# Patient Record
Sex: FEMALE | Race: BLACK OR AFRICAN AMERICAN | Employment: UNEMPLOYED | URBAN - METROPOLITAN AREA
[De-identification: names, ages, dates, MRNs, and addresses within clinical notes are randomized per-mention and may not be internally consistent; named-entity substitution may affect disease eponyms.]

---

## 2020-07-22 ENCOUNTER — APPOINTMENT (OUTPATIENT)
Dept: MRI IMAGING | Age: 60
DRG: 263 | End: 2020-07-22
Attending: FAMILY MEDICINE
Payer: MEDICAID

## 2020-07-22 ENCOUNTER — HOSPITAL ENCOUNTER (INPATIENT)
Age: 60
LOS: 6 days | Discharge: HOME | DRG: 263 | End: 2020-07-28
Attending: FAMILY MEDICINE | Admitting: FAMILY MEDICINE
Payer: MEDICAID

## 2020-07-22 DIAGNOSIS — Z90.49 HX LAPAROSCOPIC CHOLECYSTECTOMY: Primary | ICD-10-CM

## 2020-07-22 DIAGNOSIS — R07.9 CHEST PAIN: ICD-10-CM

## 2020-07-22 PROBLEM — K85.10 GALL STONE PANCREATITIS: Status: ACTIVE | Noted: 2020-07-22

## 2020-07-22 LAB
ERYTHROCYTE [DISTWIDTH] IN BLOOD BY AUTOMATED COUNT: 13.7 % (ref 11.5–14.5)
HCT VFR BLD AUTO: 40.9 % (ref 35–47)
HGB BLD-MCNC: 13.7 G/DL (ref 11.5–16)
MCH RBC QN AUTO: 32.6 PG (ref 26–34)
MCHC RBC AUTO-ENTMCNC: 33.5 G/DL (ref 30–36.5)
MCV RBC AUTO: 97.4 FL (ref 80–99)
NRBC # BLD: 0 K/UL (ref 0–0.01)
NRBC BLD-RTO: 0 PER 100 WBC
PLATELET # BLD AUTO: 223 K/UL (ref 150–400)
PMV BLD AUTO: 10.4 FL (ref 8.9–12.9)
RBC # BLD AUTO: 4.2 M/UL (ref 3.8–5.2)
WBC # BLD AUTO: 5.4 K/UL (ref 3.6–11)

## 2020-07-22 PROCEDURE — 65660000000 HC RM CCU STEPDOWN

## 2020-07-22 PROCEDURE — 81001 URINALYSIS AUTO W/SCOPE: CPT

## 2020-07-22 PROCEDURE — 80053 COMPREHEN METABOLIC PANEL: CPT

## 2020-07-22 PROCEDURE — 84484 ASSAY OF TROPONIN QUANT: CPT

## 2020-07-22 PROCEDURE — 74011250636 HC RX REV CODE- 250/636: Performed by: FAMILY MEDICINE

## 2020-07-22 PROCEDURE — 85027 COMPLETE CBC AUTOMATED: CPT

## 2020-07-22 PROCEDURE — 74183 MRI ABD W/O CNTR FLWD CNTR: CPT

## 2020-07-22 PROCEDURE — 84100 ASSAY OF PHOSPHORUS: CPT

## 2020-07-22 PROCEDURE — 83735 ASSAY OF MAGNESIUM: CPT

## 2020-07-22 PROCEDURE — 36415 COLL VENOUS BLD VENIPUNCTURE: CPT

## 2020-07-22 RX ORDER — SODIUM CHLORIDE, SODIUM LACTATE, POTASSIUM CHLORIDE, CALCIUM CHLORIDE 600; 310; 30; 20 MG/100ML; MG/100ML; MG/100ML; MG/100ML
75 INJECTION, SOLUTION INTRAVENOUS CONTINUOUS
Status: DISCONTINUED | OUTPATIENT
Start: 2020-07-22 | End: 2020-07-28

## 2020-07-22 RX ORDER — SODIUM CHLORIDE 0.9 % (FLUSH) 0.9 %
5-40 SYRINGE (ML) INJECTION EVERY 8 HOURS
Status: DISCONTINUED | OUTPATIENT
Start: 2020-07-22 | End: 2020-07-28 | Stop reason: HOSPADM

## 2020-07-22 RX ORDER — HYDRALAZINE HYDROCHLORIDE 20 MG/ML
10 INJECTION INTRAMUSCULAR; INTRAVENOUS
Status: DISCONTINUED | OUTPATIENT
Start: 2020-07-22 | End: 2020-07-25

## 2020-07-22 RX ORDER — SODIUM CHLORIDE 0.9 % (FLUSH) 0.9 %
5-40 SYRINGE (ML) INJECTION AS NEEDED
Status: DISCONTINUED | OUTPATIENT
Start: 2020-07-22 | End: 2020-07-28 | Stop reason: HOSPADM

## 2020-07-22 RX ORDER — HYDROMORPHONE HYDROCHLORIDE 1 MG/ML
1 INJECTION, SOLUTION INTRAMUSCULAR; INTRAVENOUS; SUBCUTANEOUS
Status: DISCONTINUED | OUTPATIENT
Start: 2020-07-22 | End: 2020-07-25

## 2020-07-22 RX ORDER — HEPARIN SODIUM 5000 [USP'U]/ML
5000 INJECTION, SOLUTION INTRAVENOUS; SUBCUTANEOUS EVERY 8 HOURS
Status: DISCONTINUED | OUTPATIENT
Start: 2020-07-22 | End: 2020-07-23

## 2020-07-22 RX ORDER — ONDANSETRON 2 MG/ML
4 INJECTION INTRAMUSCULAR; INTRAVENOUS
Status: DISCONTINUED | OUTPATIENT
Start: 2020-07-22 | End: 2020-07-27

## 2020-07-22 RX ADMIN — HEPARIN SODIUM 5000 UNITS: 5000 INJECTION, SOLUTION INTRAVENOUS; SUBCUTANEOUS at 23:02

## 2020-07-22 NOTE — Clinical Note
Right groin and right radial clipped prepped with ChloraPrep and draped. Wet prep solution applied at: 1236. Wet prep solution dried at: 1240. Wet prep elapsed drying time: 4 mins.

## 2020-07-22 NOTE — PROGRESS NOTES
Patient has arrived to the floor at this time. Vitals taken. Admitting has been called for paperwork as well as a page to the admitting doc for orders. Bedside shift change report given to [address] [place] [address] Monie (oncoming nurse) by Paolo Franklin RN (offgoing nurse). Report included the following information SBAR, Kardex, Intake/Output, MAR and Recent Results.

## 2020-07-22 NOTE — ROUTINE PROCESS
TRANSFER - IN REPORT:    Verbal report received from Shankar Lloyd RN (name) on Ольга Nagy  being received from [facility] ([facility]) for routine progression of care      Report consisted of patients Situation, Background, Assessment and   Recommendations(SBAR). Information from the following report(s) SBAR, Kardex, Intake/Output, MAR and Recent Results was reviewed with the receiving nurse. Opportunity for questions and clarification was provided. Assessment completed upon patients arrival to unit and care assumed. Patient to be put in for transport from [facility] in [place], [place]. Approximate arrival is [address].

## 2020-07-23 ENCOUNTER — APPOINTMENT (OUTPATIENT)
Dept: NON INVASIVE DIAGNOSTICS | Age: 60
DRG: 263 | End: 2020-07-23
Attending: NURSE PRACTITIONER
Payer: MEDICAID

## 2020-07-23 PROBLEM — R07.9 CHEST PAIN: Status: ACTIVE | Noted: 2020-07-22

## 2020-07-23 LAB
ALBUMIN SERPL-MCNC: 3.6 G/DL (ref 3.5–5)
ALBUMIN SERPL-MCNC: 3.8 G/DL (ref 3.5–5)
ALBUMIN/GLOB SERPL: 0.8 {RATIO} (ref 1.1–2.2)
ALBUMIN/GLOB SERPL: 0.8 {RATIO} (ref 1.1–2.2)
ALP SERPL-CCNC: 640 U/L (ref 45–117)
ALP SERPL-CCNC: 664 U/L (ref 45–117)
ALT SERPL-CCNC: 628 U/L (ref 12–78)
ALT SERPL-CCNC: 655 U/L (ref 12–78)
ANION GAP SERPL CALC-SCNC: 10 MMOL/L (ref 5–15)
ANION GAP SERPL CALC-SCNC: 7 MMOL/L (ref 5–15)
APPEARANCE UR: ABNORMAL
APTT PPP: 25.2 SEC (ref 22.1–32)
AST SERPL-CCNC: 386 U/L (ref 15–37)
AST SERPL-CCNC: 414 U/L (ref 15–37)
ATRIAL RATE: 55 BPM
BACTERIA URNS QL MICRO: ABNORMAL /HPF
BASOPHILS # BLD: 0 K/UL (ref 0–0.1)
BASOPHILS NFR BLD: 0 % (ref 0–1)
BILIRUB SERPL-MCNC: 5.7 MG/DL (ref 0.2–1)
BILIRUB SERPL-MCNC: 5.9 MG/DL (ref 0.2–1)
BILIRUB UR QL CFM: POSITIVE
BUN SERPL-MCNC: 11 MG/DL (ref 6–20)
BUN SERPL-MCNC: 12 MG/DL (ref 6–20)
BUN/CREAT SERPL: 13 (ref 12–20)
BUN/CREAT SERPL: 14 (ref 12–20)
CALCIUM SERPL-MCNC: 9.2 MG/DL (ref 8.5–10.1)
CALCIUM SERPL-MCNC: 9.6 MG/DL (ref 8.5–10.1)
CALCULATED P AXIS, ECG09: 68 DEGREES
CALCULATED R AXIS, ECG10: 44 DEGREES
CALCULATED T AXIS, ECG11: 54 DEGREES
CHLORIDE SERPL-SCNC: 104 MMOL/L (ref 97–108)
CHLORIDE SERPL-SCNC: 104 MMOL/L (ref 97–108)
CO2 SERPL-SCNC: 23 MMOL/L (ref 21–32)
CO2 SERPL-SCNC: 25 MMOL/L (ref 21–32)
COLOR UR: ABNORMAL
CREAT SERPL-MCNC: 0.83 MG/DL (ref 0.55–1.02)
CREAT SERPL-MCNC: 0.84 MG/DL (ref 0.55–1.02)
DIAGNOSIS, 93000: NORMAL
DIFFERENTIAL METHOD BLD: ABNORMAL
ECHO AO ROOT DIAM: 2.46 CM
ECHO AV AREA PEAK VELOCITY: 1.9 CM2
ECHO AV AREA/BSA PEAK VELOCITY: 1 CM2/M2
ECHO AV PEAK GRADIENT: 7.99 MMHG
ECHO AV PEAK VELOCITY: 141.33 CM/S
ECHO LA AREA 4C: 19.04 CM2
ECHO LA MAJOR AXIS: 3.94 CM
ECHO LA MINOR AXIS: 2.16 CM
ECHO LA VOL 2C: 52.73 ML (ref 22–52)
ECHO LA VOL 4C: 49.66 ML (ref 22–52)
ECHO LA VOL BP: 55.58 ML (ref 22–52)
ECHO LA VOL/BSA BIPLANE: 30.47 ML/M2 (ref 16–28)
ECHO LA VOLUME INDEX A2C: 28.9 ML/M2 (ref 16–28)
ECHO LA VOLUME INDEX A4C: 27.22 ML/M2 (ref 16–28)
ECHO LV EDV A2C: 78.64 ML
ECHO LV EDV A4C: 80.33 ML
ECHO LV EDV BP: 79.07 ML (ref 56–104)
ECHO LV EDV INDEX A4C: 44 ML/M2
ECHO LV EDV INDEX BP: 43.3 ML/M2
ECHO LV EDV NDEX A2C: 43.1 ML/M2
ECHO LV EJECTION FRACTION A2C: 49 PERCENT
ECHO LV EJECTION FRACTION A4C: 53 PERCENT
ECHO LV EJECTION FRACTION BIPLANE: 49.8 PERCENT (ref 55–100)
ECHO LV ESV A2C: 39.97 ML
ECHO LV ESV A4C: 37.6 ML
ECHO LV ESV BP: 39.66 ML (ref 19–49)
ECHO LV ESV INDEX A2C: 21.9 ML/M2
ECHO LV ESV INDEX A4C: 20.6 ML/M2
ECHO LV ESV INDEX BP: 21.7 ML/M2
ECHO LV INTERNAL DIMENSION DIASTOLIC: 4.69 CM (ref 3.9–5.3)
ECHO LV INTERNAL DIMENSION SYSTOLIC: 3.81 CM
ECHO LV IVSD: 0.49 CM (ref 0.6–0.9)
ECHO LV MASS 2D: 98.1 G (ref 67–162)
ECHO LV MASS INDEX 2D: 53.8 G/M2 (ref 43–95)
ECHO LV POSTERIOR WALL DIASTOLIC: 0.86 CM (ref 0.6–0.9)
ECHO LVOT DIAM: 1.87 CM
ECHO LVOT PEAK GRADIENT: 3.82 MMHG
ECHO LVOT PEAK VELOCITY: 97.69 CM/S
ECHO MV A VELOCITY: 54.41 CM/S
ECHO MV AREA PHT: 3.88 CM2
ECHO MV E DECELERATION TIME (DT): 0.2 S
ECHO MV E VELOCITY: 101.43 CM/S
ECHO MV E/A RATIO: 1.86
ECHO MV EROA PISA: 0.14 CM2
ECHO MV PRESSURE HALF TIME (PHT): 0.06 S
ECHO MV REGURGITANT RADIUS PISA: 0.63 CM
ECHO MV REGURGITANT VOLUME: 30.35 ML
ECHO MV REGURGITANT VTIA: 215.7 CM
ECHO PV PEAK INSTANTANEOUS GRADIENT SYSTOLIC: 4.07 MMHG
ECHO PV REGURGITANT MAX VELOCITY: 581.08 CM/S
ECHO RV INTERNAL DIMENSION: 3.3 CM
ECHO RV TAPSE: 2.13 CM (ref 1.5–2)
EOSINOPHIL # BLD: 0 K/UL (ref 0–0.4)
EOSINOPHIL NFR BLD: 0 % (ref 0–7)
EPITH CASTS URNS QL MICRO: ABNORMAL /LPF
ERYTHROCYTE [DISTWIDTH] IN BLOOD BY AUTOMATED COUNT: 13.8 % (ref 11.5–14.5)
GLOBULIN SER CALC-MCNC: 4.5 G/DL (ref 2–4)
GLOBULIN SER CALC-MCNC: 4.8 G/DL (ref 2–4)
GLUCOSE SERPL-MCNC: 129 MG/DL (ref 65–100)
GLUCOSE SERPL-MCNC: 136 MG/DL (ref 65–100)
GLUCOSE UR STRIP.AUTO-MCNC: 250 MG/DL
HCT VFR BLD AUTO: 40.8 % (ref 35–47)
HGB BLD-MCNC: 13.6 G/DL (ref 11.5–16)
HGB UR QL STRIP: ABNORMAL
IMM GRANULOCYTES # BLD AUTO: 0 K/UL (ref 0–0.04)
IMM GRANULOCYTES NFR BLD AUTO: 0 % (ref 0–0.5)
KETONES UR QL STRIP.AUTO: ABNORMAL MG/DL
LEUKOCYTE ESTERASE UR QL STRIP.AUTO: ABNORMAL
LYMPHOCYTES # BLD: 1.1 K/UL (ref 0.8–3.5)
LYMPHOCYTES NFR BLD: 17 % (ref 12–49)
MAGNESIUM SERPL-MCNC: 2.3 MG/DL (ref 1.6–2.4)
MCH RBC QN AUTO: 32.8 PG (ref 26–34)
MCHC RBC AUTO-ENTMCNC: 33.3 G/DL (ref 30–36.5)
MCV RBC AUTO: 98.3 FL (ref 80–99)
MONOCYTES # BLD: 0.4 K/UL (ref 0–1)
MONOCYTES NFR BLD: 6 % (ref 5–13)
MUCOUS THREADS URNS QL MICRO: ABNORMAL /LPF
NEUTS SEG # BLD: 4.8 K/UL (ref 1.8–8)
NEUTS SEG NFR BLD: 77 % (ref 32–75)
NITRITE UR QL STRIP.AUTO: POSITIVE
NRBC # BLD: 0 K/UL (ref 0–0.01)
NRBC BLD-RTO: 0 PER 100 WBC
P-R INTERVAL, ECG05: 122 MS
PH UR STRIP: 5 [PH] (ref 5–8)
PHOSPHATE SERPL-MCNC: 3.9 MG/DL (ref 2.6–4.7)
PLATELET # BLD AUTO: 234 K/UL (ref 150–400)
PMV BLD AUTO: 10.5 FL (ref 8.9–12.9)
POTASSIUM SERPL-SCNC: 3.4 MMOL/L (ref 3.5–5.1)
POTASSIUM SERPL-SCNC: 3.6 MMOL/L (ref 3.5–5.1)
PROT SERPL-MCNC: 8.1 G/DL (ref 6.4–8.2)
PROT SERPL-MCNC: 8.6 G/DL (ref 6.4–8.2)
PROT UR STRIP-MCNC: 30 MG/DL
Q-T INTERVAL, ECG07: 498 MS
QRS DURATION, ECG06: 90 MS
QTC CALCULATION (BEZET), ECG08: 476 MS
RBC # BLD AUTO: 4.15 M/UL (ref 3.8–5.2)
RBC #/AREA URNS HPF: ABNORMAL /HPF (ref 0–5)
SODIUM SERPL-SCNC: 136 MMOL/L (ref 136–145)
SODIUM SERPL-SCNC: 137 MMOL/L (ref 136–145)
SP GR UR REFRACTOMETRY: 1.03
THERAPEUTIC RANGE,PTTT: NORMAL SECS (ref 58–77)
TROPONIN I SERPL-MCNC: 2.54 NG/ML
TROPONIN I SERPL-MCNC: 2.86 NG/ML
UR CULT HOLD, URHOLD: NORMAL
UROBILINOGEN UR QL STRIP.AUTO: 1 EU/DL (ref 0.2–1)
VENTRICULAR RATE, ECG03: 55 BPM
WBC # BLD AUTO: 6.3 K/UL (ref 3.6–11)
WBC URNS QL MICRO: ABNORMAL /HPF (ref 0–4)

## 2020-07-23 PROCEDURE — 65660000000 HC RM CCU STEPDOWN

## 2020-07-23 PROCEDURE — 36415 COLL VENOUS BLD VENIPUNCTURE: CPT

## 2020-07-23 PROCEDURE — 85730 THROMBOPLASTIN TIME PARTIAL: CPT

## 2020-07-23 PROCEDURE — A9585 GADOBUTROL INJECTION: HCPCS | Performed by: FAMILY MEDICINE

## 2020-07-23 PROCEDURE — 77030015766: Performed by: INTERNAL MEDICINE

## 2020-07-23 PROCEDURE — 74011636320 HC RX REV CODE- 636/320: Performed by: INTERNAL MEDICINE

## 2020-07-23 PROCEDURE — 74011250636 HC RX REV CODE- 250/636: Performed by: FAMILY MEDICINE

## 2020-07-23 PROCEDURE — 84484 ASSAY OF TROPONIN QUANT: CPT

## 2020-07-23 PROCEDURE — 80053 COMPREHEN METABOLIC PANEL: CPT

## 2020-07-23 PROCEDURE — C1769 GUIDE WIRE: HCPCS | Performed by: INTERNAL MEDICINE

## 2020-07-23 PROCEDURE — B2111ZZ FLUOROSCOPY OF MULTIPLE CORONARY ARTERIES USING LOW OSMOLAR CONTRAST: ICD-10-PCS | Performed by: INTERNAL MEDICINE

## 2020-07-23 PROCEDURE — 77030010221 HC SPLNT WR POS TELE -B: Performed by: INTERNAL MEDICINE

## 2020-07-23 PROCEDURE — 99152 MOD SED SAME PHYS/QHP 5/>YRS: CPT | Performed by: INTERNAL MEDICINE

## 2020-07-23 PROCEDURE — C1894 INTRO/SHEATH, NON-LASER: HCPCS | Performed by: INTERNAL MEDICINE

## 2020-07-23 PROCEDURE — 93306 TTE W/DOPPLER COMPLETE: CPT

## 2020-07-23 PROCEDURE — 4A023N7 MEASUREMENT OF CARDIAC SAMPLING AND PRESSURE, LEFT HEART, PERCUTANEOUS APPROACH: ICD-10-PCS | Performed by: INTERNAL MEDICINE

## 2020-07-23 PROCEDURE — 93005 ELECTROCARDIOGRAM TRACING: CPT

## 2020-07-23 PROCEDURE — 74011000250 HC RX REV CODE- 250: Performed by: INTERNAL MEDICINE

## 2020-07-23 PROCEDURE — 85025 COMPLETE CBC W/AUTO DIFF WBC: CPT

## 2020-07-23 PROCEDURE — 99153 MOD SED SAME PHYS/QHP EA: CPT | Performed by: INTERNAL MEDICINE

## 2020-07-23 PROCEDURE — 77030019569 HC BND COMPR RAD TERU -B: Performed by: INTERNAL MEDICINE

## 2020-07-23 PROCEDURE — 77030013744: Performed by: INTERNAL MEDICINE

## 2020-07-23 PROCEDURE — 77030004532 HC CATH ANGI DX IMP BSC -A: Performed by: INTERNAL MEDICINE

## 2020-07-23 PROCEDURE — 93458 L HRT ARTERY/VENTRICLE ANGIO: CPT | Performed by: INTERNAL MEDICINE

## 2020-07-23 PROCEDURE — 74011250636 HC RX REV CODE- 250/636: Performed by: INTERNAL MEDICINE

## 2020-07-23 PROCEDURE — 74011250636 HC RX REV CODE- 250/636: Performed by: NURSE PRACTITIONER

## 2020-07-23 PROCEDURE — 74011250637 HC RX REV CODE- 250/637: Performed by: PHYSICIAN ASSISTANT

## 2020-07-23 PROCEDURE — 74011250637 HC RX REV CODE- 250/637: Performed by: INTERNAL MEDICINE

## 2020-07-23 RX ORDER — HEPARIN SODIUM 1000 [USP'U]/ML
INJECTION, SOLUTION INTRAVENOUS; SUBCUTANEOUS AS NEEDED
Status: DISCONTINUED | OUTPATIENT
Start: 2020-07-23 | End: 2020-07-23 | Stop reason: HOSPADM

## 2020-07-23 RX ORDER — HEPARIN SODIUM 5000 [USP'U]/ML
4000 INJECTION, SOLUTION INTRAVENOUS; SUBCUTANEOUS ONCE
Status: COMPLETED | OUTPATIENT
Start: 2020-07-23 | End: 2020-07-23

## 2020-07-23 RX ORDER — LISINOPRIL 20 MG/1
20 TABLET ORAL DAILY
Status: DISCONTINUED | OUTPATIENT
Start: 2020-07-23 | End: 2020-07-28 | Stop reason: HOSPADM

## 2020-07-23 RX ORDER — SODIUM CHLORIDE 0.9 % (FLUSH) 0.9 %
5-40 SYRINGE (ML) INJECTION EVERY 8 HOURS
Status: CANCELLED | OUTPATIENT
Start: 2020-07-23

## 2020-07-23 RX ORDER — LIDOCAINE HYDROCHLORIDE 10 MG/ML
INJECTION INFILTRATION; PERINEURAL AS NEEDED
Status: DISCONTINUED | OUTPATIENT
Start: 2020-07-23 | End: 2020-07-23 | Stop reason: HOSPADM

## 2020-07-23 RX ORDER — SODIUM CHLORIDE 0.9 % (FLUSH) 0.9 %
5-40 SYRINGE (ML) INJECTION AS NEEDED
Status: CANCELLED | OUTPATIENT
Start: 2020-07-23

## 2020-07-23 RX ORDER — SODIUM CHLORIDE 0.9 % (FLUSH) 0.9 %
5-40 SYRINGE (ML) INJECTION AS NEEDED
Status: DISCONTINUED | OUTPATIENT
Start: 2020-07-23 | End: 2020-07-28 | Stop reason: HOSPADM

## 2020-07-23 RX ORDER — SODIUM CHLORIDE 0.9 % (FLUSH) 0.9 %
5-40 SYRINGE (ML) INJECTION EVERY 8 HOURS
Status: DISCONTINUED | OUTPATIENT
Start: 2020-07-23 | End: 2020-07-28 | Stop reason: HOSPADM

## 2020-07-23 RX ORDER — DIAZEPAM 5 MG/1
5 TABLET ORAL ONCE
Status: CANCELLED | OUTPATIENT
Start: 2020-07-23 | End: 2020-07-23

## 2020-07-23 RX ORDER — SODIUM CHLORIDE 9 MG/ML
1000 INJECTION, SOLUTION INTRAVENOUS CONTINUOUS
Status: DISCONTINUED | OUTPATIENT
Start: 2020-07-23 | End: 2020-07-25

## 2020-07-23 RX ORDER — HEPARIN SODIUM 200 [USP'U]/100ML
INJECTION, SOLUTION INTRAVENOUS
Status: COMPLETED | OUTPATIENT
Start: 2020-07-23 | End: 2020-07-23

## 2020-07-23 RX ORDER — VERAPAMIL HYDROCHLORIDE 2.5 MG/ML
INJECTION, SOLUTION INTRAVENOUS AS NEEDED
Status: DISCONTINUED | OUTPATIENT
Start: 2020-07-23 | End: 2020-07-23 | Stop reason: HOSPADM

## 2020-07-23 RX ORDER — HEPARIN SODIUM 10000 [USP'U]/100ML
12-25 INJECTION, SOLUTION INTRAVENOUS
Status: DISCONTINUED | OUTPATIENT
Start: 2020-07-23 | End: 2020-07-23

## 2020-07-23 RX ORDER — MIDAZOLAM HYDROCHLORIDE 1 MG/ML
INJECTION, SOLUTION INTRAMUSCULAR; INTRAVENOUS AS NEEDED
Status: DISCONTINUED | OUTPATIENT
Start: 2020-07-23 | End: 2020-07-23 | Stop reason: HOSPADM

## 2020-07-23 RX ORDER — FENTANYL CITRATE 50 UG/ML
INJECTION, SOLUTION INTRAMUSCULAR; INTRAVENOUS AS NEEDED
Status: DISCONTINUED | OUTPATIENT
Start: 2020-07-23 | End: 2020-07-23 | Stop reason: HOSPADM

## 2020-07-23 RX ORDER — GUAIFENESIN 100 MG/5ML
81 LIQUID (ML) ORAL DAILY
Status: CANCELLED | OUTPATIENT
Start: 2020-07-23

## 2020-07-23 RX ORDER — ACETAMINOPHEN 325 MG/1
650 TABLET ORAL
Status: DISCONTINUED | OUTPATIENT
Start: 2020-07-23 | End: 2020-07-28 | Stop reason: HOSPADM

## 2020-07-23 RX ADMIN — ACETAMINOPHEN 650 MG: 325 TABLET ORAL at 18:04

## 2020-07-23 RX ADMIN — Medication 10 ML: at 20:40

## 2020-07-23 RX ADMIN — SODIUM CHLORIDE 1000 ML: 900 INJECTION, SOLUTION INTRAVENOUS at 17:17

## 2020-07-23 RX ADMIN — HEPARIN SODIUM AND DEXTROSE 12 UNITS/KG/HR: 10000; 5 INJECTION INTRAVENOUS at 07:31

## 2020-07-23 RX ADMIN — GADOBUTROL 7 ML: 604.72 INJECTION INTRAVENOUS at 00:18

## 2020-07-23 RX ADMIN — Medication 10 ML: at 17:17

## 2020-07-23 RX ADMIN — HYDROMORPHONE HYDROCHLORIDE 1 MG: 1 INJECTION, SOLUTION INTRAMUSCULAR; INTRAVENOUS; SUBCUTANEOUS at 20:34

## 2020-07-23 RX ADMIN — HEPARIN SODIUM 4000 UNITS: 5000 INJECTION, SOLUTION INTRAVENOUS; SUBCUTANEOUS at 07:30

## 2020-07-23 RX ADMIN — HYDROMORPHONE HYDROCHLORIDE 1 MG: 1 INJECTION, SOLUTION INTRAMUSCULAR; INTRAVENOUS; SUBCUTANEOUS at 08:12

## 2020-07-23 RX ADMIN — LISINOPRIL 20 MG: 20 TABLET ORAL at 17:16

## 2020-07-23 RX ADMIN — Medication 10 ML: at 20:41

## 2020-07-23 NOTE — PROGRESS NOTES
Transfer to [address] [place] [address] from Procedure Area    Verbal report given to Judy Becker on Inez Lyman being transferred to Cardiac Cath Lab  for routine progression of care   Patient is post left heart cath procedure. Patient stable upon transfer to . Report consisted of patients Situation, Background, Assessment and   Recommendations(SBAR). Information from the following report(s) Procedure Summary, MAR and Recent Results was reviewed with the receiving nurse. Opportunity for questions and clarification was provided. Patient medicated during procedure with orders obtained and verified by Dr. Marianna Dobson. Refer to patient PROCEDURE REPORT for vital signs, assessment, status, and response during procedure.

## 2020-07-23 NOTE — PROGRESS NOTES
Bedside shift change report given to Enio Silva RN and Kalli RN (oncoming nurse) by Tomás Teague RN (offgoing nurse). Report included the following information SBAR, Kardex, Intake/Output and MAR.

## 2020-07-23 NOTE — CONSULTS
Surgery Consult    Subjective:      Vikki Bacon is a 64 y.o. female who presented to an outside hospital complaining of abdominal pain. This occurred last night. She states the pain is in the epigastric and right upper quadrant region. She did have some nausea and vomiting. She has never had anything similar to this in the past.  She does have a history of IBS and notes she occasionally gets abdominal pain. This felt different. At the outside hospital she was diagnosed with gallstone pancreatitis and transferred to [facility]. Once here she was noted to have elevated troponins and underwent cardiac cath today. She states she is feeling a little better and believes that may be due to pain medication. Patient Active Problem List    Diagnosis Date Noted    Gall stone pancreatitis 07/22/2020    Chest pain 07/22/2020     No past medical history on file. No past surgical history on file. Social History     Tobacco Use    Smoking status: Not on file   Substance Use Topics    Alcohol use: Not on file      No family history on file. Current Facility-Administered Medications   Medication Dose Route Frequency    lisinopriL (PRINIVIL, ZESTRIL) tablet 20 mg  20 mg Oral DAILY    sodium chloride (NS) flush 5-40 mL  5-40 mL IntraVENous Q8H    sodium chloride (NS) flush 5-40 mL  5-40 mL IntraVENous PRN    lactated Ringers infusion  125 mL/hr IntraVENous CONTINUOUS    HYDROmorphone (PF) (DILAUDID) injection 1 mg  1 mg IntraVENous Q4H PRN    ondansetron (ZOFRAN) injection 4 mg  4 mg IntraVENous Q4H PRN    hydrALAZINE (APRESOLINE) 20 mg/mL injection 10 mg  10 mg IntraVENous Q6H PRN      Allergies   Allergen Reactions    Gluten Unknown (comments)       Review of Systems:    Pertinent items are noted in the History of Present Illness.     Objective:        Visit Vitals  /67   Pulse (!) 56   Temp 98.9 °F (37.2 °C)   Resp 16   Ht 5' 7\" (1.702 m)   Wt 157 lb (71.2 kg)   SpO2 100%   BMI 24.59 kg/m² Physical Exam:  GENERAL: alert, cooperative, no distress, appears stated age, EYE: negative, THROAT & NECK: normal, LUNG: clear to auscultation bilaterally, HEART: regular rate and rhythm, ABDOMEN: soft, with mild tenderness in the epigastric and right upper quadrant EXTREMITIES:  no edema, SKIN: Normal., NEUROLOGIC: negative, PSYCH: non focal    Imaging:  images and reports reviewed  MRCP- Cholelithiasis. 3. There is intra and extrahepatic ductal dilatation. Pancreatic duct is dilated  to 6 mm. There is choledocholithiasis with a 0.7 cm calculus at the ampulla and  a 1.3 cm stone in the distal common bile duct. Lab/Data Review: All lab results for the last 24 hours reviewed. Recent Results (from the past 24 hour(s))   URINALYSIS W/MICROSCOPIC    Collection Time: 07/22/20  9:30 PM   Result Value Ref Range    Color DARK YELLOW      Appearance CLOUDY (A) CLEAR      Specific gravity 1.030      pH (UA) 5.0 5.0 - 8.0      Protein 30 (A) NEG mg/dL    Glucose 250 (A) NEG mg/dL    Ketone TRACE (A) NEG mg/dL    Blood SMALL (A) NEG      Urobilinogen 1.0 0.2 - 1.0 EU/dL    Nitrites Positive (A) NEG      Leukocyte Esterase SMALL (A) NEG      WBC 0-4 0 - 4 /hpf    RBC 0-5 0 - 5 /hpf    Epithelial cells MODERATE (A) FEW /lpf    Bacteria 1+ (A) NEG /hpf    Mucus TRACE (A) NEG /lpf   URINE CULTURE HOLD SAMPLE    Collection Time: 07/22/20  9:30 PM    Specimen: Serum   Result Value Ref Range    Urine culture hold        Urine on hold in Microbiology dept for 2 days. If unpreserved urine is submitted, it cannot be used for addtional testing after 24 hours, recollection will be required.    BILIRUBIN, CONFIRM    Collection Time: 07/22/20  9:30 PM   Result Value Ref Range    Bilirubin UA, confirm Positive     MAGNESIUM    Collection Time: 07/22/20 11:00 PM   Result Value Ref Range    Magnesium 2.3 1.6 - 2.4 mg/dL   PHOSPHORUS    Collection Time: 07/22/20 11:00 PM   Result Value Ref Range    Phosphorus 3.9 2.6 - 4.7 MG/DL TROPONIN I    Collection Time: 07/22/20 11:00 PM   Result Value Ref Range    Troponin-I, Qt. 2.86 (H) <4.00 ng/mL   METABOLIC PANEL, COMPREHENSIVE    Collection Time: 07/22/20 11:00 PM   Result Value Ref Range    Sodium 137 136 - 145 mmol/L    Potassium 3.6 3.5 - 5.1 mmol/L    Chloride 104 97 - 108 mmol/L    CO2 23 21 - 32 mmol/L    Anion gap 10 5 - 15 mmol/L    Glucose 136 (H) 65 - 100 mg/dL    BUN 11 6 - 20 MG/DL    Creatinine 0.83 0.55 - 1.02 MG/DL    BUN/Creatinine ratio 13 12 - 20      GFR est AA >60 >60 ml/min/1.73m2    GFR est non-AA >60 >60 ml/min/1.73m2    Calcium 9.6 8.5 - 10.1 MG/DL    Bilirubin, total 5.7 (H) 0.2 - 1.0 MG/DL    ALT (SGPT) 655 (H) 12 - 78 U/L    AST (SGOT) 414 (H) 15 - 37 U/L    Alk.  phosphatase 664 (H) 45 - 117 U/L    Protein, total 8.6 (H) 6.4 - 8.2 g/dL    Albumin 3.8 3.5 - 5.0 g/dL    Globulin 4.8 (H) 2.0 - 4.0 g/dL    A-G Ratio 0.8 (L) 1.1 - 2.2     CBC W/O DIFF    Collection Time: 07/22/20 11:00 PM   Result Value Ref Range    WBC 5.4 3.6 - 11.0 K/uL    RBC 4.20 3.80 - 5.20 M/uL    HGB 13.7 11.5 - 16.0 g/dL    HCT 40.9 35.0 - 47.0 %    MCV 97.4 80.0 - 99.0 FL    MCH 32.6 26.0 - 34.0 PG    MCHC 33.5 30.0 - 36.5 g/dL    RDW 13.7 11.5 - 14.5 %    PLATELET 241 146 - 796 K/uL    MPV 10.4 8.9 - 12.9 FL    NRBC 0.0 0  WBC    ABSOLUTE NRBC 0.00 0.00 - 0.01 K/uL   EKG, 12 LEAD, INITIAL    Collection Time: 07/23/20  1:21 AM   Result Value Ref Range    Ventricular Rate 55 BPM    Atrial Rate 55 BPM    P-R Interval 122 ms    QRS Duration 90 ms    Q-T Interval 498 ms    QTC Calculation (Bezet) 476 ms    Calculated P Axis 68 degrees    Calculated R Axis 44 degrees    Calculated T Axis 54 degrees    Diagnosis       Sinus bradycardia with sinus arrhythmia  Voltage criteria for left ventricular hypertrophy  ST abnormality, possible digitalis effect  No previous ECGs available  Confirmed by Rachel Kline M.D., Stuart Alexander ([id number]) on 7/23/2020 5:15:26 PM     METABOLIC PANEL, COMPREHENSIVE Collection Time: 07/23/20  5:50 AM   Result Value Ref Range    Sodium 136 136 - 145 mmol/L    Potassium 3.4 (L) 3.5 - 5.1 mmol/L    Chloride 104 97 - 108 mmol/L    CO2 25 21 - 32 mmol/L    Anion gap 7 5 - 15 mmol/L    Glucose 129 (H) 65 - 100 mg/dL    BUN 12 6 - 20 MG/DL    Creatinine 0.84 0.55 - 1.02 MG/DL    BUN/Creatinine ratio 14 12 - 20      GFR est AA >60 >60 ml/min/1.73m2    GFR est non-AA >60 >60 ml/min/1.73m2    Calcium 9.2 8.5 - 10.1 MG/DL    Bilirubin, total 5.9 (H) 0.2 - 1.0 MG/DL    ALT (SGPT) 628 (H) 12 - 78 U/L    AST (SGOT) 386 (H) 15 - 37 U/L    Alk. phosphatase 640 (H) 45 - 117 U/L    Protein, total 8.1 6.4 - 8.2 g/dL    Albumin 3.6 3.5 - 5.0 g/dL    Globulin 4.5 (H) 2.0 - 4.0 g/dL    A-G Ratio 0.8 (L) 1.1 - 2.2     CBC WITH AUTOMATED DIFF    Collection Time: 07/23/20  5:50 AM   Result Value Ref Range    WBC 6.3 3.6 - 11.0 K/uL    RBC 4.15 3.80 - 5.20 M/uL    HGB 13.6 11.5 - 16.0 g/dL    HCT 40.8 35.0 - 47.0 %    MCV 98.3 80.0 - 99.0 FL    MCH 32.8 26.0 - 34.0 PG    MCHC 33.3 30.0 - 36.5 g/dL    RDW 13.8 11.5 - 14.5 %    PLATELET 006 221 - 565 K/uL    MPV 10.5 8.9 - 12.9 FL    NRBC 0.0 0  WBC    ABSOLUTE NRBC 0.00 0.00 - 0.01 K/uL    NEUTROPHILS 77 (H) 32 - 75 %    LYMPHOCYTES 17 12 - 49 %    MONOCYTES 6 5 - 13 %    EOSINOPHILS 0 0 - 7 %    BASOPHILS 0 0 - 1 %    IMMATURE GRANULOCYTES 0 0.0 - 0.5 %    ABS. NEUTROPHILS 4.8 1.8 - 8.0 K/UL    ABS. LYMPHOCYTES 1.1 0.8 - 3.5 K/UL    ABS. MONOCYTES 0.4 0.0 - 1.0 K/UL    ABS. EOSINOPHILS 0.0 0.0 - 0.4 K/UL    ABS. BASOPHILS 0.0 0.0 - 0.1 K/UL    ABS. IMM.  GRANS. 0.0 0.00 - 0.04 K/UL    DF AUTOMATED     TROPONIN I    Collection Time: 07/23/20  5:50 AM   Result Value Ref Range    Troponin-I, Qt. 2.54 (H) <0.05 ng/mL   PTT    Collection Time: 07/23/20  5:50 AM   Result Value Ref Range    aPTT 25.2 22.1 - 32.0 sec    aPTT, therapeutic range     58.0 - 77.0 SECS   ECHO ADULT COMPLETE    Collection Time: 07/23/20  9:59 AM   Result Value Ref Range    MV E/A 1.86     LV Mass AL 98.1 67 - 162 g    LV Mass AL Index 53.8 43 - 95 g/m2    LVES Vol Index BP 21.7 mL/m2    LVED Vol Index BP 43.3 mL/m2    Left Atrium Minor Axis 2.16 cm    LA Vol Index 30.47 16 - 28 ml/m2    LA Vol Index 28.90 16 - 28 ml/m2    LA Vol Index 27.22 16 - 28 ml/m2    LVED Vol Index A4C 44.0 mL/m2    LVED Vol Index A2C 43.1 mL/m2    LVES Vol Index A4C 20.6 mL/m2    LVES Vol Index A2C 21.9 mL/m2    JAMES/BSA Pk Jaden 1.0 cm2/m2    IVSd 0.49 (A) 0.6 - 0.9 cm    LVIDd 4.69 3.9 - 5.3 cm    LVIDs 3.81 cm    LVOT d 1.87 cm    LVPWd 0.86 0.6 - 0.9 cm    BP EF 49.8 (A) 55 - 100 percent    LV Ejection Fraction MOD 2C 49 percent    LV Ejection Fraction MOD 4C 53 percent    LV ED Vol A2C 78.64 mL    LV ED Vol A4C 80.33 mL    LV ED Vol BP 79.07 56 - 104 mL    LV ES Vol A2C 39.97 mL    LV ES Vol A4C 37.60 mL    LV ES Vol BP 39.66 19 - 49 mL    LVOT Peak Gradient 3.82 mmHg    LVOT Peak Velocity 97.69 cm/s    RVIDd 3.30 cm    Left Atrium Major Axis 3.94 cm    LA Volume 55.58 22 - 52 mL    LA Area 4C 19.04 cm2    LA Vol 2C 52.73 (A) 22 - 52 mL    LA Vol 4C 49.66 22 - 52 mL    Aortic Valve Area by Continuity of Peak Velocity 1.90 cm2    AoV PG 7.99 mmHg    Aortic Valve Systolic Peak Velocity 518.79 cm/s    PISA MR Rad 0.63 cm    MV A Jaden 54.41 cm/s    Mitral Valve E Wave Deceleration Time 0.20 s    MV E Jaden 101.43 cm/s    Mitral Valve Pressure Half-time 0.06 s    MVA (PHT) 3.88 cm2    Mitral Effective Regurgitant Orifice Area 0.14 cm2    MV regurgitant volume 30.35 mL    Pulmonic Regurgitant End Max Velocity 581.08 cm/s    Mitral Regurgitant Velocity Time Integral 215.70 cm    Pulmonic Valve Systolic Peak Instantaneous Gradient 4.07 mmHg    Tapse 2.13 (A) 1.5 - 2.0 cm    Ao Root D 2.46 cm       Assessment:Plan   Gallstone pancreatitis with choledocholithiasis  Ultimately patient will benefit from a laparoscopic cholecystectomy. Will need ERCP first in order to clear her duct.  Patient just underwent a cardiac catheterization. She will require cardiac clearance prior to surgery. If she is unable to get cleared cholecystectomy can wait as it is a prophylactic procedure as opposed to a curative procedure.

## 2020-07-23 NOTE — PROGRESS NOTES
TRANSFER - IN REPORT:    Verbal report received from Pepito Villa on Padma Gonzalez  being received from procedural area for routine progression of care. Report consisted of patients Situation, Background, Assessment and Recommendations(SBAR). Information from the following report(s) Procedure Summary, MAR and Recent Results was reviewed with the receiving clinician. Opportunity for questions and clarification was provided. Assessment completed upon patients arrival to [address] [place] [address] and care assumed. Cardiac Cath Lab Recovery Arrival Note:    Padma Gonzalez arrived to [facility] recovery area. Patient procedure= LHC. Patient on cardiac monitor, non-invasive blood pressure, SPO2 monitor. On  or O2 @ 2 lpm via NC.  IV  of NS on pump at 75 ml/hr. Patient status doing well without problems. Patient is A&Ox 3. Patient reports no c/o's. PROCEDURE SITE CHECK:    Procedure site:without any bleeding and hematoma, no pain/discomfort reported at procedure site. No change in patient status. Continue to monitor patient and status.

## 2020-07-23 NOTE — PROGRESS NOTES
Attempted to see patient, patient currently in Cath lab due to chest pain and I am unable to assess. Will sign out to Dr. Jemma Canela.

## 2020-07-23 NOTE — CONSULTS
Cardiology Consult Note      Patient Name: Leopoldo Deans  : 1960 MRN: [id number]  Date: 2020  Time: 10:56 AM    Admit Diagnosis: Gall stone pancreatitis [K85.10]    Primary Cardiologist: none   Consulting Cardiologist: Isai Hu MD    Reason for Consult: Troponin elevation     Requesting MD: Adele Barajas MD    HPI:  Leopoldo Deans is a 64 y.o. female admitted on 2020  for Gall stone pancreatitis [K85.10]. has no past medical history on file. She presented to [facility] for abdominal pain. She awoke with pain in the epigastric region as well as RUQ. CT at [facility] noted Gallstones and pancreatitis. She was transferred for higher level of care. Admission labs to [facility] include standard troponin levels. Trop found to be 2.8. She denies CP or SOB. She states she has never had CP or SOB. She does walk for exercise, and denies any CP or SOB during activity. She has no h/o MI, CAD or CHF. She does not smoke, nor has she ever. Does not use ETOH. She does not know her parents, but states her siblings have had MI, CAD and DM. MRCP at [facility] confirms gallstones with pancreatic duct stones. Subjective:  Received in echo. Feeling some better with less abd pain. Denies CP, SOB, palpitations or leg edema      Assessment and Plan     1. Troponin elevation   - 2.86 --> 2.54   - EKG without ACS   - Denies CP or SOB   - + Fmhx, post menopausal, HTN   - Echo complete. Prelim with normal EF, mild septa WMA and mod MR     *official report to follow  2. Gallstone pancreatitis   - MRCP    1. Study is significantly compromised by motion. 2. Cholelithiasis. 3. There is intra and extrahepatic ductal dilatation. Pancreatic duct is dilated        to 6 mm.  There is choledocholithiasis with a 0.7 cm calculus at the ampulla and       a 1.3 cm stone in the distal common bile duct       - Recommend General Surgery Consult   - NPO   - Elevated LFTs  3. HTN   - - 170s   - Plan for starting agent following cath. NSTEMI with troponin 2.86 and down trending. Unclear etiology for elevated troponin. Would suspect demand from  LFTs elevation and gall stone pancreatitis would cause limited elevation. Noted RF as above. Troponin at this level  Cannot be ignored. Nor would a stress test adequately answer the question of underlying CAD. D/w her cardiac cath. She understands why this is recommended and discussed benefits and risks. Discussed risks and benefits of cardiac cath +/- PCI and patient agrees to proceed  Risk is 1 in 100 for bleeding, prolonged hospital stay, groin complications, infection, tear in cardiac vessel, tamponade or deterioration in kidney function for patients with baseline kidney dysfunction prior to procedure  Risk is 1 in 1,000 of heart attack, stroke or death          Patient Active Problem List   Diagnosis Code    Gall stone pancreatitis K85.10    Chest pain R07.9     No specialty comments available. Review of Systems:    [] Patient unable to provide secondary to condition    [x] All systems negative, except as checked below.   Constitutional:    []Weight Change  []Fever   []Chills   []Night Sweats  []Fatigue  []Malaise  []____  ENT/Mouth:     []Hearing Changes  []Ear Pain  []Nasal Congestion   []Sinus Pain  []Hoarseness   []Sore throat  []Rhinorrhea  []Swallowing Difficulty  []____  Eyes:    []Eye Pain  []Swelling  []Redness  []Foreign Body  []Discharge  []Vision Changes  []____  Cardiovascular:    []Chest Pain  []SOB  []PND  []BRADEN  []Orthopnea  []Claudication  []Edema   []Palpitations  []____  Respiratory:    []Cough  []Sputum  []Wheezing,  []SOB  []Hemoptysis  []____  Gastrointestinal:    []Nausea  []Vomiting  []Diarrhea  []Constipation  [x]Pain  []Heartburn  []Anorexia  []Dysphagia  []Hematochezia  []Melena,  []Jaundice  []____  Genitourinary:    []Dysuria  []Urinary Frequency []Hematuria  []Urinary Incontinence  []Urgency  []Flank Pain  []Hesitancy  []____  Musculoskeletal:    []Arthralgias  []Myalgias  []Joint Swelling  []Joint Stiffness  []Back Pain  []Neck Pain  []____  Skin:    []Skin Lesions  []Pruritis  []Hair Changes  []Skin rashes  []____  Neuro:    []Weakness  []Numbness  []Paresthesias  []Loss of Consciousness  []Syncope   []Dizziness  []Headache  []Coordination Changes  []Recent Falls  []____  Psych:    []Anxiety/Depression  []Insomnia  []Memory Changes  []Violence/Abuse Hx.  []____  Heme/Lymph:    []Bruising  []Bleeding  []Lymphadenopathy  []____  Endocrine:    []Polyuria  []Polydipsia  []Temperature Intolerance  []____         Previous treatment/evaluation includes   none  Cardiac risk factors:   family history, post-menopausal, HTN    No past medical history on file. No past surgical history on file. Current Facility-Administered Medications   Medication Dose Route Frequency    sodium chloride 0.9 % bolus infusion 100 mL  100 mL IntraVENous RAD ONCE    heparin 25,000 units in D5W 250 ml infusion  12-25 Units/kg/hr IntraVENous TITRATE    sodium chloride (NS) flush 5-40 mL  5-40 mL IntraVENous Q8H    sodium chloride (NS) flush 5-40 mL  5-40 mL IntraVENous PRN    lactated Ringers infusion  125 mL/hr IntraVENous CONTINUOUS    HYDROmorphone (PF) (DILAUDID) injection 1 mg  1 mg IntraVENous Q4H PRN    ondansetron (ZOFRAN) injection 4 mg  4 mg IntraVENous Q4H PRN    hydrALAZINE (APRESOLINE) 20 mg/mL injection 10 mg  10 mg IntraVENous Q6H PRN       Allergies   Allergen Reactions    Gluten Unknown (comments)      No family history on file.    Social History     Socioeconomic History    Marital status: SINGLE     Spouse name: Not on file    Number of children: Not on file    Years of education: Not on file    Highest education level: Not on file       Objective:    Physical Exam    Vitals:   Vitals:    07/23/20 0432 07/23/20 0759 07/23/20 0935 07/23/20 1027   BP:  167/77 146/69 155/82   Pulse:  (!) 56  (!) 57   Resp:  18  18   Temp:  98.7 °F (37.1 °C)  97.6 °F (36.4 °C)   SpO2:  96%  99%   Weight: 157 lb 6.4 oz (71.4 kg)  157 lb (71.2 kg)    Height:   5' 7\" (1.702 m)        General:    Alert, cooperative, no distress, appears stated age. Neck:   Supple, no carotid bruit and no JVD. Back:     Symmetric, normal curvature. Lungs:     Clear to auscultation bilaterally. Heart[de-identified]    Regular rate and rhythm, S1, S2 normal, no murmur, click, rub or gallop. Abdomen:     Soft, non-tender. Bowel sounds normal.    Extremities:   Extremities normal, atraumatic, no cyanosis or edema. Vascular:   Pulses - 2+   Skin:   Skin color normal. No rashes or lesions   Neurologic:   CN II-XII grossly intact. Telemetry: normal sinus rhythm    ECG:   EKG Results     Procedure 720 Value Units Date/Time    EKG, 12 LEAD, INITIAL [[id number]] Collected:  07/23/20 0121    Order Status:  Completed Updated:  07/23/20 0826     Ventricular Rate 55 BPM      Atrial Rate 55 BPM      P-R Interval 122 ms      QRS Duration 90 ms      Q-T Interval 498 ms      QTC Calculation (Bezet) 476 ms      Calculated P Axis 68 degrees      Calculated R Axis 44 degrees      Calculated T Axis 54 degrees      Diagnosis --     Sinus bradycardia with sinus arrhythmia  Voltage criteria for left ventricular hypertrophy  ST abnormality, possible digitalis effect  No previous ECGs available              Data Review:     Radiology:   XR Results (most recent):  No results found for this or any previous visit.       Recent Labs     07/23/20  0550 07/22/20  2300   TROIQ 2.54* 2.86*     Recent Labs     07/23/20  0550 07/22/20  2300    137   K 3.4* 3.6    104   CO2 25 23   BUN 12 11   CREA 0.84 0.83   * 136*   PHOS  --  3.9   CA 9.2 9.6     Recent Labs     07/23/20  0550 07/22/20  2300   WBC 6.3 5.4   HGB 13.6 13.7   HCT 40.8 40.9    223     Recent Labs     07/23/20  0550 07/22/20  2300   * 664* No results for input(s): CHOL, LDLC in the last 72 hours. No lab exists for component: TGL, HDLC,  HBA1C  No results for input(s): CRP, TSH, TSHEXT in the last 72 hours. No lab exists for component: ESR    Nichole Gerber.  Evelio Salcedo MD         [facility] [address] [place] [address] [address] [facility] [facility] [address] [place] [address] [id number]

## 2020-07-23 NOTE — CONSULTS
[address] [facility].  [address] [address] [address], [address]  [phone]                     GI CONSULTATION NOTE      NAME:  Paulino Gordon   :   1960   MRN:   [id number]       Referring Physician: Dr. Sheldon Mendes Date: 2020     Chief Complaint: Abdominal pain    History of Present Illness:  Patient is a 64 y.o. who is seen in consultation at the request of Dr. Riky Hathaway for abdominal pain. Patient transferred for for chest pain and right upper quadrant abdominal pain on yesterday evening. Patient came from [facility], was diagnosed with gallstone pancreatitis and elevated troponin level, and was sent to [facility] for further management and evaluation. Patient report start of pain on yesterday morning. She denies any nausea, vomiting, no fever, no chills. No hematemesis, no melena, no bright red stools, no rectal pain. She reports she has had intermittent upper abdominal for a while yet would resolve on its own. She reports history of IBS since her 31's and currently uses Miralax as needed. No previous history of colonoscopy or endoscopy. Denies any colorectal cancer for self for family. Denies smoking, no alcohol consumption and no recreational drug abuse. Past medical history of schizoaffective disorder, hypertension      PMH:  No past medical history on file. PSH:  No past surgical history on file. Allergies:   Allergies   Allergen Reactions    Gluten Unknown (comments)       Home Medications:  None       Hospital Medications:  Current Facility-Administered Medications   Medication Dose Route Frequency    sodium chloride 0.9 % bolus infusion 100 mL  100 mL IntraVENous RAD ONCE    heparin 25,000 units in D5W 250 ml infusion  12-25 Units/kg/hr IntraVENous TITRATE    sodium chloride (NS) flush 5-40 mL  5-40 mL IntraVENous Q8H    sodium chloride (NS) flush 5-40 mL  5-40 mL IntraVENous PRN    lactated Ringers infusion  125 mL/hr IntraVENous CONTINUOUS    HYDROmorphone (PF) (DILAUDID) injection 1 mg  1 mg IntraVENous Q4H PRN    ondansetron (ZOFRAN) injection 4 mg  4 mg IntraVENous Q4H PRN    hydrALAZINE (APRESOLINE) 20 mg/mL injection 10 mg  10 mg IntraVENous Q6H PRN       Social History:  Social History     Tobacco Use    Smoking status: Not on file   Substance Use Topics    Alcohol use: Not on file       Family History:  No family history on file. Review of Systems:    Constitutional: negative fever, negative chills, negative weight loss  Eyes:   negative visual changes  ENT:   negative sore throat, tongue or lip swelling  Respiratory:  negative cough, negative dyspnea  Cards:  negative for chest pain, palpitations, lower extremity edema  GI:   See HPI  :  negative for frequency, dysuria  Integument:  negative for rash and pruritus  Heme:  negative for easy bruising and gum/nose bleeding  Musculoskel: negative for myalgias,  back pain and muscle weakness  Neuro: negative for headaches, dizziness, vertigo  Psych:  negative for feelings of anxiety, depression      Objective:     Patient Vitals for the past 8 hrs:   BP Temp Pulse Resp SpO2 Height Weight   07/23/20 1027 155/82 97.6 °F (36.4 °C) (!) 57 18 99 % -- --   07/23/20 0935 146/69 -- -- -- -- 5' 7\" (1.702 m) 71.2 kg (157 lb)   07/23/20 0759 167/77 98.7 °F (37.1 °C) (!) 56 18 96 % -- --   07/23/20 0432 -- -- -- -- -- -- 71.4 kg (157 lb 6.4 oz)     No intake/output data recorded. No intake/output data recorded. PHYSICAL EXAM:  General: WD, WN. Alert, cooperative, no acute distress    HEENT: NC, Atraumatic. PERRLA, EOMI. Anicteric sclerae. Lungs:  CTA Bilaterally. No Wheezing/Rhonchi/Rales. Heart:  Regular  rhythm,  No murmur (), No Rubs, No Gallops  Abdomen: Soft, Non distended, Non tender.  +Bowel sounds, no HSM  Extremities: No c/c/e  Neurologic:  CN 2-12 gi, Alert and oriented X 3.   No acute neurological distress   Psych:   Good insight. Not anxious nor agitated. Data Review     Recent Labs     07/23/20  0550 07/22/20  2300   WBC 6.3 5.4   HGB 13.6 13.7   HCT 40.8 40.9    223     Recent Labs     07/23/20  0550 07/22/20  2300    137   K 3.4* 3.6    104   CO2 25 23   BUN 12 11   CREA 0.84 0.83   * 136*   PHOS  --  3.9   CA 9.2 9.6     Recent Labs     07/23/20  0550 07/22/20  2300   * 664*   TP 8.1 8.6*   ALB 3.6 3.8   GLOB 4.5* 4.8*     Recent Labs     07/23/20  0550   APTT 25.2             Assessment:   Patient Active Problem List   Diagnosis Code    Gall stone pancreatitis K85.10    Chest pain R07.9                      Plan:   Gallstone pancreatitis:  -MRCP 7/23/2020:  Study is significantly compromised by motion. Cholelithiasis. There is intra and extrahepatic ductal dilatation. Pancreatic duct is dilated to 6 mm. There is choledocholithiasis with a 0.7 cm calculus at the ampulla and a 1.3 cm stone in the distal common bile duct.   -Elevated LFT's   Ref. Range 7/23/2020 05:50   ALT Latest Ref Range: 12 - 78 U/L 628 (H)   AST Latest Ref Range: 15 - 37 U/L 386 (H)   Alk. phosphatase Latest Ref Range: 45 - 117 U/L 640 (H)   -IV hydration  -Pain management  -Anti-emetics   -Continue to monitor LFTs  -Plans for ERCP on tomorrow 7/24/2020, NPO after midnight  -Consult General Surgery for possible cholecystectomy     Elevated Troponin    -Level elevated at 2.86 on admission  -Cardiology following  -ECHO and Cardiac cath scheduled for today    Discussed with Dr. Bienvenido Gomez        I have examined the patient. I have reviewed the chart and agree with the documentation recorded by the NP, including the assessment, treatment plan, and disposition. ASSESSMENT AND PLAN:  64 yr old lady has presented with chest pain, RUQ pain, gall stone pancreatitis and elevated troponin levels. MRCP revealed CBD stone ,ampullary stone and dilated CBD and pancreatic ducts. Cardiac work up as outlined.   If cardiac work is non revealing, we will proceed with ERCP, sphincterotomy and stone extraction tomorrow  if cleared by cardiology. General surgery consult for Lap Demetra. Thank you for consultation. We will follow with you.     Rosa Isela Fam MD

## 2020-07-23 NOTE — PROGRESS NOTES
7/23/2020; 14:15 -   CM attempted to see patient, but patient is currently off the unit for testing. CM to attempt seeing patient at a later time.   CRM: Robbi Perdomo, MPH, [address] [place] [address]; Z: [phone]

## 2020-07-23 NOTE — PROGRESS NOTES
Problem: Falls - Risk of  Goal: *Absence of Falls  Description: Document Davion Apple Fall Risk and appropriate interventions in the flowsheet.   Outcome: Progressing Towards Goal  Note: Fall Risk Interventions:            Medication Interventions: Evaluate medications/consider consulting pharmacy, Teach patient to arise slowly                   Problem: Patient Education: Go to Patient Education Activity  Goal: Patient/Family Education  Outcome: Progressing Towards Goal     Problem: Patient Education: Go to Patient Education Activity  Goal: Patient/Family Education  Outcome: Progressing Towards Goal     Problem: Cath Lab Procedures: Post-Cath Day of Procedure (Initiate SCIP Measures for Post-Op Care)  Goal: Activity/Safety  Outcome: Progressing Towards Goal  Goal: Consults, if ordered  Outcome: Progressing Towards Goal  Goal: Diagnostic Test/Procedures  Outcome: Progressing Towards Goal  Goal: Nutrition/Diet  Outcome: Progressing Towards Goal  Goal: Discharge Planning  Outcome: Progressing Towards Goal  Goal: Medications  Outcome: Progressing Towards Goal  Goal: Respiratory  Outcome: Progressing Towards Goal  Goal: Treatments/Interventions/Procedures  Outcome: Progressing Towards Goal  Goal: Psychosocial  Outcome: Progressing Towards Goal  Goal: *Procedure site is without bleeding and signs of infection six hours post sheath removal  Outcome: Progressing Towards Goal  Goal: *Hemodynamically stable  Outcome: Progressing Towards Goal  Goal: *Optimal pain control at patient's stated goal  Outcome: Progressing Towards Goal

## 2020-07-23 NOTE — H&P
[address] [facility]  HISTORY AND PHYSICAL    Name:  Mau Haq  MR#:  [id number]  :  1960  ACCOUNT #:  [de-identified]  ADMIT DATE:  2020    CHIEF COMPLAINT:  Abdominal pain. HISTORY OF PRESENT ILLNESS:  The patient is a 61-year-old female with a past medical history of schizoaffective disorder, who presents to the hospital with the above-mentioned symptom. The patient reports that this morning, she woke up with significant pain in her epigastric region and right upper quadrant, right lower quadrant. The patient reports that she also had some nausea and vomiting associated with the same. The patient reports that she continued to have the pain and discomfort, got concerned and decided to come to the hospital.  The patient came to [facility], was diagnosed with gallstone pancreatitis and was sent to [facility] for further management and evaluation. The patient reports that she has never had pancreatitis, does not drink alcohol or denies any other complaints or problems including gallbladder disease. The patient came to the hospital and was requested to be admitted to the hospitalist service. The patient denies any other complaints or problems. Denies any headache, blurry vision, sore throat, trouble swallowing, trouble with speech, chest pain, shortness of breath, cough, fever, chills, urinary symptoms, focal or generalized neurological weakness, recent travel, sick contacts, falls, injuries, hematemesis, melena, hemoptysis, hematuria, or any other concerns or problems. PAST MEDICAL HISTORY:  See above. HOME MEDICATIONS:  The patient does not remember her home medications currently and is going to call her home to get her home medications. ALLERGIES:  NO KNOWN DRUG ALLERGIES. SOCIAL HISTORY:  Denies tobacco abuse, alcohol use, or IV drug abuse. Lives at home.     FAMILY HISTORY:  Discussed, no history of gallbladder disease in the family. REVIEW OF SYSTEMS:  All systems were reviewed and found to be essentially negative except for the symptoms mentioned above. PHYSICAL EXAMINATION:  VITAL SIGNS:  Temperature 98.6, pulse 65, respiratory rate 10, blood pressure 174/76, pulse oximetry 98% on room air. GENERAL:  Alert x3, awake, mildly distressed, pleasant female, appears to be stated age. HEENT:  Pupils are equal and reactive to light. Dry mucous membranes. Tympanic membranes are clear. NECK:  Supple. CHEST:  Decreased basilar breath sounds. CORONARY:  S1 and S2 are heard. ABDOMEN:  Soft, tender to palpation in the right upper quadrant. No rebound. No guarding. Bowel sounds were . EXTREMITIES:  No clubbing, no cyanosis, no edema. NEURO/PSYCH:  Pleasant mood and affect. Cranial nerves II through XII are grossly intact. No focal neurological deficits. SKIN:  Warm. LABORATORY DATA:  White count 4.5, hemoglobin 13.3, hematocrit 38.6, platelets 377. Glucose 182, BUN 11, creatinine 0.9, sodium 136, potassium 3.6, chloride 101, bicarbonate 27, calcium 9.7. , , alk phos 639, GFR 78, albumin 4.0, lipase 10,982, total bilirubin 4.9. Urine shows 0-5 wbc's, 5-10 rbc's. CT of the abdomen and pelvis shows gallstones, common bile duct is 10 mm in diameter. ASSESSMENT AND PLAN:  1. Gallstone pancreatitis. The patient will be admitted on a telemetry bed. Provide aggressive IV hydration, pain control, MRCP, gastroenterology consult, possible ERCP in the morning. Trend LFTs, keep n.p.o., and continue to closely monitor. Further intervention per hospital course. Reassess as needed. 2.  Accelerated hypertension, likely secondary to gallstone pancreatitis, driven by pain. We will provide pain control, hydralazine p.r.n. and further intervention per hospital course. Continue to monitor. Reassess as needed. 3.  Schizoaffective disorder.   Once home medications are available, we will start home medications and we will continue to closely monitor. 4.  Gastrointestinal and deep venous thrombosis prophylaxis. The patient will be on heparin.         Joe Avitia MD [id number]  D:  07/22/2020 21:24  T:  07/23/2020 1:21  JOB #:  [id number]

## 2020-07-23 NOTE — PROCEDURES
Cardiac Catheterization Preliminary Note      Patient: Yoli Hitchcock  MRN: [id number]  SSN: [id number]   YOB: 1960 Age: 64 y.o. Sex: female        Date of Procedure: 7/23/2020    Indications: This is a 64 y.o. yrs female who presents with chest and abdominal pain, nausea and vomiting, and elevated troponin. She is found to have gallstone pancreatitis that will require further intervention. Procedure:  Informed consent was obtained. Time out was performed. The patient was brought to the cardiac catheterization lab and the right forearm prepped and draped in the usual sterile fashion. The right wrist was infiltrated with lidocaine, and the radial artery accessed via the modified Seldinger technique. A slender sheath was placed and the radial cocktail administered. A 5 Fr Jaime catheter was then advanced over a wire into the left ventricle. Hemodynamic measurements were obtained. A left ventriculogram was not performed to conserve contrast. The catheter was pulled back and used to engage the right coronary artery. Selective angiography was performed in various projections. A 5 Western Deanna JL 3.5 diagnostic catheter was used to engage the left coronary artery. Selective angiography was performed in various projections. At the termination of the case, the catheter and sheath were removed and a TR Band applied until hemostasis was achieved. The patient tolerated the procedure well and was transferred to recovery in stable condition. Contrast: 43 cc Visipaque. Blood Loss: Minimal.  Radiation: 488 cGy. 5.1 mins. Complications: None    Findings:   Left Main: Large-caliber vessel angiographically normal.    Left Anterior Descending coronary artery: Large caliber vessel provides a large bifurcating diagonal branch proximal segment. LAD has luminal irregularities. There is corkscrewing of the distal vessel.     Left Circumflex coronary artery: Medium caliber vessel provides 2 small marginal branches. The left circumflex system has luminal irregularities. Right Coronary artery: Dominant. Medium caliber vessel provides a small posterior descending artery and sternal segment. The right coronary has luminal irregularities. Corkscrewing of the distal vasculature is noted. Left Ventricle: No aortic valve gradient. Impression: Elevated troponin with chest abdominal discomfort with trivial coronary artery disease and preserved left ventricular function. This may represent isolated elevated troponin which is a normal level for the patient. Alternatively, type II myocardial infarction due to acute stress. Recommendations: Medical therapy with risk factor modification.         Diego Sotelo MD  7/23/2020, 1:05 PM    [facility] Crossing Office:   Jorge Comment Office:  [address] [place] [address] Dr    [place] [address] [facility]  [address]     15 [facility], [address] [place] [address]  P: [phone]    P: [phone]  F: [phone]    F: [phone]

## 2020-07-23 NOTE — PROGRESS NOTES
Verbal report given to Paul(name) on NAME  being transferred to [facility](unit) for routine progression of care       Report consisted of patients Situation, Background, Assessment and   Recommendations(SBAR). Information from the following report(s) Procedure Summary, MAR and Recent Results was reviewed with the receiving nurse. Lines:       Opportunity for questions and clarification was provided.       Patient transported with:   Monitor  Registered Nurse

## 2020-07-23 NOTE — PROGRESS NOTES
Madhavi NP Progress note    Name: Dennie Patricia  YOB: 1960  MRN: [id number]  Admission Date: 7/22/2020  7:30 PM    Date of service: 7/23/2020 2:13 AM                                Overnight Update:        Complaint: Elevated troponin  Paged by: Anselmo GUPTA  Subjective: Elevated troponin 2.86. Patient denies chest pain/shortness of breath, only c/o RUQ abdominal pain. No prior cardiac history aside from HTN.  Has not seen a cardiologist in the past.  EKG: Sinus twyla rate 55, unchanged from prior  Plan:   Elevated troponin  - 2.86, denies chest pain, shortness of breath, fatigue, diaphoresis, nausea  - EKG unchanged from that obtained at transferring hospital  - Will request cardiology consultation (none prior) and place order for echo  - Trend troponins q6h  - Initiate heparin gtt (pending patient weight)  - If patient becomes symptomatic will stat page cardiology on call for further recommendations     Jose D LI-C, PA-C  [phone] or [place]

## 2020-07-23 NOTE — PROGRESS NOTES
1520: TRANSFER - IN REPORT:    Verbal report received from Maurilio Duarte RN(name) on [place]  being received from CCL(unit) for routine progression of care      Report consisted of patients Situation, Background, Assessment and   Recommendations(SBAR). Information from the following report(s) SBAR, Kardex, Intake/Output, MAR, Accordion, Recent Results and Cardiac Rhythm sinus twyla was reviewed with the receiving nurse. Opportunity for questions and clarification was provided. Assessment completed upon patients arrival to unit and care assumed. 1640: 2 cc air removed from TR band, 7 cc remaining. Site clean dry and intact    1720: 2 cc air removed from TR band, 5 cc remaining. Site clean dry and intact    1815: 2 cc air removed from TR band, 3 cc remaining. Site clean dry and intact    1900: 3 cc air removed from TR band, none remaining. Site clean dry and intact    1930: Bedside and Verbal shift change report given to Gil Chance (oncoming nurse) by Sushant He RN (offgoing nurse). Report included the following information SBAR, Kardex, Intake/Output, MAR, Accordion, Recent Results and Cardiac Rhythm NSR.

## 2020-07-24 ENCOUNTER — APPOINTMENT (OUTPATIENT)
Dept: GENERAL RADIOLOGY | Age: 60
DRG: 263 | End: 2020-07-24
Attending: SPECIALIST
Payer: MEDICAID

## 2020-07-24 ENCOUNTER — ANESTHESIA EVENT (OUTPATIENT)
Dept: ENDOSCOPY | Age: 60
DRG: 263 | End: 2020-07-24
Payer: MEDICAID

## 2020-07-24 ENCOUNTER — ANESTHESIA (OUTPATIENT)
Dept: ENDOSCOPY | Age: 60
DRG: 263 | End: 2020-07-24
Payer: MEDICAID

## 2020-07-24 PROBLEM — R77.8 ELEVATED TROPONIN: Status: ACTIVE | Noted: 2020-07-24

## 2020-07-24 LAB
ALBUMIN SERPL-MCNC: 3.1 G/DL (ref 3.5–5)
ALBUMIN/GLOB SERPL: 0.7 {RATIO} (ref 1.1–2.2)
ALP SERPL-CCNC: 613 U/L (ref 45–117)
ALT SERPL-CCNC: 598 U/L (ref 12–78)
ANION GAP SERPL CALC-SCNC: 8 MMOL/L (ref 5–15)
AST SERPL-CCNC: 355 U/L (ref 15–37)
BILIRUB SERPL-MCNC: 7.4 MG/DL (ref 0.2–1)
BUN SERPL-MCNC: 12 MG/DL (ref 6–20)
BUN/CREAT SERPL: 18 (ref 12–20)
CALCIUM SERPL-MCNC: 8.6 MG/DL (ref 8.5–10.1)
CHLORIDE SERPL-SCNC: 106 MMOL/L (ref 97–108)
CO2 SERPL-SCNC: 21 MMOL/L (ref 21–32)
CREAT SERPL-MCNC: 0.66 MG/DL (ref 0.55–1.02)
ERYTHROCYTE [DISTWIDTH] IN BLOOD BY AUTOMATED COUNT: 13.9 % (ref 11.5–14.5)
GLOBULIN SER CALC-MCNC: 4.2 G/DL (ref 2–4)
GLUCOSE SERPL-MCNC: 110 MG/DL (ref 65–100)
HCT VFR BLD AUTO: 36 % (ref 35–47)
HGB BLD-MCNC: 11.8 G/DL (ref 11.5–16)
LIPASE SERPL-CCNC: >3000 U/L (ref 73–393)
MCH RBC QN AUTO: 32.7 PG (ref 26–34)
MCHC RBC AUTO-ENTMCNC: 32.8 G/DL (ref 30–36.5)
MCV RBC AUTO: 99.7 FL (ref 80–99)
NRBC # BLD: 0 K/UL (ref 0–0.01)
NRBC BLD-RTO: 0 PER 100 WBC
PLATELET # BLD AUTO: 221 K/UL (ref 150–400)
PMV BLD AUTO: 10.6 FL (ref 8.9–12.9)
POTASSIUM SERPL-SCNC: 3.7 MMOL/L (ref 3.5–5.1)
PROT SERPL-MCNC: 7.3 G/DL (ref 6.4–8.2)
RBC # BLD AUTO: 3.61 M/UL (ref 3.8–5.2)
SODIUM SERPL-SCNC: 135 MMOL/L (ref 136–145)
WBC # BLD AUTO: 6.6 K/UL (ref 3.6–11)

## 2020-07-24 PROCEDURE — 0FC98ZZ EXTIRPATION OF MATTER FROM COMMON BILE DUCT, VIA NATURAL OR ARTIFICIAL OPENING ENDOSCOPIC: ICD-10-PCS | Performed by: SPECIALIST

## 2020-07-24 PROCEDURE — 77030007288 HC DEV LOK BILI BSC -A: Performed by: SPECIALIST

## 2020-07-24 PROCEDURE — 36415 COLL VENOUS BLD VENIPUNCTURE: CPT

## 2020-07-24 PROCEDURE — 77030026438 HC STYL ET INTUB CARD -A: Performed by: ANESTHESIOLOGY

## 2020-07-24 PROCEDURE — 77030036933 HC BRSH RX CYTO WREGD BSC -C: Performed by: SPECIALIST

## 2020-07-24 PROCEDURE — BF101ZZ FLUOROSCOPY OF BILE DUCTS USING LOW OSMOLAR CONTRAST: ICD-10-PCS | Performed by: SPECIALIST

## 2020-07-24 PROCEDURE — 0F798DZ DILATION OF COMMON BILE DUCT WITH INTRALUMINAL DEVICE, VIA NATURAL OR ARTIFICIAL OPENING ENDOSCOPIC: ICD-10-PCS | Performed by: SPECIALIST

## 2020-07-24 PROCEDURE — 76060000032 HC ANESTHESIA 0.5 TO 1 HR: Performed by: SPECIALIST

## 2020-07-24 PROCEDURE — 77030009038 HC CATH BILI STN RTVR BSC -C: Performed by: SPECIALIST

## 2020-07-24 PROCEDURE — 77030008684 HC TU ET CUF COVD -B: Performed by: ANESTHESIOLOGY

## 2020-07-24 PROCEDURE — 76040000007: Performed by: SPECIALIST

## 2020-07-24 PROCEDURE — 74011000250 HC RX REV CODE- 250: Performed by: NURSE ANESTHETIST, CERTIFIED REGISTERED

## 2020-07-24 PROCEDURE — 65660000000 HC RM CCU STEPDOWN

## 2020-07-24 PROCEDURE — 74011250636 HC RX REV CODE- 250/636: Performed by: NURSE ANESTHETIST, CERTIFIED REGISTERED

## 2020-07-24 PROCEDURE — 74011250636 HC RX REV CODE- 250/636: Performed by: FAMILY MEDICINE

## 2020-07-24 PROCEDURE — 94760 N-INVAS EAR/PLS OXIMETRY 1: CPT

## 2020-07-24 PROCEDURE — 77030012596 HC SPHNTOM BILI BSC -E: Performed by: SPECIALIST

## 2020-07-24 PROCEDURE — 77030040361 HC SLV COMPR DVT MDII -B: Performed by: SPECIALIST

## 2020-07-24 PROCEDURE — 80053 COMPREHEN METABOLIC PANEL: CPT

## 2020-07-24 PROCEDURE — 88112 CYTOPATH CELL ENHANCE TECH: CPT

## 2020-07-24 PROCEDURE — 85027 COMPLETE CBC AUTOMATED: CPT

## 2020-07-24 PROCEDURE — C1874 STENT, COATED/COV W/DEL SYS: HCPCS | Performed by: SPECIALIST

## 2020-07-24 PROCEDURE — 83690 ASSAY OF LIPASE: CPT

## 2020-07-24 DEVICE — STENT SYSTEM RMV
Type: IMPLANTABLE DEVICE | Site: BILE DUCT | Status: FUNCTIONAL
Brand: WALLFLEX BILIARY

## 2020-07-24 RX ORDER — GLYCOPYRROLATE 0.2 MG/ML
INJECTION INTRAMUSCULAR; INTRAVENOUS AS NEEDED
Status: DISCONTINUED | OUTPATIENT
Start: 2020-07-24 | End: 2020-07-24 | Stop reason: HOSPADM

## 2020-07-24 RX ORDER — QUETIAPINE FUMARATE 100 MG/1
100 TABLET, FILM COATED ORAL
COMMUNITY

## 2020-07-24 RX ORDER — SODIUM CHLORIDE 0.9 % (FLUSH) 0.9 %
5-40 SYRINGE (ML) INJECTION EVERY 8 HOURS
Status: CANCELLED | OUTPATIENT
Start: 2020-07-24

## 2020-07-24 RX ORDER — DEXAMETHASONE SODIUM PHOSPHATE 4 MG/ML
INJECTION, SOLUTION INTRA-ARTICULAR; INTRALESIONAL; INTRAMUSCULAR; INTRAVENOUS; SOFT TISSUE AS NEEDED
Status: DISCONTINUED | OUTPATIENT
Start: 2020-07-24 | End: 2020-07-24 | Stop reason: HOSPADM

## 2020-07-24 RX ORDER — ATROPINE SULFATE 0.1 MG/ML
0.5 INJECTION INTRAVENOUS
Status: CANCELLED | OUTPATIENT
Start: 2020-07-24 | End: 2020-07-25

## 2020-07-24 RX ORDER — ONDANSETRON 2 MG/ML
INJECTION INTRAMUSCULAR; INTRAVENOUS AS NEEDED
Status: DISCONTINUED | OUTPATIENT
Start: 2020-07-24 | End: 2020-07-24 | Stop reason: HOSPADM

## 2020-07-24 RX ORDER — PROPOFOL 10 MG/ML
INJECTION, EMULSION INTRAVENOUS AS NEEDED
Status: DISCONTINUED | OUTPATIENT
Start: 2020-07-24 | End: 2020-07-24 | Stop reason: HOSPADM

## 2020-07-24 RX ORDER — LIDOCAINE HYDROCHLORIDE 20 MG/ML
INJECTION, SOLUTION EPIDURAL; INFILTRATION; INTRACAUDAL; PERINEURAL AS NEEDED
Status: DISCONTINUED | OUTPATIENT
Start: 2020-07-24 | End: 2020-07-24 | Stop reason: HOSPADM

## 2020-07-24 RX ORDER — NALOXONE HYDROCHLORIDE 0.4 MG/ML
0.4 INJECTION, SOLUTION INTRAMUSCULAR; INTRAVENOUS; SUBCUTANEOUS
Status: CANCELLED | OUTPATIENT
Start: 2020-07-24 | End: 2020-07-24

## 2020-07-24 RX ORDER — FLUMAZENIL 0.1 MG/ML
0.2 INJECTION INTRAVENOUS
Status: CANCELLED | OUTPATIENT
Start: 2020-07-24 | End: 2020-07-24

## 2020-07-24 RX ORDER — EPINEPHRINE 0.1 MG/ML
1 INJECTION INTRACARDIAC; INTRAVENOUS
Status: CANCELLED | OUTPATIENT
Start: 2020-07-24 | End: 2020-07-25

## 2020-07-24 RX ORDER — ROCURONIUM BROMIDE 10 MG/ML
INJECTION, SOLUTION INTRAVENOUS AS NEEDED
Status: DISCONTINUED | OUTPATIENT
Start: 2020-07-24 | End: 2020-07-24 | Stop reason: HOSPADM

## 2020-07-24 RX ORDER — DEXTROMETHORPHAN/PSEUDOEPHED 2.5-7.5/.8
1.2 DROPS ORAL
Status: CANCELLED | OUTPATIENT
Start: 2020-07-24

## 2020-07-24 RX ORDER — SUCCINYLCHOLINE CHLORIDE 20 MG/ML
INJECTION INTRAMUSCULAR; INTRAVENOUS AS NEEDED
Status: DISCONTINUED | OUTPATIENT
Start: 2020-07-24 | End: 2020-07-24 | Stop reason: HOSPADM

## 2020-07-24 RX ORDER — SODIUM CHLORIDE 0.9 % (FLUSH) 0.9 %
5-40 SYRINGE (ML) INJECTION AS NEEDED
Status: CANCELLED | OUTPATIENT
Start: 2020-07-24

## 2020-07-24 RX ADMIN — ONDANSETRON HYDROCHLORIDE 4 MG: 2 INJECTION, SOLUTION INTRAMUSCULAR; INTRAVENOUS at 13:04

## 2020-07-24 RX ADMIN — SODIUM CHLORIDE, SODIUM LACTATE, POTASSIUM CHLORIDE, AND CALCIUM CHLORIDE 125 ML/HR: 600; 310; 30; 20 INJECTION, SOLUTION INTRAVENOUS at 11:50

## 2020-07-24 RX ADMIN — PROPOFOL 50 MG: 10 INJECTION, EMULSION INTRAVENOUS at 13:11

## 2020-07-24 RX ADMIN — HYDROMORPHONE HYDROCHLORIDE 1 MG: 1 INJECTION, SOLUTION INTRAMUSCULAR; INTRAVENOUS; SUBCUTANEOUS at 08:18

## 2020-07-24 RX ADMIN — ROCURONIUM BROMIDE 5 MG: 10 SOLUTION INTRAVENOUS at 13:03

## 2020-07-24 RX ADMIN — SUCCINYLCHOLINE CHLORIDE 180 MG: 20 INJECTION, SOLUTION INTRAMUSCULAR; INTRAVENOUS at 13:04

## 2020-07-24 RX ADMIN — PROPOFOL 150 MG: 10 INJECTION, EMULSION INTRAVENOUS at 13:03

## 2020-07-24 RX ADMIN — LIDOCAINE HYDROCHLORIDE 60 MG: 20 INJECTION, SOLUTION EPIDURAL; INFILTRATION; INTRACAUDAL; PERINEURAL at 13:03

## 2020-07-24 RX ADMIN — DEXAMETHASONE SODIUM PHOSPHATE 4 MG: 4 INJECTION, SOLUTION INTRAMUSCULAR; INTRAVENOUS at 13:04

## 2020-07-24 RX ADMIN — SODIUM CHLORIDE, SODIUM LACTATE, POTASSIUM CHLORIDE, AND CALCIUM CHLORIDE 125 ML/HR: 600; 310; 30; 20 INJECTION, SOLUTION INTRAVENOUS at 03:56

## 2020-07-24 RX ADMIN — HYDROMORPHONE HYDROCHLORIDE 1 MG: 1 INJECTION, SOLUTION INTRAMUSCULAR; INTRAVENOUS; SUBCUTANEOUS at 03:56

## 2020-07-24 RX ADMIN — GLYCOPYRROLATE 0.2 MG: 0.2 INJECTION, SOLUTION INTRAMUSCULAR; INTRAVENOUS at 13:04

## 2020-07-24 NOTE — PROGRESS NOTES
1930  Bedside shift change report given to Panfilo Latham by Osman GUPTA. Report included the following information SBAR, Kardex, Intake/Output, MAR, Accordion, Recent Results and Cardiac Rhythm NSR.

## 2020-07-24 NOTE — ANESTHESIA PREPROCEDURE EVALUATION
Relevant Problems   No relevant active problems       Anesthetic History   No history of anesthetic complications            Review of Systems / Medical History  Patient summary reviewed, nursing notes reviewed and pertinent labs reviewed    Pulmonary  Within defined limits                 Neuro/Psych   Within defined limits           Cardiovascular  Within defined limits                     GI/Hepatic/Renal  Within defined limits              Endo/Other  Within defined limits           Other Findings              Physical Exam    Airway  Mallampati: I  TM Distance: > 6 cm  Neck ROM: normal range of motion   Mouth opening: Normal     Cardiovascular  Regular rate and rhythm,  S1 and S2 normal,  no murmur, click, rub, or gallop             Dental  No notable dental hx       Pulmonary  Breath sounds clear to auscultation               Abdominal  GI exam deferred       Other Findings            Anesthetic Plan    ASA: 2  Anesthesia type: MAC          Induction: Intravenous  Anesthetic plan and risks discussed with: Patient

## 2020-07-24 NOTE — ANESTHESIA POSTPROCEDURE EVALUATION
Post-Anesthesia Evaluation and Assessment    Patient: Марина Varela MRN: [id number]  SSN: [id number]    YOB: 1960  Age: 64 y.o. Sex: female      I have evaluated the patient and they are stable and ready for discharge from the PACU. Cardiovascular Function/Vital Signs  Visit Vitals  /83   Pulse 88   Temp 36 °C (96.8 °F)   Resp 24   Ht 5' 7\" (1.702 m)   Wt 61.4 kg (135 lb 5.8 oz)   SpO2 100%   BMI 21.20 kg/m²       Patient is status post General anesthesia for Procedure(s):  ENDOSCOPIC RETROGRADE CHOLANGIOPANCREATOGRAPHY (ERCP)  SPHINCTEROTOMY  BILIARY STENT PLACEMENT  ENDOSCOPIC BRUSHING  ENDOSCOPIC STONE EXTRACTION/BALLOON SWEEP. Nausea/Vomiting: None    Postoperative hydration reviewed and adequate. Pain:  Pain Scale 1: Numeric (0 - 10) (07/24/20 1420)  Pain Intensity 1: 0 (07/24/20 1420)   Managed    Neurological Status: At baseline    Mental Status, Level of Consciousness: Alert and  oriented to person, place, and time    Pulmonary Status:   O2 Device: CO2 nasal cannula (07/24/20 1354)   Adequate oxygenation and airway patent    Complications related to anesthesia: None    Post-anesthesia assessment completed. No concerns    Signed By: Nyla Brooks MD     July 24, 2020              Procedure(s):  ENDOSCOPIC RETROGRADE CHOLANGIOPANCREATOGRAPHY (ERCP)  SPHINCTEROTOMY  BILIARY STENT PLACEMENT  ENDOSCOPIC BRUSHING  ENDOSCOPIC STONE EXTRACTION/BALLOON SWEEP. MAC    <BSHSIANPOST>    INITIAL Post-op Vital signs:   Vitals Value Taken Time   BP 0/0 7/24/2020  2:22 PM   Temp     Pulse 79 7/24/2020  2:22 PM   Resp 12 7/24/2020  2:22 PM   SpO2 100 % 7/24/2020  2:22 PM   Vitals shown include unvalidated device data.

## 2020-07-24 NOTE — PROGRESS NOTES
MD requested PTA medication list from patient. This RN attempted to obtain and pt stated \"I take seroquel but I don't know the mg.  I also take another medication but I don't remember right now\"

## 2020-07-24 NOTE — PROGRESS NOTES
Bedside shift change report given to April RN (oncoming nurse) by Kristen Noyola RN (offgoing nurse). Report included the following information SBAR, Kardex, Intake/Output, MAR and Cardiac Rhythm NSR.

## 2020-07-24 NOTE — ROUTINE PROCESS
TRANSFER - IN REPORT:    Verbal report received from Nisha Ruggiero (name) vinita Hammer Roles  being received from ENDO (unit) for ordered procedure      Report consisted of patients Situation, Background, Assessment and   Recommendations(SBAR). Information from the following report(s) Procedure Summary was reviewed with the receiving nurse. Opportunity for questions and clarification was provided. Assessment completed upon patients arrival to unit and care assumed. Removed large stone/stent placed. Clear liquid diet-advance as tolerated   Plan:  Repeat ERCP and Stent removal in 6 weeks; Lap Cholecystectomy on Monday. Will hand off report to April.

## 2020-07-24 NOTE — PROGRESS NOTES
Cardiology Progress Note            Admit Date: 7/22/2020  Admit Diagnosis: Gall stone pancreatitis [K85.10]  Date: 7/24/2020     Time: 10:47 AM    Subjective:  No c/o CP or SOB. Still with some abdominal pain. Assessment and Plan     1. Troponin elevation              - 2.86 --> 2.54  07/22/20   CARDIAC PROCEDURE 07/23/2020 7/23/2020    Narrative Findings:   Left Main: Large-caliber vessel angiographically normal.    Left Anterior Descending coronary artery: Large caliber vessel provides a   large bifurcating diagonal branch proximal segment. LAD has luminal   irregularities. There is corkscrewing of the distal vessel. Left Circumflex coronary artery: Medium caliber vessel provides 2 small   marginal branches. The left circumflex system has luminal irregularities. Right Coronary artery: Dominant. Medium caliber vessel provides a small   posterior descending artery and sternal segment. The right coronary has   luminal irregularities. Corkscrewing of the distal vasculature is noted. Left Ventricle: No aortic valve gradient. Impression: Elevated troponin with chest abdominal discomfort with trivial   coronary artery disease and preserved left ventricular function. This may   represent isolated elevated troponin which is a normal level for the   patient. Alternatively, type II myocardial infarction due to acute   stress. Recommendations: Medical therapy with risk factor modification. Signed by: Niya Singh MD     07/22/20   ECHO ADULT COMPLETE 07/23/2020 7/23/2020    Narrative · LV: Normal wall thickness. Mildly dilated left ventricle. Mild systolic   function. Estimated left ventricular ejection fraction is 55 - 60%. · MV: Moderate mitral valve regurgitation is present. · LA: Mildly dilated left atrium. Signed by: Mary Taylor MD     2.  Gallstone pancreatitis              - MRCP    1. Study is significantly compromised by motion. 2. Cholelithiasis. 3. There is intra and extrahepatic ductal dilatation. Pancreatic duct is dilated        to 6 mm. There is choledocholithiasis with a 0.7 cm calculus at the ampulla and       a 1.3 cm stone in the distal common bile duct                             - Plan for ERCP and then surgery  3. HTN              - -150s              - Lisinopril 20 mg daily - will take a few days for Lisinopril to have full effect      Normal cath and normal echo. No significant CAD on cath. EF 55-60%. She is cleared from Cardiology standpoint   For any necessary procedures and surgeries. Considered low risk for ailyn operative cardiac complications. Will see again as needed.       No results found for: SONY   No past medical history on file. Social History     Tobacco Use    Smoking status: Not on file   Substance Use Topics    Alcohol use: Not on file    Drug use: Not on file           Review of Systems:    [] Patient unable to provide secondary to condition    [x] All systems negative, except as checked below.   Constitutional:    []Weight Change  []Fever   []Chills   []Night Sweats  []Fatigue  []Malaise  []____  ENT/Mouth:     []Hearing Changes  []Ear Pain  []Nasal Congestion   []Sinus Pain  []Hoarseness   []Sore throat  []Rhinorrhea  []Swallowing Difficulty  []____  Eyes:    []Eye Pain  []Swelling  []Redness  []Foreign Body  []Discharge  []Vision Changes  []____  Cardiovascular:    []Chest Pain  []SOB  []PND  []BRADEN  []Orthopnea  []Claudication  []Edema   []Palpitations  []____  Respiratory:    []Cough  []Sputum  []Wheezing,  []SOB  []Hemoptysis  []____  Gastrointestinal:    []Nausea  []Vomiting  []Diarrhea  []Constipation  [x]Pain  []Heartburn  []Anorexia  []Dysphagia  []Hematochezia  []Melena,  []Jaundice  []____  Genitourinary:    []Dysuria  []Urinary Frequency  []Hematuria  []Urinary Incontinence  []Urgency  []Flank Pain  []Hesitancy  []____  Musculoskeletal: []Arthralgias  []Myalgias  []Joint Swelling  []Joint Stiffness  []Back Pain  []Neck Pain  []____  Skin:    []Skin Lesions  []Pruritis  []Hair Changes  []Skin rashes  []____  Neuro:    []Weakness  []Numbness  []Paresthesias  []Loss of Consciousness  []Syncope   []Dizziness  []Headache  []Coordination Changes  []Recent Falls  []____  Psych:    []Anxiety/Depression  []Insomnia  []Memory Changes  []Violence/Abuse Hx.  []____  Heme/Lymph:    []Bruising  []Bleeding  []Lymphadenopathy  []____  Endocrine:    []Polyuria  []Polydipsia  []Temperature Intolerance  []____         Objective:     Physical Exam:                Visit Vitals  /67 (BP 1 Location: Left arm, BP Patient Position: Lying right side)   Pulse 74   Temp 97.8 °F (36.6 °C)   Resp 18   Ht 5' 7\" (1.702 m)   Wt 135 lb 5.8 oz (61.4 kg)   SpO2 97%   BMI 21.20 kg/m²        General Appearance:   Well developed, well nourished,alert and oriented x 3, and   individual in no acute distress. Ears/Nose/Mouth/Throat:    Hearing grossly normal.         Neck:  Supple. Chest:    Lungs clear to auscultation bilaterally. Cardiovascular:   Regular rate and rhythm, S1, S2 normal, no murmur. Abdomen:    Soft, non-tender, bowel sounds are active. Extremities:  No edema bilaterally. Skin:  Warm and dry.      Telemetry: normal sinus rhythm     Data Review:    Labs:    Recent Results (from the past 24 hour(s))   METABOLIC PANEL, COMPREHENSIVE    Collection Time: 07/24/20  3:21 AM   Result Value Ref Range    Sodium 135 (L) 136 - 145 mmol/L    Potassium 3.7 3.5 - 5.1 mmol/L    Chloride 106 97 - 108 mmol/L    CO2 21 21 - 32 mmol/L    Anion gap 8 5 - 15 mmol/L    Glucose 110 (H) 65 - 100 mg/dL    BUN 12 6 - 20 MG/DL    Creatinine 0.66 0.55 - 1.02 MG/DL    BUN/Creatinine ratio 18 12 - 20      GFR est AA >60 >60 ml/min/1.73m2    GFR est non-AA >60 >60 ml/min/1.73m2    Calcium 8.6 8.5 - 10.1 MG/DL    Bilirubin, total 7.4 (H) 0.2 - 1.0 MG/DL    ALT (SGPT) 598 (H) 12 - 78 U/L    AST (SGOT) 355 (H) 15 - 37 U/L    Alk. phosphatase 613 (H) 45 - 117 U/L    Protein, total 7.3 6.4 - 8.2 g/dL    Albumin 3.1 (L) 3.5 - 5.0 g/dL    Globulin 4.2 (H) 2.0 - 4.0 g/dL    A-G Ratio 0.7 (L) 1.1 - 2.2     LIPASE    Collection Time: 07/24/20  3:21 AM   Result Value Ref Range    Lipase >3,000 (H) 73 - 393 U/L   CBC W/O DIFF    Collection Time: 07/24/20  4:06 AM   Result Value Ref Range    WBC 6.6 3.6 - 11.0 K/uL    RBC 3.61 (L) 3.80 - 5.20 M/uL    HGB 11.8 11.5 - 16.0 g/dL    HCT 36.0 35.0 - 47.0 %    MCV 99.7 (H) 80.0 - 99.0 FL    MCH 32.7 26.0 - 34.0 PG    MCHC 32.8 30.0 - 36.5 g/dL    RDW 13.9 11.5 - 14.5 %    PLATELET 304 126 - 781 K/uL    MPV 10.6 8.9 - 12.9 FL    NRBC 0.0 0  WBC    ABSOLUTE NRBC 0.00 0.00 - 0.01 K/uL          Radiology:        Current Facility-Administered Medications   Medication Dose Route Frequency    0.9% sodium chloride infusion 1,000 mL  1,000 mL IntraVENous CONTINUOUS    sodium chloride (NS) flush 5-40 mL  5-40 mL IntraVENous Q8H    sodium chloride (NS) flush 5-40 mL  5-40 mL IntraVENous PRN    acetaminophen (TYLENOL) tablet 650 mg  650 mg Oral Q4H PRN    lisinopriL (PRINIVIL, ZESTRIL) tablet 20 mg  20 mg Oral DAILY    sodium chloride (NS) flush 5-40 mL  5-40 mL IntraVENous Q8H    sodium chloride (NS) flush 5-40 mL  5-40 mL IntraVENous PRN    lactated Ringers infusion  125 mL/hr IntraVENous CONTINUOUS    HYDROmorphone (PF) (DILAUDID) injection 1 mg  1 mg IntraVENous Q4H PRN    ondansetron (ZOFRAN) injection 4 mg  4 mg IntraVENous Q4H PRN    hydrALAZINE (APRESOLINE) 20 mg/mL injection 10 mg  10 mg IntraVENous Q6H PRN       Teagan Trammell.  Cori Prado MD     [facility] [address] [place] [address] 13, [address] [facility] [address]   [facility]   [phone]

## 2020-07-24 NOTE — PROGRESS NOTES
[facility] Adult  Hospitalist Group                                                                                          Hospitalist Progress Note  Kalpana Chowdhury MD  Answering service: [phone] OR [phone] from in house phone        Date of Service:  2020  NAME:  Beulah Humphrey  :  1960  MRN:  [id number]      Admission Summary:   44-year-old female with a past medical history of schizoaffective disorder, who presents to the hospital with abdominal pain. Interval history / Subjective:   F/u for gall stone pancreatitis    Denied any chest pain,SOB,nausea/vomiting. States that abdominal pain is tolerable with IV dilaudid. ERCP scheduled this afternoon  Assessment & Plan:     # Gall stone pancreatitis:  #Transminitis and hyperbilirubinemia  MRCP showed  Cholelithiasis,there is intra and extrahepatic ductal dilatation. Pancreatic duct is dilatedto 6 mm. There is choledocholithiasis with a 0.7 cm calculus at the ampulla and  a 1.3 cm stone in the distal common bile duct  -GI following  -s/p ERCP today -ERCP with biliary sphincterotomy, biliary stone removal, biliary stent placement  -continue IVF  -Receiving prn IV dilaudid.  -Plan for cholecystectomy on monday      #Elevated troponin -Type 2 MI:  -cardiac cath showed trivial CAD  -will start aspirin and statin after surgery once LFTs trend down    #HTN  -on lisinopril.     Code status: full  DVT prophylaxis: heparin    Care Plan discussed with:patient,nurse  Anticipated Disposition:home  Anticipated Discharge: TBD     Hospital Problems  Never Reviewed          Codes Class Noted POA    Elevated troponin ICD-10-CM: R79.89  ICD-9-CM: 790.6  2020 Yes        Gall stone pancreatitis ICD-10-CM: K85.10  ICD-9-CM: 697.7, 574.20  2020 Yes        * (Principal) Chest pain ICD-10-CM: R07.9  ICD-9-CM: 786.50  2020 Yes    Overview Signed 2020 10:31 AM by Campbell Davila MD     Added automatically from request for surgery [id number] Review of Systems:   A comprehensive review of systems was negative except for that written in the HPI. Vital Signs:    Last 24hrs VS reviewed since prior progress note. Most recent are:  Visit Vitals  /85 (BP 1 Location: Right arm, BP Patient Position: Sitting)   Pulse 71   Temp 97.4 °F (36.3 °C)   Resp 15   Ht 5' 7\" (1.702 m)   Wt 61.4 kg (135 lb 5.8 oz)   SpO2 96%   BMI 21.20 kg/m²       No intake or output data in the 24 hours ending 07/24/20 2217     Physical Examination:             Constitutional:  No acute distress, cooperative, pleasant    ENT:  Oral mucosa moist, oropharynx benign. Resp:  clear to auscultation b/l, no wheezing. CV:  Regular rhythm, normal rate, no murmurs    GI:  Soft, non distended, RUQ tendnerness +. normoactive bowel sounds, no hepatosplenomegaly     Musculoskeletal: No LE edema    Neurologic:  Moves all extremities. AAOx3, CN II-XII reviewed     Psych:   Not anxious nor agitated. Skin:  no rashes or ulcers       Data Review:    Review and/or order of clinical lab test  Review and/or order of tests in the radiology section of CPT  Review and/or order of tests in the medicine section of CPT      Labs:     Recent Labs     07/24/20  0406 07/23/20  0550   WBC 6.6 6.3   HGB 11.8 13.6   HCT 36.0 40.8    234     Recent Labs     07/24/20  0321 07/23/20  0550 07/22/20  2300   * 136 137   K 3.7 3.4* 3.6    104 104   CO2 21 25 23   BUN 12 12 11   CREA 0.66 0.84 0.83   * 129* 136*   CA 8.6 9.2 9.6   MG  --   --  2.3   PHOS  --   --  3.9     Recent Labs     07/24/20  0321 07/23/20  0550 07/22/20  2300   * 628* 655*   * 640* 664*   TBILI 7.4* 5.9* 5.7*   TP 7.3 8.1 8.6*   ALB 3.1* 3.6 3.8   GLOB 4.2* 4.5* 4.8*   LPSE >3,000*  --   --      Recent Labs     07/23/20  0550   APTT 25.2      No results for input(s): FE, TIBC, PSAT, FERR in the last 72 hours.    No results found for: FOL, RBCF   No results for input(s): PH, PCO2, PO2 in the last 72 hours.   Recent Labs     07/23/20  0550 07/22/20  2300   TROIQ 2.54* 2.86*     No results found for: CHOL, CHOLX, CHLST, CHOLV, HDL, HDLP, LDL, LDLC, DLDLP, TGLX, TRIGL, TRIGP, CHHD, CHHDX  No results found for: GLUCPOC  Lab Results   Component Value Date/Time    Color DARK YELLOW 07/22/2020 09:30 PM    Appearance CLOUDY (A) 07/22/2020 09:30 PM    Specific gravity 1.030 07/22/2020 09:30 PM    pH (UA) 5.0 07/22/2020 09:30 PM    Protein 30 (A) 07/22/2020 09:30 PM    Glucose 250 (A) 07/22/2020 09:30 PM    Ketone TRACE (A) 07/22/2020 09:30 PM    Urobilinogen 1.0 07/22/2020 09:30 PM    Nitrites Positive (A) 07/22/2020 09:30 PM    Leukocyte Esterase SMALL (A) 07/22/2020 09:30 PM    Epithelial cells MODERATE (A) 07/22/2020 09:30 PM    Bacteria 1+ (A) 07/22/2020 09:30 PM    WBC 0-4 07/22/2020 09:30 PM    RBC 0-5 07/22/2020 09:30 PM         Medications Reviewed:     Current Facility-Administered Medications   Medication Dose Route Frequency    0.9% sodium chloride infusion 1,000 mL  1,000 mL IntraVENous CONTINUOUS    sodium chloride (NS) flush 5-40 mL  5-40 mL IntraVENous Q8H    sodium chloride (NS) flush 5-40 mL  5-40 mL IntraVENous PRN    acetaminophen (TYLENOL) tablet 650 mg  650 mg Oral Q4H PRN    lisinopriL (PRINIVIL, ZESTRIL) tablet 20 mg  20 mg Oral DAILY    sodium chloride (NS) flush 5-40 mL  5-40 mL IntraVENous Q8H    sodium chloride (NS) flush 5-40 mL  5-40 mL IntraVENous PRN    lactated Ringers infusion  125 mL/hr IntraVENous CONTINUOUS    HYDROmorphone (PF) (DILAUDID) injection 1 mg  1 mg IntraVENous Q4H PRN    ondansetron (ZOFRAN) injection 4 mg  4 mg IntraVENous Q4H PRN    hydrALAZINE (APRESOLINE) 20 mg/mL injection 10 mg  10 mg IntraVENous Q6H PRN     ______________________________________________________________________  EXPECTED LENGTH OF STAY: 4d 14h  ACTUAL LENGTH OF STAY:          2                 Daryl Oconnor MD

## 2020-07-24 NOTE — ROUTINE PROCESS
Maxine Austin  1960  [id number]    Situation:  Verbal report received from: Kasey  Procedure: Procedure(s):  ENDOSCOPIC RETROGRADE CHOLANGIOPANCREATOGRAPHY (ERCP)  SPHINCTEROTOMY  BILIARY STENT PLACEMENT  ENDOSCOPIC BRUSHING  ENDOSCOPIC STONE EXTRACTION/BALLOON SWEEP    Background:    Preoperative diagnosis: Biliary Obstruction  Postoperative diagnosis: .    :  Dr. Annamaria Chang  Assistant(s): Endoscopy Technician-1: Munira Sanchez  Endoscopy RN-1: Lesvia Connell RN    Specimens: * No specimens in log *  H. Pylori  no    Assessment:  Intra-procedure medications     Anesthesia gave intra-procedure sedation and medications, see anesthesia flow sheet yes    Intravenous fluids: NS@ KVO     Vital signs stable yes    Abdominal assessment: round and soft yes     Recommendation:  Discharge patient per MD order na.   Return to floor yes  Family or Friend fam  Permission to share finding with family or friend yes

## 2020-07-24 NOTE — PROGRESS NOTES
[facility] Adult  Hospitalist Group                                                                                          Hospitalist Progress Note  Amelia Oh MD  Answering service: [phone] OR [phone] from in house phone        Date of Service:  2020  NAME:  Mehnaz Hoang  :  1960  MRN:  [id number]      Admission Summary:   17-year-old female with a past medical history of schizoaffective disorder, who presents to the hospital with abdominal pain. Interval history / Subjective:   F/u for gall stone pancreatitis    Denied any chest pain,SOB,nausea/vomiting. States that abdominal pain is tolerable with IV dilaudid. Assessment & Plan:     # Gall stone pancreatitis:  MRCP showed  Cholelithiasis,there is intra and extrahepatic ductal dilatation. Pancreatic duct is dilatedto 6 mm. There is choledocholithiasis with a 0.7 cm calculus at the ampulla and  a 1.3 cm stone in the distal common bile duct  -GI following  -plan for ERCP tomorrow and cholecystectomy eventually  -IVF  -Receiving prn IV dilaudid. #Elevated troponin -Type 2 MI:  -cardiac cath showed trivial CAD    #HTN  -on lisinopril. Code status: full  DVT prophylaxis: heparin    Care Plan discussed with:patient,nurse  Anticipated Disposition:home  Anticipated Discharge: TBD     Hospital Problems  Never Reviewed          Codes Class Noted POA    Gall stone pancreatitis ICD-10-CM: K85.10  ICD-9-CM: 294.9, 574.20  2020 Unknown        * (Principal) Chest pain ICD-10-CM: R07.9  ICD-9-CM: 786.50  2020     Overview Signed 2020 10:31 AM by Frankie Silva MD     Added automatically from request for surgery [id number]                     Review of Systems:   A comprehensive review of systems was negative except for that written in the HPI. Vital Signs:    Last 24hrs VS reviewed since prior progress note.  Most recent are:  Visit Vitals  /58 (BP 1 Location: Left arm, BP Patient Position: At rest) Pulse 73   Temp 98.1 °F (36.7 °C)   Resp 18   Ht 5' 7\" (1.702 m)   Wt 71.2 kg (157 lb)   SpO2 98%   BMI 24.59 kg/m²         Intake/Output Summary (Last 24 hours) at 7/23/2020 2124  Last data filed at 7/23/2020 1538  Gross per 24 hour   Intake 800 ml   Output 300 ml   Net 500 ml        Physical Examination:             Constitutional:  No acute distress, cooperative, pleasant    ENT:  Oral mucosa moist, oropharynx benign. Resp:  clear to auscultation b/l, no wheezing. CV:  Regular rhythm, normal rate, no murmurs    GI:  Soft, non distended, RUQ tendnerness +. normoactive bowel sounds, no hepatosplenomegaly     Musculoskeletal:  rt wrist cath site stable    Neurologic:  Moves all extremities. AAOx3, CN II-XII reviewed     Psych:   Not anxious nor agitated. Skin:  no rashes or ulcers       Data Review:    Review and/or order of clinical lab test  Review and/or order of tests in the radiology section of CPT  Review and/or order of tests in the medicine section of CPT      Labs:     Recent Labs     07/23/20  0550 07/22/20  2300   WBC 6.3 5.4   HGB 13.6 13.7   HCT 40.8 40.9    223     Recent Labs     07/23/20  0550 07/22/20  2300    137   K 3.4* 3.6    104   CO2 25 23   BUN 12 11   CREA 0.84 0.83   * 136*   CA 9.2 9.6   MG  --  2.3   PHOS  --  3.9     Recent Labs     07/23/20  0550 07/22/20  2300   * 655*   * 664*   TBILI 5.9* 5.7*   TP 8.1 8.6*   ALB 3.6 3.8   GLOB 4.5* 4.8*     Recent Labs     07/23/20  0550   APTT 25.2      No results for input(s): FE, TIBC, PSAT, FERR in the last 72 hours. No results found for: FOL, RBCF   No results for input(s): PH, PCO2, PO2 in the last 72 hours.   Recent Labs     07/23/20  0550 07/22/20  2300   TROIQ 2.54* 2.86*     No results found for: CHOL, CHOLX, CHLST, CHOLV, HDL, HDLP, LDL, LDLC, DLDLP, TGLX, TRIGL, TRIGP, CHHD, CHHDX  No results found for: Marcy Snow  Lab Results   Component Value Date/Time    Color DARK YELLOW 07/22/2020 09:30 PM    Appearance CLOUDY (A) 07/22/2020 09:30 PM    Specific gravity 1.030 07/22/2020 09:30 PM    pH (UA) 5.0 07/22/2020 09:30 PM    Protein 30 (A) 07/22/2020 09:30 PM    Glucose 250 (A) 07/22/2020 09:30 PM    Ketone TRACE (A) 07/22/2020 09:30 PM    Urobilinogen 1.0 07/22/2020 09:30 PM    Nitrites Positive (A) 07/22/2020 09:30 PM    Leukocyte Esterase SMALL (A) 07/22/2020 09:30 PM    Epithelial cells MODERATE (A) 07/22/2020 09:30 PM    Bacteria 1+ (A) 07/22/2020 09:30 PM    WBC 0-4 07/22/2020 09:30 PM    RBC 0-5 07/22/2020 09:30 PM         Medications Reviewed:     Current Facility-Administered Medications   Medication Dose Route Frequency    0.9% sodium chloride infusion 1,000 mL  1,000 mL IntraVENous CONTINUOUS    sodium chloride (NS) flush 5-40 mL  5-40 mL IntraVENous Q8H    sodium chloride (NS) flush 5-40 mL  5-40 mL IntraVENous PRN    acetaminophen (TYLENOL) tablet 650 mg  650 mg Oral Q4H PRN    lisinopriL (PRINIVIL, ZESTRIL) tablet 20 mg  20 mg Oral DAILY    sodium chloride (NS) flush 5-40 mL  5-40 mL IntraVENous Q8H    sodium chloride (NS) flush 5-40 mL  5-40 mL IntraVENous PRN    lactated Ringers infusion  125 mL/hr IntraVENous CONTINUOUS    HYDROmorphone (PF) (DILAUDID) injection 1 mg  1 mg IntraVENous Q4H PRN    ondansetron (ZOFRAN) injection 4 mg  4 mg IntraVENous Q4H PRN    hydrALAZINE (APRESOLINE) 20 mg/mL injection 10 mg  10 mg IntraVENous Q6H PRN     ______________________________________________________________________  EXPECTED LENGTH OF STAY: 4d 14h  ACTUAL LENGTH OF STAY:          1                 Elio Felix MD

## 2020-07-24 NOTE — PROGRESS NOTES
TRANSFER - OUT REPORT:    Verbal report given to  cyndi Steinberg(name) on Paulino Gordon  being transferred to [facility], [facility](unit) for routine progression of care       Report consisted of patients Situation, Background, Assessment and   Recommendations(SBAR). Information from the following report(s) Procedure Summary, Intake/Output and Recent Results was reviewed with the receiving nurse. Lines:   Peripheral IV 07/23/20 Left Forearm (Active)   Site Assessment Clean, dry, & intact 07/24/20 1239   Phlebitis Assessment 0 07/24/20 1239   Infiltration Assessment 0 07/24/20 1239   Dressing Status Clean, dry, & intact 07/24/20 1239   Dressing Type Transparent 07/24/20 1239   Hub Color/Line Status Infusing;Flushed 07/24/20 1239   Action Taken Open ports on tubing capped 07/23/20 1538   Alcohol Cap Used Yes 07/24/20 0307       Peripheral IV 07/22/20 Right Antecubital (Active)   Site Assessment Clean, dry, & intact 07/24/20 0307   Phlebitis Assessment 0 07/24/20 0307   Infiltration Assessment 0 07/24/20 0307   Dressing Status Clean, dry, & intact 07/24/20 0307   Dressing Type Transparent 07/24/20 0307   Hub Color/Line Status Capped 07/24/20 0307   Action Taken Open ports on tubing capped 07/23/20 1538   Alcohol Cap Used Yes 07/24/20 4721        Opportunity for questions and clarification was provided.

## 2020-07-24 NOTE — PROGRESS NOTES
LEWIS PLAN:    RUR-7%    Patient was admitted from home and will be discharged home in care of his great nephew    Patient will need Medicaid Transportation at discharge    Patient to follow up with PCP/Specialist    Reason for Admission:  Chest pain, s/p Cardiac cath,  S/p ERCP with biliary sphincterotomy, biliary stone removal, biliary stent placement, under fluroscopy                    RUR Score:   7%                  Plan for utilizing home health:   TBD       PCP: First and Last name:  Tony Bah   [facility] of [facility] Are you a current patient: Yes Approximate date of last visit: Last week   Can you participate in a virtual visit with your PCP: No                    Current Advanced Directive/Advance Care. Plan: No and not interested at this time                         Transition of Care Plan:   CM met with patient to discus discharge planning. Patient alert and oriented x 4. Patient said she lives alone in her apartment in [address] [place] [address],[address]. She is independent without any assistive devices. Patient said she has never driven. She did not voice any discharge barriers. CM will follow as needed. Shiraz Bose MSA, RN, CRM. Care Management Interventions  PCP Verified by CM:  Yes  Palliative Care Criteria Met (RRAT>21 & CHF Dx)?: No  Mode of Transport at Discharge: [facility](Medicaid transportation)  Transition of Care Consult (CM Consult): Discharge Planning  MyChart Signup: No  Discharge Durable Medical Equipment: No  Health Maintenance Reviewed: Yes  Physical Therapy Consult: No  Occupational Therapy Consult: No  Speech Therapy Consult: No  Current Support Network: Lives Alone  Confirm Follow Up Transport: Cab  Discharge Location  Discharge Placement: Home with family assistance

## 2020-07-24 NOTE — PROGRESS NOTES
Progress Note    Patient: Beulah Humphrey MRN: [id number]  SSN: [id number]    YOB: 1960  Age: 64 y.o. Sex: female      Admit Date: 2020    1 Day Post-Op    Procedure:  Procedure(s):  CORONARY ANGIOGRAPHY  LEFT HEART CATH    Subjective:     Patient overall feels better, but still with epigastric pain. No fever or chills. .     Objective:     Visit Vitals  /61   Pulse 73   Temp 97.8 °F (36.6 °C)   Resp 18   Ht 5' 7\" (1.702 m)   Wt 135 lb 5.8 oz (61.4 kg)   SpO2 99%   BMI 21.20 kg/m²       Temp (24hrs), Av.2 °F (36.8 °C), Min:97.6 °F (36.4 °C), Max:98.9 °F (37.2 °C)      Physical Exam:    ABDOMEN: Nondistended, soft. Focal upper abdominal pain with palpation. No mass or guarding. Data Review: Vital signs, ins and outs, labs    Lab Review:   Recent Results (from the past 12 hour(s))   METABOLIC PANEL, COMPREHENSIVE    Collection Time: 20  3:21 AM   Result Value Ref Range    Sodium 135 (L) 136 - 145 mmol/L    Potassium 3.7 3.5 - 5.1 mmol/L    Chloride 106 97 - 108 mmol/L    CO2 21 21 - 32 mmol/L    Anion gap 8 5 - 15 mmol/L    Glucose 110 (H) 65 - 100 mg/dL    BUN 12 6 - 20 MG/DL    Creatinine 0.66 0.55 - 1.02 MG/DL    BUN/Creatinine ratio 18 12 - 20      GFR est AA >60 >60 ml/min/1.73m2    GFR est non-AA >60 >60 ml/min/1.73m2    Calcium 8.6 8.5 - 10.1 MG/DL    Bilirubin, total 7.4 (H) 0.2 - 1.0 MG/DL    ALT (SGPT) 598 (H) 12 - 78 U/L    AST (SGOT) 355 (H) 15 - 37 U/L    Alk.  phosphatase 613 (H) 45 - 117 U/L    Protein, total 7.3 6.4 - 8.2 g/dL    Albumin 3.1 (L) 3.5 - 5.0 g/dL    Globulin 4.2 (H) 2.0 - 4.0 g/dL    A-G Ratio 0.7 (L) 1.1 - 2.2     LIPASE    Collection Time: 20  3:21 AM   Result Value Ref Range    Lipase >3,000 (H) 73 - 393 U/L   CBC W/O DIFF    Collection Time: 20  4:06 AM   Result Value Ref Range    WBC 6.6 3.6 - 11.0 K/uL    RBC 3.61 (L) 3.80 - 5.20 M/uL    HGB 11.8 11.5 - 16.0 g/dL    HCT 36.0 35.0 - 47.0 %    MCV 99.7 (H) 80.0 - 99.0 FL    MCH 32.7 26.0 - 34.0 PG    MCHC 32.8 30.0 - 36.5 g/dL    RDW 13.9 11.5 - 14.5 %    PLATELET 809 007 - 886 K/uL    MPV 10.6 8.9 - 12.9 FL    NRBC 0.0 0  WBC    ABSOLUTE NRBC 0.00 0.00 - 0.01 K/uL         Assessment:     Hospital Problems  Never Reviewed          Codes Class Noted POA    Gall stone pancreatitis ICD-10-CM: K85.10  ICD-9-CM: 340.4, 574.20  2020 Unknown        * (Principal) Chest pain ICD-10-CM: R07.9  ICD-9-CM: 786.50  2020     Overview Signed 2020 10:31 AM by Cecil Mcmillan MD     Added automatically from request for surgery [id number]                 Liver function tests remain elevated, with ongoing clinical signs of pancreatitis. Plan/Recommendations/Medical Decision Makin. Continue bowel rest.  2.  ERCP later today. 3.  If ERCP successful and tolerated from a cardiac standpoint, would allow liver function test to transition towards normal over the weekend with plans for laparoscopic cholecystectomy on Monday. Patient in agreement.

## 2020-07-24 NOTE — PROGRESS NOTES
TRANSFER - IN REPORT:    Verbal report received from April, rn(name) on Beulah Humphrey  being received from [facility], rm 440(unit) for ordered procedure      Report consisted of patients Situation, Background, Assessment and   Recommendations(SBAR). Information from the following report(s) SBAR, Kardex, Intake/Output, Recent Results and Procedure Verification was reviewed with the receiving nurse. Opportunity for questions and clarification was provided. Assessment completed upon patients arrival to unit and care assumed.

## 2020-07-24 NOTE — PROGRESS NOTES
Progress Note    Patient: Prachi Abbott MRN: [id number]  SSN: [id number]    YOB: 1960  Age: 64 y.o. Sex: female      Admit Date: 2020    LOS: 2 days     Subjective:     Ms. Jose Martin Godinez is a 61-year-old -American female with schizoaffective disorder admitted with nausea, vomiting, abdominal pain and chest pain. She really presented to [facility] he was diagnosed with gallstone pancreatitis. She also had elevated troponin. Cardiac catheterization on 2020 revealed noncritical coronary disease with preserved left ventricular stock function. This is consistent with either isolated elevated troponin versus type II non-ST elevation myocardial infarction due to supply demand mismatch. Blood pressure was elevated on presentation. Currently the patient feels well. No chest pain or shortness of breath. She continues to have abdominal discomfort controlled with pain medication. Objective:     Vitals:    20 0021 20 0301 20 0307 20 0818   BP:   134/61 152/67   Pulse:   73 74   Resp:   18    Temp:   97.8 °F (36.6 °C) 97.8 °F (36.6 °C)   SpO2:  98% 99% 97%   Weight: 61.4 kg (135 lb 5.8 oz)      Height:          Temp (24hrs), Av.2 °F (36.8 °C), Min:97.8 °F (36.6 °C), Max:98.9 °F (37.2 °C)      Intake and Output:  Current Shift: No intake/output data recorded. Last three shifts: 1901 -  0700  In: 800 [P.O.:800]  Out: 300 [Urine:300]    Physical Exam:  General:  Alert, cooperative, well nourished, well developed, appears stated age   Eyes:  Conjunctivae/corneas clear    Nose: Nares normal. Septum midline. Mucosa normal. No drainage or sinus tenderness. Neck: Supple, symmetrical, trachea midline, no JVD   Lungs:   Clear to auscultation bilaterally, good effort   Heart:  Regular rate and rhythm, no murmur, click, rub, or gallop   Abdomen:    Tender to palpation   Extremities: Normal, atraumatic, no cyanosis or edema. Right forearm without bruising. Radial pulse palpable. Skin: Skin color, texture, turgor normal. No rash or lesions. Neurologic: Non focal       Current Facility-Administered Medications:     0.9% sodium chloride infusion 1,000 mL, 1,000 mL, IntraVENous, CONTINUOUS, Campbell Davila MD, Last Rate: 100 mL/hr at 07/23/20 1717, 1,000 mL at 07/23/20 1717    sodium chloride (NS) flush 5-40 mL, 5-40 mL, IntraVENous, Q8H, Campbell Davila MD, Stopped at 07/23/20 2200    sodium chloride (NS) flush 5-40 mL, 5-40 mL, IntraVENous, PRN, Campbell Davila MD    acetaminophen (TYLENOL) tablet 650 mg, 650 mg, Oral, Q4H PRN, Campbell Davila MD, 650 mg at 07/23/20 1804    lisinopriL (PRINIVIL, ZESTRIL) tablet 20 mg, 20 mg, Oral, DAILY, Roger Dunbar PA-C, Stopped at 07/24/20 0900    sodium chloride (NS) flush 5-40 mL, 5-40 mL, IntraVENous, Q8H, Shelli Salvador MD, Stopped at 07/23/20 2200    sodium chloride (NS) flush 5-40 mL, 5-40 mL, IntraVENous, PRN, Kenna Denver, MD    lactated Ringers infusion, 125 mL/hr, IntraVENous, CONTINUOUS, Kenna Denver, MD, Last Rate: 125 mL/hr at 07/24/20 0356, 125 mL/hr at 07/24/20 0356    HYDROmorphone (PF) (DILAUDID) injection 1 mg, 1 mg, IntraVENous, Q4H PRN, Kenna Denver, MD, 1 mg at 07/24/20 0818    ondansetron (ZOFRAN) injection 4 mg, 4 mg, IntraVENous, Q4H PRN, Kenna Denver, MD    hydrALAZINE (APRESOLINE) 20 mg/mL injection 10 mg, 10 mg, IntraVENous, Q6H PRN, Kenna Denver, MD    Lab/Data Review:  Labs Latest Ref Rng & Units 7/24/2020 7/23/2020 7/22/2020   WBC 3.6 - 11.0 K/uL 6.6 6.3 5.4   RBC 3.80 - 5.20 M/uL 3.61(L) 4.15 4.20   Hemoglobin 11.5 - 16.0 g/dL 11.8 13.6 13.7   Hematocrit 35.0 - 47.0 % 36.0 40.8 40.9   MCV 80.0 - 99.0 FL 99. 7(H) 98.3 97.4   Platelets 775 - 325 K/uL 221 234 223   Lymphocytes 12 - 49 % - 17 -   Monocytes 5 - 13 % - 6 -   Eosinophils 0 - 7 % - 0 -   Basophils 0 - 1 % - 0 -   Albumin 3.5 - 5.0 g/dL 3. 1(L) 3.6 3.8   Calcium 8.5 - 10.1 MG/DL 8.6 9.2 9.6 Glucose 65 - 100 mg/dL 110(H) 129(H) 136(H)   BUN 6 - 20 MG/DL 12 12 11   Creatinine 0.55 - 1.02 MG/DL 0.66 0.84 0.83   Sodium 136 - 145 mmol/L 135(L) 136 137   Potassium 3.5 - 5.1 mmol/L 3.7 3. 4(L) 3.6       07/22/20   ECHO ADULT COMPLETE 07/23/2020 7/23/2020    Narrative · LV: Normal wall thickness. Mildly dilated left ventricle. Mild systolic   function. Estimated left ventricular ejection fraction is 55 - 60%. · MV: Moderate mitral valve regurgitation is present. · LA: Mildly dilated left atrium. Signed by: Kentrell Dotson MD       07/22/20   CARDIAC PROCEDURE 07/23/2020 7/23/2020    Narrative · Trivial CAD by angiogram  · Normal left ventricular systolic funciton by echocardiogram        Cardiac Catheterization Preliminary Note      Patient: Dylan Yates  MRN: [id number]  SSN: [id number]   YOB: 1960 Age: 64 y.o. Sex: female        Date of Procedure: 7/23/2020    Indications: This is a 64 y.o. yrs female who presents with chest and   abdominal pain, nausea and vomiting, and elevated troponin. She is found   to have gallstone pancreatitis that will require further intervention. Procedure:  Informed consent was obtained. Time out was performed. The patient was   brought to the cardiac catheterization lab and the right forearm prepped   and draped in the usual sterile fashion. The right wrist was infiltrated   with lidocaine, and the radial artery accessed via the modified Seldinger   technique. A slender sheath was placed and the radial cocktail   administered. A 5 Fr Jaime catheter was then advanced over a wire into the   left ventricle. Hemodynamic measurements were obtained. A left   ventriculogram was not performed to conserve contrast. The catheter was   pulled back and used to engage the right coronary artery. Selective   angiography was performed in various projections. A 5 Western Deanna JL 3.5   diagnostic catheter was used to engage the left coronary artery.     Selective angiography was performed in various projections. At the   termination of the case, the catheter and sheath were removed and a TR   Band applied until hemostasis was achieved. The patient tolerated the   procedure well and was transferred to recovery in stable condition. Contrast: 43 cc Visipaque. Blood Loss: Minimal.  Radiation: 488 cGy. 5.1 mins. Complications: None    Findings:   Left Main: Large-caliber vessel angiographically normal.    Left Anterior Descending coronary artery: Large caliber vessel provides a   large bifurcating diagonal branch proximal segment. LAD has luminal   irregularities. There is corkscrewing of the distal vessel. Left Circumflex coronary artery: Medium caliber vessel provides 2 small   marginal branches. The left circumflex system has luminal irregularities. Right Coronary artery: Dominant. Medium caliber vessel provides a small   posterior descending artery and sternal segment. The right coronary has   luminal irregularities. Corkscrewing of the distal vasculature is noted. Left Ventricle: No aortic valve gradient. Impression: Elevated troponin with chest abdominal discomfort with trivial   coronary artery disease and preserved left ventricular function. This may   represent isolated elevated troponin which is a normal level for the   patient. Alternatively, type II myocardial infarction due to acute   stress. Recommendations: Medical therapy with risk factor modification.         Mike Ellis MD  7/23/2020, 1:05 PM    [facility] Office:   [address] [place] [address] Office:  [facility]    [address]  P: [phone]    P: [phone]  F: [phone]    F: [phone]     Signed by: Smith Moya MD             Assessment:     Principal Problem:    Chest pain (7/22/2020)      Overview: Added automatically from request for surgery [id number]    Active Problems:    Gall stone pancreatitis (7/22/2020)      Elevated troponin (7/24/2020)        Plan:     Ms. Linnette Ramos is a 53-year-old -American female with acute gallstone pancreatitis. She has ductal dilatation with evidence of stones causing obstruction. Plans for ERCP today and possible laparoscopic cholecystectomy on Monday, 7/27/2020 noted. Cardiac catheterization reveals noncritical coronary disease with preserved left ventricular systolic function. The patient has elevated troponin without evidence of ischemia or infarction. She should be at average cardiovascular risk for procedures and surgery. Recommend starting aspirin 81 mg and statin following surgery.     Signed By: Sondra Galvez MD     July 24, 2020      Interventional Cardiology  [facility] [address] [place] [address] Noemi  [place], [address] [place] [address]  P: [phone]  F: [phone]

## 2020-07-24 NOTE — PROGRESS NOTES
Problem: Falls - Risk of  Goal: *Absence of Falls  Description: Document Maryanne Bridges Fall Risk and appropriate interventions in the flowsheet.   Outcome: Progressing Towards Goal  Note: Fall Risk Interventions:            Medication Interventions: Teach patient to arise slowly

## 2020-07-24 NOTE — PROCEDURES
[address]  [address]                NAME:  Paulino Gordon   :   1960   MRN:   [id number]     [facility]  Date/Time:  2020 2:02 PM    Procedure Type:   ERCPwith biliary sphincterotomy, biliary stone removal, biliary stent placement, under fluroscopy     Indications: jaundice, common bile duct stone    Preoperative diagnosis: Biliary Obstruction    Postoperative diagnosis: * No post-op diagnosis entered *    : Maria Antonia Milligan MD    Referring Provider:  UNKNOWN    Sedation:  General anesthesia  Procedure Details:  After informed consent was obtained with all risks and benefits of procedure explained, the patient was taken to the fluoroscopy suite and placed in the prone position. Upon sequential sedation as per above, the Olympus duodenoscope [id number]   was inserted via the mouthpeice and carefully advanced to the second portion of the duodenum. The quality of visualization was good. The duodenoscope was withdrawn into a short position. Findings:   Findings:   Esophagus:normal  Stomach: normal   Duodenum/jejunum: normal    Ampulla:-normal   Cholangiogram: -Dilated intra and extrahepatic bile duct with large 1.3 cm filling defect in distal CBD with benign appearing stricture at ampulla  Pancreatogram:-Normal PD in head, body and tail not filled    Specimen Removed:  Brushings from  the common bile duct    Complications: None. EBL:  None. Interventions:    Pancreatic: Canulation  successful  Biliary: Canulation  successful,  standard Papillotomy, Stone Extraction  balloon with  complete clearance, Stent Placement a covered metal  4 cm 30  Western Deanna and Tissue Obtained  brushing from  biliary  the common bile duct. Large 1.3 cm CBD stone was removed in fragments with balloon and multiple additional small stone fragments were also removed with balloon sweeps.  Brushings obtained from benign appearing stricture just proximal to ampulla. Impression:  CBD stones removed successfully, benign appearing CBD stricture stented successfully    Recommendations:  Clear liquid diet and advance as tolerated. Repeat ERCP and stent removal in 6 weeks. Brandyn Mccrary on Monday.         Debbie Sheth MD

## 2020-07-25 LAB
ALBUMIN SERPL-MCNC: 2.8 G/DL (ref 3.5–5)
ALBUMIN/GLOB SERPL: 0.8 {RATIO} (ref 1.1–2.2)
ALP SERPL-CCNC: 540 U/L (ref 45–117)
ALT SERPL-CCNC: 460 U/L (ref 12–78)
ANION GAP SERPL CALC-SCNC: 7 MMOL/L (ref 5–15)
AST SERPL-CCNC: 207 U/L (ref 15–37)
BILIRUB SERPL-MCNC: 3.6 MG/DL (ref 0.2–1)
BUN SERPL-MCNC: 8 MG/DL (ref 6–20)
BUN/CREAT SERPL: 13 (ref 12–20)
CALCIUM SERPL-MCNC: 8.2 MG/DL (ref 8.5–10.1)
CHLORIDE SERPL-SCNC: 105 MMOL/L (ref 97–108)
CO2 SERPL-SCNC: 25 MMOL/L (ref 21–32)
CREAT SERPL-MCNC: 0.64 MG/DL (ref 0.55–1.02)
GLOBULIN SER CALC-MCNC: 3.5 G/DL (ref 2–4)
GLUCOSE SERPL-MCNC: 105 MG/DL (ref 65–100)
LIPASE SERPL-CCNC: 197 U/L (ref 73–393)
POTASSIUM SERPL-SCNC: 3.1 MMOL/L (ref 3.5–5.1)
PROT SERPL-MCNC: 6.3 G/DL (ref 6.4–8.2)
SODIUM SERPL-SCNC: 137 MMOL/L (ref 136–145)

## 2020-07-25 PROCEDURE — 65270000032 HC RM SEMIPRIVATE

## 2020-07-25 PROCEDURE — 80053 COMPREHEN METABOLIC PANEL: CPT

## 2020-07-25 PROCEDURE — 94760 N-INVAS EAR/PLS OXIMETRY 1: CPT

## 2020-07-25 PROCEDURE — 36415 COLL VENOUS BLD VENIPUNCTURE: CPT

## 2020-07-25 PROCEDURE — 74011250637 HC RX REV CODE- 250/637: Performed by: PHYSICIAN ASSISTANT

## 2020-07-25 PROCEDURE — 83690 ASSAY OF LIPASE: CPT

## 2020-07-25 PROCEDURE — 74011250636 HC RX REV CODE- 250/636: Performed by: FAMILY MEDICINE

## 2020-07-25 PROCEDURE — 74011250637 HC RX REV CODE- 250/637: Performed by: HOSPITALIST

## 2020-07-25 RX ORDER — HYDROMORPHONE HYDROCHLORIDE 1 MG/ML
0.5 INJECTION, SOLUTION INTRAMUSCULAR; INTRAVENOUS; SUBCUTANEOUS
Status: DISCONTINUED | OUTPATIENT
Start: 2020-07-25 | End: 2020-07-26

## 2020-07-25 RX ORDER — OXYCODONE HYDROCHLORIDE 5 MG/1
5 TABLET ORAL
Status: DISCONTINUED | OUTPATIENT
Start: 2020-07-25 | End: 2020-07-28 | Stop reason: HOSPADM

## 2020-07-25 RX ORDER — POTASSIUM CHLORIDE 750 MG/1
40 TABLET, FILM COATED, EXTENDED RELEASE ORAL
Status: COMPLETED | OUTPATIENT
Start: 2020-07-25 | End: 2020-07-25

## 2020-07-25 RX ADMIN — LISINOPRIL 20 MG: 20 TABLET ORAL at 09:36

## 2020-07-25 RX ADMIN — SODIUM CHLORIDE, SODIUM LACTATE, POTASSIUM CHLORIDE, AND CALCIUM CHLORIDE 125 ML/HR: 600; 310; 30; 20 INJECTION, SOLUTION INTRAVENOUS at 13:00

## 2020-07-25 RX ADMIN — POTASSIUM CHLORIDE 40 MEQ: 750 TABLET, FILM COATED, EXTENDED RELEASE ORAL at 16:16

## 2020-07-25 RX ADMIN — Medication 10 ML: at 14:17

## 2020-07-25 RX ADMIN — Medication 10 ML: at 14:16

## 2020-07-25 RX ADMIN — Medication 10 ML: at 21:49

## 2020-07-25 NOTE — PROGRESS NOTES
1520-TRANSFER - OUT REPORT:    Verbal report given to Leander Torrez (name) on Dennie Patricia  being transferred to Leander Torrez (unit) for routine progression of care       Report consisted of patients Situation, Background, Assessment and   Recommendations(SBAR). Information from the following report(s) SBAR, Intake/Output, MAR and Recent Results was reviewed with the receiving nurse. Lines:   Peripheral IV 07/23/20 Left Forearm (Active)   Site Assessment Clean, dry, & intact 07/25/20 1259   Phlebitis Assessment 0 07/25/20 1259   Infiltration Assessment 0 07/25/20 1259   Dressing Status Clean, dry, & intact 07/25/20 1259   Dressing Type Transparent 07/25/20 1259   Hub Color/Line Status Blue; Infusing 07/25/20 1259   Action Taken Open ports on tubing capped 07/25/20 1259   Alcohol Cap Used Yes 07/25/20 1259       Peripheral IV 07/22/20 Right Antecubital (Active)   Site Assessment Clean, dry, & intact 07/25/20 1259   Phlebitis Assessment 0 07/25/20 1259   Infiltration Assessment 0 07/25/20 1259   Dressing Status Clean, dry, & intact 07/25/20 0933   Dressing Type Tape;Transparent 07/25/20 1259   Hub Color/Line Status Pink;Capped;Flushed 07/25/20 1259   Action Taken Open ports on tubing capped 07/25/20 1259   Alcohol Cap Used Yes 07/25/20 1259        Opportunity for questions and clarification was provided.       Patient transported with:  trNAPORT

## 2020-07-25 NOTE — PROGRESS NOTES
Problem: Falls - Risk of  Goal: *Absence of Falls  Description: Document Wendi Montano Fall Risk and appropriate interventions in the flowsheet.   Outcome: Progressing Towards Goal  Note: Fall Risk Interventions:            Medication Interventions: Patient to call before getting OOB, Teach patient to arise slowly    Elimination Interventions: Call light in reach, Patient to call for help with toileting needs              Problem: Patient Education: Go to Patient Education Activity  Goal: Patient/Family Education  Outcome: Progressing Towards Goal     Problem: Patient Education: Go to Patient Education Activity  Goal: Patient/Family Education  Outcome: Progressing Towards Goal     Problem: Cath Lab Procedures: Pre-Procedure  Goal: Off Pathway (Use only if patient is Off Pathway)  Outcome: Progressing Towards Goal  Goal: Activity/Safety  Outcome: Progressing Towards Goal  Goal: Consults, if ordered  Outcome: Progressing Towards Goal  Goal: Diagnostic Test/Procedures  Outcome: Progressing Towards Goal  Goal: Nutrition/Diet  Outcome: Progressing Towards Goal  Goal: Discharge Planning  Outcome: Progressing Towards Goal  Goal: Medications  Outcome: Progressing Towards Goal  Goal: Respiratory  Outcome: Progressing Towards Goal  Goal: Treatments/Interventions/Procedures  Outcome: Progressing Towards Goal  Goal: Psychosocial  Outcome: Progressing Towards Goal  Goal: *Verbalize description of procedure  Outcome: Progressing Towards Goal  Goal: *Consent signed  Outcome: Progressing Towards Goal     Problem: Cath Lab Procedures: Post-Cath Day of Procedure (Initiate SCIP Measures for Post-Op Care)  Goal: Off Pathway (Use only if patient is Off Pathway)  Outcome: Progressing Towards Goal  Goal: Activity/Safety  Outcome: Progressing Towards Goal  Goal: Consults, if ordered  Outcome: Progressing Towards Goal  Goal: Diagnostic Test/Procedures  Outcome: Progressing Towards Goal  Goal: Nutrition/Diet  Outcome: Progressing Towards Goal  Goal: Discharge Planning  Outcome: Progressing Towards Goal  Goal: Medications  Outcome: Progressing Towards Goal  Goal: Respiratory  Outcome: Progressing Towards Goal  Goal: Treatments/Interventions/Procedures  Outcome: Progressing Towards Goal  Goal: Psychosocial  Outcome: Progressing Towards Goal  Goal: *Procedure site is without bleeding and signs of infection six hours post sheath removal  Outcome: Progressing Towards Goal  Goal: *Hemodynamically stable  Outcome: Progressing Towards Goal  Goal: *Optimal pain control at patient's stated goal  Outcome: Progressing Towards Goal     Problem: Cath Lab Procedures: Post-Cath Day 1  Goal: Off Pathway (Use only if patient is Off Pathway)  Outcome: Progressing Towards Goal  Goal: Activity/Safety  Outcome: Progressing Towards Goal  Goal: Diagnostic Test/Procedures  Outcome: Progressing Towards Goal  Goal: Nutrition/Diet  Outcome: Progressing Towards Goal  Goal: Discharge Planning  Outcome: Progressing Towards Goal  Goal: Medications  Outcome: Progressing Towards Goal  Goal: Respiratory  Outcome: Progressing Towards Goal  Goal: Treatments/Interventions/Procedures  Outcome: Progressing Towards Goal  Goal: Psychosocial  Outcome: Progressing Towards Goal     Problem: Cath Lab Procedures: Discharge Outcomes  Goal: *Stable cardiac rhythm  Outcome: Progressing Towards Goal  Goal: *Hemodynamically stable  Outcome: Progressing Towards Goal  Goal: *Optimal pain control at patient's stated goal  Outcome: Progressing Towards Goal  Goal: *Pulses palpable, skin color within defined limits, skin temperature warm  Outcome: Progressing Towards Goal  Goal: *Lungs clear or at baseline  Outcome: Progressing Towards Goal  Goal: *Demonstrates ability to perform prescribed activity without shortness of breath or discomfort  Outcome: Progressing Towards Goal  Goal: *Verbalizes home exercise program, activity guidelines, cardiac precautions  Outcome: Progressing Towards Goal  Goal: *Verbalizes understanding and describes prescribed diet  Outcome: Progressing Towards Goal  Goal: *Verbalizes understanding and describes medication purposes and frequencies  Outcome: Progressing Towards Goal  Goal: *Identifies cardiac risk factors  Outcome: Progressing Towards Goal  Goal: *No signs and symptoms of infection or wound complications  Outcome: Progressing Towards Goal  Goal: *Anxiety reduced or absent  Outcome: Progressing Towards Goal  Goal: *Verbalizes and demonstrates incision care  Outcome: Progressing Towards Goal  Goal: *Understands and describes signs and symptoms to report to providers(Stroke Metric)  Outcome: Progressing Towards Goal  Goal: *Describes follow-up/return visits to physicians  Outcome: Progressing Towards Goal  Goal: *Describes available resources and support systems  Outcome: Progressing Towards Goal  Goal: *Influenza immunization  Outcome: Progressing Towards Goal  Goal: *Pneumococcal immunization  Outcome: Progressing Towards Goal     Problem: Discharge Planning  Goal: *Discharge to safe environment  Outcome: Progressing Towards Goal

## 2020-07-25 NOTE — PROGRESS NOTES
Bedside shift change report given to Ermelinda Khalil RN (oncoming nurse) by AprilALONSO (offgoing nurse). Report included the following information SBAR, Kardex, Intake/Output, MAR, Accordion and Cardiac Rhythm NSR.

## 2020-07-25 NOTE — PROGRESS NOTES
Problem: Falls - Risk of  Goal: *Absence of Falls  Description: Document Venkat Colvin Fall Risk and appropriate interventions in the flowsheet. 7/25/2020 1512 by Jose Rod RN  Outcome: Progressing Towards Goal  Note: Fall Risk Interventions:         Medication Interventions: Patient to call before getting OOB, Teach patient to arise slowly    Elimination Interventions: Call light in reach, Patient to call for help with toileting needs, Stay With Me (per policy), Toileting schedule/hourly rounds, Urinal in reach           7/25/2020 1511 by Jose Rod RN  Outcome: Progressing Towards Goal  Note: Fall Risk Interventions:            Medication Interventions: Patient to call before getting OOB, Teach patient to arise slowly    Elimination Interventions: Call light in reach, Patient to call for help with toileting needs, Stay With Me (per policy), Toileting schedule/hourly rounds, Urinal in reach              Problem: Pressure Injury - Risk of  Goal: *Prevention of pressure injury  Description: Document Issac Scale and appropriate interventions in the flowsheet. Outcome: Progressing Towards Goal  Note: Pressure Injury Interventions:             Activity Interventions: Pressure redistribution bed/mattress(bed type), Increase time out of bed    Mobility Interventions: Pressure redistribution bed/mattress (bed type), HOB 30 degrees or less    Nutrition Interventions: Document food/fluid/supplement intake  Daily skin assessment performed, no skin issues noted, pt continent of urine and stool

## 2020-07-25 NOTE — PROGRESS NOTES
Bedside and Verbal shift change report given to michele (oncoming nurse) by Aleta Alvarez (offgoing nurse). Report included the following information SBAR, Kardex, Intake/Output, MAR, Recent Results and Cardiac Rhythm NSR.

## 2020-07-25 NOTE — PROGRESS NOTES
[facility] [facility]  Hospitalist Group                                                                                          Hospitalist Progress Note  Kristian Wong MD  Answering service: [phone] OR [phone] from in house phone        Date of Service:  2020  NAME:  Kevin Davis  :  1960  MRN:  [id number]      Admission Summary:   53-year-old female with a past medical history of schizoaffective disorder, who presents to the hospital with abdominal pain. Interval history / Subjective:   F/u for gall stone pancreatitis     She denied any abdominal pain,nausea/vomiting  Assessment & Plan:     # Gall stone pancreatitis:improving  #Transminitis and hyperbilirubinemia-improving  MRCP showed  Cholelithiasis,there is intra and extrahepatic ductal dilatation. Pancreatic duct is dilatedto 6 mm. There is choledocholithiasis with a 0.7 cm calculus at the ampulla and  a 1.3 cm stone in the distal common bile duct  -GI following  -s/p ERCP  -ERCP with biliary sphincterotomy, biliary stone removal, biliary stent placement. OP follow up for stent removal.  -decrease IVF  -Decrease IV dilaudid, can use prn roxycodone  -Advance to full liquid diet  -Plan for cholecystectomy on monday      #Elevated troponin -Type 2 MI:  -cardiac cath showed trivial CAD  -will start aspirin and statin after surgery once LFTs trend down    #HTN  -on lisinopril.     Hypokalemia:  repleted    Moderate mitral regurgitation:  -cardiology follow up    Code status: full  DVT prophylaxis: heparin    Care Plan discussed with:patient,nurse  Anticipated Disposition:home  Anticipated Discharge: TBD     Hospital Problems  Never Reviewed          Codes Class Noted POA    Elevated troponin ICD-10-CM: R79.89  ICD-9-CM: 790.6  2020 Yes        Gall stone pancreatitis ICD-10-CM: K85.10  ICD-9-CM: 557.4, 574.20  2020 Yes        * (Principal) Chest pain ICD-10-CM: R07.9  ICD-9-CM: 786.50  2020 Yes    Overview Signed 7/23/2020 10:31 AM by Maria G Tobin MD     Added automatically from request for surgery [id number]                     Review of Systems:   A comprehensive review of systems was negative except for that written in the HPI. Vital Signs:    Last 24hrs VS reviewed since prior progress note. Most recent are:  Visit Vitals  /71 (BP 1 Location: Right arm, BP Patient Position: Sitting)   Pulse (!) 51   Temp 97.8 °F (36.6 °C)   Resp 18   Ht 5' 7\" (1.702 m)   Wt 66.2 kg (145 lb 15.1 oz)   SpO2 100%   BMI 22.86 kg/m²         Intake/Output Summary (Last 24 hours) at 7/25/2020 1557  Last data filed at 7/25/2020 1259  Gross per 24 hour   Intake 1162.5 ml   Output --   Net 1162.5 ml        Physical Examination:             Constitutional:  No acute distress, cooperative, pleasant    ENT:  Oral mucosa moist, oropharynx benign. Resp:  clear to auscultation b/l, no wheezing. CV:  Regular rhythm, normal rate, S1,S2 wnl    GI:  Soft, non distended, non tender, normoactive bowel sounds    Musculoskeletal: No LE edema    Neurologic:  Moves all extremities. AAOx3, CN II-XII reviewed     Psych:   Not anxious nor agitated.   Skin:  no rashes or ulcers       Data Review:    Review and/or order of clinical lab test  Review and/or order of tests in the medicine section of CPT      Labs:     Recent Labs     07/24/20  0406 07/23/20  0550   WBC 6.6 6.3   HGB 11.8 13.6   HCT 36.0 40.8    234     Recent Labs     07/25/20  0502 07/24/20  0321 07/23/20  0550 07/22/20  2300    135* 136 137   K 3.1* 3.7 3.4* 3.6    106 104 104   CO2 25 21 25 23   BUN 8 12 12 11   CREA 0.64 0.66 0.84 0.83   * 110* 129* 136*   CA 8.2* 8.6 9.2 9.6   MG  --   --   --  2.3   PHOS  --   --   --  3.9     Recent Labs     07/25/20  0502 07/24/20  0321 07/23/20  0550   * 598* 628*   * 613* 640*   TBILI 3.6* 7.4* 5.9*   TP 6.3* 7.3 8.1   ALB 2.8* 3.1* 3.6   GLOB 3.5 4.2* 4.5*   LPSE 197 >3,000*  --      Recent Labs 07/23/20  0550   APTT 25.2      No results for input(s): FE, TIBC, PSAT, FERR in the last 72 hours. No results found for: FOL, RBCF   No results for input(s): PH, PCO2, PO2 in the last 72 hours.   Recent Labs     07/23/20  0550 07/22/20  2300   TROIQ 2.54* 2.86*     No results found for: CHOL, CHOLX, CHLST, CHOLV, HDL, HDLP, LDL, LDLC, DLDLP, TGLX, TRIGL, TRIGP, CHHD, CHHDX  No results found for: GLUCPOC  Lab Results   Component Value Date/Time    Color DARK YELLOW 07/22/2020 09:30 PM    Appearance CLOUDY (A) 07/22/2020 09:30 PM    Specific gravity 1.030 07/22/2020 09:30 PM    pH (UA) 5.0 07/22/2020 09:30 PM    Protein 30 (A) 07/22/2020 09:30 PM    Glucose 250 (A) 07/22/2020 09:30 PM    Ketone TRACE (A) 07/22/2020 09:30 PM    Urobilinogen 1.0 07/22/2020 09:30 PM    Nitrites Positive (A) 07/22/2020 09:30 PM    Leukocyte Esterase SMALL (A) 07/22/2020 09:30 PM    Epithelial cells MODERATE (A) 07/22/2020 09:30 PM    Bacteria 1+ (A) 07/22/2020 09:30 PM    WBC 0-4 07/22/2020 09:30 PM    RBC 0-5 07/22/2020 09:30 PM         Medications Reviewed:     Current Facility-Administered Medications   Medication Dose Route Frequency    sodium chloride (NS) flush 5-40 mL  5-40 mL IntraVENous Q8H    sodium chloride (NS) flush 5-40 mL  5-40 mL IntraVENous PRN    acetaminophen (TYLENOL) tablet 650 mg  650 mg Oral Q4H PRN    lisinopriL (PRINIVIL, ZESTRIL) tablet 20 mg  20 mg Oral DAILY    sodium chloride (NS) flush 5-40 mL  5-40 mL IntraVENous Q8H    sodium chloride (NS) flush 5-40 mL  5-40 mL IntraVENous PRN    lactated Ringers infusion  75 mL/hr IntraVENous CONTINUOUS    HYDROmorphone (PF) (DILAUDID) injection 1 mg  1 mg IntraVENous Q4H PRN    ondansetron (ZOFRAN) injection 4 mg  4 mg IntraVENous Q4H PRN    hydrALAZINE (APRESOLINE) 20 mg/mL injection 10 mg  10 mg IntraVENous Q6H PRN     ______________________________________________________________________  EXPECTED LENGTH OF STAY: 4d 14h  ACTUAL LENGTH OF STAY: [address] Sabina Barrios MD

## 2020-07-25 NOTE — PROGRESS NOTES
Gastroenterology Progress Note    7/25/2020    Admit Date: 7/22/2020    Subjective: Follow up for: gallstone pancreatitis      Feels better and her pain is decreasing. LFTs improving, lipase now normalized. Patient was seen in rounds by me today. S/p ERCP with stone removal and CBD stent placement. Current Facility-Administered Medications   Medication Dose Route Frequency    0.9% sodium chloride infusion 1,000 mL  1,000 mL IntraVENous CONTINUOUS    sodium chloride (NS) flush 5-40 mL  5-40 mL IntraVENous Q8H    sodium chloride (NS) flush 5-40 mL  5-40 mL IntraVENous PRN    acetaminophen (TYLENOL) tablet 650 mg  650 mg Oral Q4H PRN    lisinopriL (PRINIVIL, ZESTRIL) tablet 20 mg  20 mg Oral DAILY    sodium chloride (NS) flush 5-40 mL  5-40 mL IntraVENous Q8H    sodium chloride (NS) flush 5-40 mL  5-40 mL IntraVENous PRN    lactated Ringers infusion  125 mL/hr IntraVENous CONTINUOUS    HYDROmorphone (PF) (DILAUDID) injection 1 mg  1 mg IntraVENous Q4H PRN    ondansetron (ZOFRAN) injection 4 mg  4 mg IntraVENous Q4H PRN    hydrALAZINE (APRESOLINE) 20 mg/mL injection 10 mg  10 mg IntraVENous Q6H PRN        Objective:     Blood pressure 144/56, pulse (!) 54, temperature 98.4 °F (36.9 °C), resp.  rate 18, height 5' 7\" (1.702 m), weight 66.2 kg (145 lb 15.1 oz), SpO2 100 %.    07/25 0701 - 07/25 1900  In: 24 [P.O.:24]  Out: -     07/23 1901 - 07/25 0700  In: 2110 [P.O.:60; I.V.:2050]  Out: -         Physical Examination:       General:AAO x 3,   HEENT:  EOMI,   Chest:  CTA,   Heart: S1, S2, RRR  GI: Soft, + bowel sounds  Extremities: No edema or cyanosis  CNS: CNs grossly normal.    Data Review    Recent Results (from the past 24 hour(s))   METABOLIC PANEL, COMPREHENSIVE    Collection Time: 07/25/20  5:02 AM   Result Value Ref Range    Sodium 137 136 - 145 mmol/L    Potassium 3.1 (L) 3.5 - 5.1 mmol/L    Chloride 105 97 - 108 mmol/L    CO2 25 21 - 32 mmol/L    Anion gap 7 5 - 15 mmol/L Glucose 105 (H) 65 - 100 mg/dL    BUN 8 6 - 20 MG/DL    Creatinine 0.64 0.55 - 1.02 MG/DL    BUN/Creatinine ratio 13 12 - 20      GFR est AA >60 >60 ml/min/1.73m2    GFR est non-AA >60 >60 ml/min/1.73m2    Calcium 8.2 (L) 8.5 - 10.1 MG/DL    Bilirubin, total 3.6 (H) 0.2 - 1.0 MG/DL    ALT (SGPT) 460 (H) 12 - 78 U/L    AST (SGOT) 207 (H) 15 - 37 U/L    Alk. phosphatase 540 (H) 45 - 117 U/L    Protein, total 6.3 (L) 6.4 - 8.2 g/dL    Albumin 2.8 (L) 3.5 - 5.0 g/dL    Globulin 3.5 2.0 - 4.0 g/dL    A-G Ratio 0.8 (L) 1.1 - 2.2     LIPASE    Collection Time: 07/25/20  5:02 AM   Result Value Ref Range    Lipase 197 73 - 393 U/L     Recent Labs     07/24/20  0406 07/23/20  0550   WBC 6.6 6.3   HGB 11.8 13.6   HCT 36.0 40.8    234     Recent Labs     07/25/20  0502 07/24/20  0321 07/23/20  0550 07/22/20  2300    135* 136 137   K 3.1* 3.7 3.4* 3.6    106 104 104   CO2 25 21 25 23   BUN 8 12 12 11   CREA 0.64 0.66 0.84 0.83   * 110* 129* 136*   CA 8.2* 8.6 9.2 9.6   MG  --   --   --  2.3   PHOS  --   --   --  3.9     Recent Labs     07/25/20  0502 07/24/20  0321 07/23/20  0550   * 613* 640*   TP 6.3* 7.3 8.1   ALB 2.8* 3.1* 3.6   GLOB 3.5 4.2* 4.5*   LPSE 197 >3,000*  --      Recent Labs     07/23/20  0550   APTT 25.2      No results for input(s): FE, TIBC, PSAT, FERR in the last 72 hours. No results found for: FOL, RBCF   No results for input(s): PH, PCO2, PO2 in the last 72 hours.   Recent Labs     07/23/20  0550 07/22/20  2300   TROIQ 2.54* 2.86*     No results found for: CHOL, CHOLX, CHLST, CHOLV, HDL, HDLP, LDL, LDLC, DLDLP, TGLX, TRIGL, TRIGP, CHHD, CHHDX  No components found for: [place]  Lab Results   Component Value Date/Time    Color DARK YELLOW 07/22/2020 09:30 PM    Appearance CLOUDY (A) 07/22/2020 09:30 PM    Specific gravity 1.030 07/22/2020 09:30 PM    pH (UA) 5.0 07/22/2020 09:30 PM    Protein 30 (A) 07/22/2020 09:30 PM    Glucose 250 (A) 07/22/2020 09:30 PM    Ketone TRACE (A) 07/22/2020 09:30 PM    Urobilinogen 1.0 07/22/2020 09:30 PM    Nitrites Positive (A) 07/22/2020 09:30 PM    Leukocyte Esterase SMALL (A) 07/22/2020 09:30 PM    Epithelial cells MODERATE (A) 07/22/2020 09:30 PM    Bacteria 1+ (A) 07/22/2020 09:30 PM    WBC 0-4 07/22/2020 09:30 PM    RBC 0-5 07/22/2020 09:30 PM        ROS: -CP, SOB, Dysuria, palpitations, cough. Assessment:    Principal Problem:    Chest pain (7/22/2020)      Overview: Added automatically from request for surgery [id number]    Active Problems:    Gall stone pancreatitis (7/22/2020)      Elevated troponin (7/24/2020)             Plan/Discussion:     · MRCP showed cholelithiasis,intra and extrahepatic ductal dilatation. Pancreatic duct is dilatedto 6 mm. There is choledocholithiasis with a 0.7 cm calculus at the ampulla and  · a 1.3 cm stone in the distal common bile duct She is s/p ERCP with biliary sphincterotomy, biliary stone removal and biliary stent placement. · Follow LFTs. · Diet as tolerated. · CCY after weekend. · Repeat ERCP in 6 weeks to remove CBD stent. Signed By: Thais Bell.  Jorge Gant MD    7/25/2020  10:34 AM

## 2020-07-25 NOTE — PROGRESS NOTES
Problem: Falls - Risk of  Goal: *Absence of Falls  Description: Document Leatha Varela Fall Risk and appropriate interventions in the flowsheet.   Outcome: Progressing Towards Goal  Note: Fall Risk Interventions:            Medication Interventions: Patient to call before getting OOB, Teach patient to arise slowly                   Problem: Patient Education: Go to Patient Education Activity  Goal: Patient/Family Education  Outcome: Progressing Towards Goal     Problem: Patient Education: Go to Patient Education Activity  Goal: Patient/Family Education  Outcome: Progressing Towards Goal     Problem: Cath Lab Procedures: Pre-Procedure  Goal: Off Pathway (Use only if patient is Off Pathway)  Outcome: Progressing Towards Goal  Goal: Activity/Safety  Outcome: Progressing Towards Goal  Goal: Consults, if ordered  Outcome: Progressing Towards Goal  Goal: Diagnostic Test/Procedures  Outcome: Progressing Towards Goal  Goal: Nutrition/Diet  Outcome: Progressing Towards Goal  Goal: Discharge Planning  Outcome: Progressing Towards Goal  Goal: Medications  Outcome: Progressing Towards Goal  Goal: Respiratory  Outcome: Progressing Towards Goal  Goal: Treatments/Interventions/Procedures  Outcome: Progressing Towards Goal  Goal: Psychosocial  Outcome: Progressing Towards Goal  Goal: *Verbalize description of procedure  Outcome: Progressing Towards Goal  Goal: *Consent signed  Outcome: Progressing Towards Goal     Problem: Cath Lab Procedures: Post-Cath Day of Procedure (Initiate SCIP Measures for Post-Op Care)  Goal: Off Pathway (Use only if patient is Off Pathway)  Outcome: Progressing Towards Goal  Goal: Activity/Safety  Outcome: Progressing Towards Goal  Goal: Consults, if ordered  Outcome: Progressing Towards Goal  Goal: Diagnostic Test/Procedures  Outcome: Progressing Towards Goal  Goal: Nutrition/Diet  Outcome: Progressing Towards Goal  Goal: Discharge Planning  Outcome: Progressing Towards Goal  Goal: Medications  Outcome: Progressing Towards Goal  Goal: Respiratory  Outcome: Progressing Towards Goal  Goal: Treatments/Interventions/Procedures  Outcome: Progressing Towards Goal  Goal: Psychosocial  Outcome: Progressing Towards Goal  Goal: *Procedure site is without bleeding and signs of infection six hours post sheath removal  Outcome: Progressing Towards Goal  Goal: *Hemodynamically stable  Outcome: Progressing Towards Goal  Goal: *Optimal pain control at patient's stated goal  Outcome: Progressing Towards Goal     Problem: Cath Lab Procedures: Post-Cath Day 1  Goal: Off Pathway (Use only if patient is Off Pathway)  Outcome: Progressing Towards Goal  Goal: Activity/Safety  Outcome: Progressing Towards Goal  Goal: Diagnostic Test/Procedures  Outcome: Progressing Towards Goal  Goal: Nutrition/Diet  Outcome: Progressing Towards Goal  Goal: Discharge Planning  Outcome: Progressing Towards Goal  Goal: Medications  Outcome: Progressing Towards Goal  Goal: Respiratory  Outcome: Progressing Towards Goal  Goal: Treatments/Interventions/Procedures  Outcome: Progressing Towards Goal  Goal: Psychosocial  Outcome: Progressing Towards Goal     Problem: Cath Lab Procedures: Discharge Outcomes  Goal: *Stable cardiac rhythm  Outcome: Progressing Towards Goal  Goal: *Hemodynamically stable  Outcome: Progressing Towards Goal  Goal: *Optimal pain control at patient's stated goal  Outcome: Progressing Towards Goal  Goal: *Pulses palpable, skin color within defined limits, skin temperature warm  Outcome: Progressing Towards Goal  Goal: *Lungs clear or at baseline  Outcome: Progressing Towards Goal  Goal: *Demonstrates ability to perform prescribed activity without shortness of breath or discomfort  Outcome: Progressing Towards Goal  Goal: *Verbalizes home exercise program, activity guidelines, cardiac precautions  Outcome: Progressing Towards Goal  Goal: *Verbalizes understanding and describes prescribed diet  Outcome: Progressing Towards Goal  Goal: *Verbalizes understanding and describes medication purposes and frequencies  Outcome: Progressing Towards Goal  Goal: *Identifies cardiac risk factors  Outcome: Progressing Towards Goal  Goal: *No signs and symptoms of infection or wound complications  Outcome: Progressing Towards Goal  Goal: *Anxiety reduced or absent  Outcome: Progressing Towards Goal  Goal: *Verbalizes and demonstrates incision care  Outcome: Progressing Towards Goal  Goal: *Understands and describes signs and symptoms to report to providers(Stroke Metric)  Outcome: Progressing Towards Goal  Goal: *Describes follow-up/return visits to physicians  Outcome: Progressing Towards Goal  Goal: *Describes available resources and support systems  Outcome: Progressing Towards Goal  Goal: *Influenza immunization  Outcome: Progressing Towards Goal  Goal: *Pneumococcal immunization  Outcome: Progressing Towards Goal     Problem: Discharge Planning  Goal: *Discharge to safe environment  Outcome: Progressing Towards Goal

## 2020-07-26 LAB
ALBUMIN SERPL-MCNC: 3.2 G/DL (ref 3.5–5)
ALBUMIN/GLOB SERPL: 0.8 {RATIO} (ref 1.1–2.2)
ALP SERPL-CCNC: 600 U/L (ref 45–117)
ALT SERPL-CCNC: 463 U/L (ref 12–78)
ANION GAP SERPL CALC-SCNC: 6 MMOL/L (ref 5–15)
APTT PPP: 26.1 SEC (ref 22.1–32)
AST SERPL-CCNC: 171 U/L (ref 15–37)
BILIRUB SERPL-MCNC: 2.4 MG/DL (ref 0.2–1)
BUN SERPL-MCNC: 6 MG/DL (ref 6–20)
BUN/CREAT SERPL: 8 (ref 12–20)
CALCIUM SERPL-MCNC: 8.9 MG/DL (ref 8.5–10.1)
CHLORIDE SERPL-SCNC: 107 MMOL/L (ref 97–108)
CO2 SERPL-SCNC: 25 MMOL/L (ref 21–32)
CREAT SERPL-MCNC: 0.75 MG/DL (ref 0.55–1.02)
GLOBULIN SER CALC-MCNC: 3.9 G/DL (ref 2–4)
GLUCOSE SERPL-MCNC: 111 MG/DL (ref 65–100)
LIPASE SERPL-CCNC: 152 U/L (ref 73–393)
POTASSIUM SERPL-SCNC: 3.6 MMOL/L (ref 3.5–5.1)
PROT SERPL-MCNC: 7.1 G/DL (ref 6.4–8.2)
SODIUM SERPL-SCNC: 138 MMOL/L (ref 136–145)
THERAPEUTIC RANGE,PTTT: NORMAL SECS (ref 58–77)

## 2020-07-26 PROCEDURE — 74011250637 HC RX REV CODE- 250/637: Performed by: PHYSICIAN ASSISTANT

## 2020-07-26 PROCEDURE — 80053 COMPREHEN METABOLIC PANEL: CPT

## 2020-07-26 PROCEDURE — 36415 COLL VENOUS BLD VENIPUNCTURE: CPT

## 2020-07-26 PROCEDURE — 85730 THROMBOPLASTIN TIME PARTIAL: CPT

## 2020-07-26 PROCEDURE — 83690 ASSAY OF LIPASE: CPT

## 2020-07-26 PROCEDURE — 74011250636 HC RX REV CODE- 250/636: Performed by: HOSPITALIST

## 2020-07-26 PROCEDURE — 65270000032 HC RM SEMIPRIVATE

## 2020-07-26 RX ORDER — CITALOPRAM 40 MG/1
40 TABLET, FILM COATED ORAL DAILY
COMMUNITY
End: 2020-08-11 | Stop reason: SDUPTHER

## 2020-07-26 RX ORDER — QUETIAPINE FUMARATE 100 MG/1
200 TABLET, FILM COATED ORAL EVERY EVENING
COMMUNITY
End: 2021-04-20 | Stop reason: ALTCHOICE

## 2020-07-26 RX ADMIN — SODIUM CHLORIDE, SODIUM LACTATE, POTASSIUM CHLORIDE, AND CALCIUM CHLORIDE 75 ML/HR: 600; 310; 30; 20 INJECTION, SOLUTION INTRAVENOUS at 22:38

## 2020-07-26 RX ADMIN — Medication 10 ML: at 13:36

## 2020-07-26 RX ADMIN — SODIUM CHLORIDE, SODIUM LACTATE, POTASSIUM CHLORIDE, AND CALCIUM CHLORIDE 75 ML/HR: 600; 310; 30; 20 INJECTION, SOLUTION INTRAVENOUS at 08:05

## 2020-07-26 NOTE — PROGRESS NOTES
Progress Note    Patient: Paulino Gordon MRN: [id number]  SSN: [id number]    YOB: 1960  Age: 64 y.o. Sex: female      Admit Date: 2020    2 Days Post-Op    Procedure:  Procedure(s):  ENDOSCOPIC RETROGRADE CHOLANGIOPANCREATOGRAPHY (ERCP)  SPHINCTEROTOMY  BILIARY STENT PLACEMENT  ENDOSCOPIC BRUSHING  ENDOSCOPIC STONE EXTRACTION/BALLOON SWEEP    Subjective:     No acute surgical issues. Pt is doing well. Tolerating full liquid diet without nausea or vomiting. She denied any abdominal pain. LFT trending down    Objective:     Visit Vitals  /68 (BP 1 Location: Right arm, BP Patient Position: Sitting)   Pulse 68   Temp 97.7 °F (36.5 °C)   Resp 16   Ht 5' 7\" (1.702 m)   Wt 145 lb 15.1 oz (66.2 kg)   SpO2 98%   BMI 22.86 kg/m²       Temp (24hrs), Av °F (36.7 °C), Min:97.6 °F (36.4 °C), Max:98.4 °F (36.9 °C)        Physical Exam:    Gen:  NAD  Pulm:  Unlabored  Abd:  S/ND/Non-TTP    Recent Results (from the past 24 hour(s))   PTT    Collection Time: 20  4:25 AM   Result Value Ref Range    aPTT 26.1 22.1 - 32.0 sec    aPTT, therapeutic range     58.0 - 94.2 SECS   METABOLIC PANEL, COMPREHENSIVE    Collection Time: 20  4:25 AM   Result Value Ref Range    Sodium 138 136 - 145 mmol/L    Potassium 3.6 3.5 - 5.1 mmol/L    Chloride 107 97 - 108 mmol/L    CO2 25 21 - 32 mmol/L    Anion gap 6 5 - 15 mmol/L    Glucose 111 (H) 65 - 100 mg/dL    BUN 6 6 - 20 MG/DL    Creatinine 0.75 0.55 - 1.02 MG/DL    BUN/Creatinine ratio 8 (L) 12 - 20      GFR est AA >60 >60 ml/min/1.73m2    GFR est non-AA >60 >60 ml/min/1.73m2    Calcium 8.9 8.5 - 10.1 MG/DL    Bilirubin, total 2.4 (H) 0.2 - 1.0 MG/DL    ALT (SGPT) 463 (H) 12 - 78 U/L    AST (SGOT) 171 (H) 15 - 37 U/L    Alk.  phosphatase 600 (H) 45 - 117 U/L    Protein, total 7.1 6.4 - 8.2 g/dL    Albumin 3.2 (L) 3.5 - 5.0 g/dL    Globulin 3.9 2.0 - 4.0 g/dL    A-G Ratio 0.8 (L) 1.1 - 2.2     LIPASE    Collection Time: 20  4:25 AM   Result Value Ref Range    Lipase 152 73 - 393 U/L       Assessment:     Hospital Problems  Never Reviewed          Codes Class Noted POA    Elevated troponin ICD-10-CM: R79.89  ICD-9-CM: 790.6  7/24/2020 Yes        Gall stone pancreatitis ICD-10-CM: K85.10  ICD-9-CM: 409.6, 574.20  7/22/2020 Yes        * (Principal) Chest pain ICD-10-CM: R07.9  ICD-9-CM: 786.50  7/22/2020 Yes    Overview Signed 7/23/2020 10:31 AM by Qasim Loepz MD     Added automatically from request for surgery [id number]                   Plan/Recommendations/Medical Decision Making:     - Full liquid and NPO after midnight.    - Plan for lap cholecystectomy, possible open with Dr. Tevin Gonzalez in am

## 2020-07-26 NOTE — PROGRESS NOTES
Problem: Falls - Risk of  Goal: *Absence of Falls  Description: Document Greta Patches Fall Risk and appropriate interventions in the flowsheet.   Outcome: Progressing Towards Goal  Note: Fall Risk Interventions:            Medication Interventions: Patient to call before getting OOB    Elimination Interventions: Call light in reach, Patient to call for help with toileting needs              Problem: Patient Education: Go to Patient Education Activity  Goal: Patient/Family Education  Outcome: Progressing Towards Goal

## 2020-07-26 NOTE — PROGRESS NOTES
Admission Medication Reconciliation:    Information obtained from:  [address]:  YES    Comments    Patient reported not needing to take her medications during her stay at the hospital.     Was able update PTA medication list after speaking with Pharmacy staff at a 24hr [address] [facility]. Added:  Celexa 40mg po daily    Adjusted;  Seroquel: 100mg in morning and 200mg in evening      ¹[employer] pharmacy benefit data reflects medications filled and processed through the patient's insurance, however   this data does NOT capture whether the medication was picked up or is currently being taken by the patient. Allergies:  Milk containing products and Gluten    Significant PMH/Disease States:   Past Medical History:   Diagnosis Date    Psychiatric disorder      Chief Complaint for this Admission:  No chief complaint on file. Prior to Admission Medications:   Prior to Admission Medications   Prescriptions Last Dose Informant Patient Reported? Taking? QUEtiapine (SEROqueL) 100 mg tablet   Yes Yes   Sig: Take 100 mg by mouth every morning. Takes 100mg PO in morning and 200mg in evening   QUEtiapine (SEROqueL) 100 mg tablet   Yes Yes   Sig: Take 200 mg by mouth every evening. Takes 100mg PO in morning and 200mg in evening   citalopram (CELEXA) 40 mg tablet   Yes Yes   Sig: Take 40 mg by mouth daily. Facility-Administered Medications: None         Please contact the main inpatient pharmacy with any questions or concerns at [phone] and we will direct you to the clinical pharmacist covering this patient's care while in-house.    Josie Haq, FABIANA

## 2020-07-26 NOTE — PROGRESS NOTES
Gastroenterology Progress Note    7/26/2020    Admit Date: 7/22/2020    Subjective: Follow up for: gallstone pancreatitis      She feels great and denies current GI symptoms. LFTs are  improving, lipase now normalized. Results for Leeann Abreu (MRN [id number]) as of 7/26/2020 11:25   Ref. Range 7/24/2020 03:21 7/25/2020 05:02 7/26/2020 04:25   Bilirubin, total Latest Ref Range: 0.2 - 1.0 MG/DL 7.4 (H) 3.6 (H) 2.4 (H)   ALT Latest Ref Range: 12 - 78 U/L 598 (H) 460 (H) 463 (H)   AST Latest Ref Range: 15 - 37 U/L 355 (H) 207 (H) 171 (H)   Alk. phosphatase Latest Ref Range: 45 - 117 U/L 613 (H) 540 (H) 600 (H)   Lipase Latest Ref Range: 73 - 393 U/L >3,000 (H) 197 152     S/p ERCP with stone removal and CBD stent placement. Current Facility-Administered Medications   Medication Dose Route Frequency    HYDROmorphone (PF) (DILAUDID) injection 0.5 mg  0.5 mg IntraVENous Q6H PRN    oxyCODONE IR (ROXICODONE) tablet 5 mg  5 mg Oral Q6H PRN    sodium chloride (NS) flush 5-40 mL  5-40 mL IntraVENous Q8H    sodium chloride (NS) flush 5-40 mL  5-40 mL IntraVENous PRN    acetaminophen (TYLENOL) tablet 650 mg  650 mg Oral Q4H PRN    lisinopriL (PRINIVIL, ZESTRIL) tablet 20 mg  20 mg Oral DAILY    sodium chloride (NS) flush 5-40 mL  5-40 mL IntraVENous Q8H    sodium chloride (NS) flush 5-40 mL  5-40 mL IntraVENous PRN    lactated Ringers infusion  75 mL/hr IntraVENous CONTINUOUS    ondansetron (ZOFRAN) injection 4 mg  4 mg IntraVENous Q4H PRN        Objective:     Blood pressure 129/68, pulse 68, temperature 97.7 °F (36.5 °C), resp. rate 16, height 5' 7\" (1.702 m), weight 66.2 kg (145 lb 15.1 oz), SpO2 98 %. No intake/output data recorded. 07/24 1901 - 07/26 0700  In: 1162.5 [P.O.:600;  I.V.:562.5]  Out: -         Physical Examination:       General:AAO x 3,   HEENT:  EOMI,   Chest:  CTA,   GI: Soft, + bowel sounds    Data Review    Recent Results (from the past 24 hour(s))   PTT    Collection Time: 07/26/20  4:25 AM   Result Value Ref Range    aPTT 26.1 22.1 - 32.0 sec    aPTT, therapeutic range     58.0 - 17.9 SECS   METABOLIC PANEL, COMPREHENSIVE    Collection Time: 07/26/20  4:25 AM   Result Value Ref Range    Sodium 138 136 - 145 mmol/L    Potassium 3.6 3.5 - 5.1 mmol/L    Chloride 107 97 - 108 mmol/L    CO2 25 21 - 32 mmol/L    Anion gap 6 5 - 15 mmol/L    Glucose 111 (H) 65 - 100 mg/dL    BUN 6 6 - 20 MG/DL    Creatinine 0.75 0.55 - 1.02 MG/DL    BUN/Creatinine ratio 8 (L) 12 - 20      GFR est AA >60 >60 ml/min/1.73m2    GFR est non-AA >60 >60 ml/min/1.73m2    Calcium 8.9 8.5 - 10.1 MG/DL    Bilirubin, total 2.4 (H) 0.2 - 1.0 MG/DL    ALT (SGPT) 463 (H) 12 - 78 U/L    AST (SGOT) 171 (H) 15 - 37 U/L    Alk. phosphatase 600 (H) 45 - 117 U/L    Protein, total 7.1 6.4 - 8.2 g/dL    Albumin 3.2 (L) 3.5 - 5.0 g/dL    Globulin 3.9 2.0 - 4.0 g/dL    A-G Ratio 0.8 (L) 1.1 - 2.2     LIPASE    Collection Time: 07/26/20  4:25 AM   Result Value Ref Range    Lipase 152 73 - 393 U/L     Recent Labs     07/24/20  0406   WBC 6.6   HGB 11.8   HCT 36.0        Recent Labs     07/26/20  0425 07/25/20  0502 07/24/20  0321    137 135*   K 3.6 3.1* 3.7    105 106   CO2 25 25 21   BUN 6 8 12   CREA 0.75 0.64 0.66   * 105* 110*   CA 8.9 8.2* 8.6     Recent Labs     07/26/20  0425 07/25/20  0502 07/24/20  0321   * 540* 613*   TP 7.1 6.3* 7.3   ALB 3.2* 2.8* 3.1*   GLOB 3.9 3.5 4.2*   LPSE 152 197 >3,000*     Recent Labs     07/26/20  0425   APTT 26.1      No results for input(s): FE, TIBC, PSAT, FERR in the last 72 hours. No results found for: FOL, RBCF   No results for input(s): PH, PCO2, PO2 in the last 72 hours. No results for input(s): CPK, CKNDX, TROIQ in the last 72 hours.     No lab exists for component: CPKMB  No results found for: CHOL, CHOLX, CHLST, CHOLV, HDL, HDLP, LDL, LDLC, DLDLP, TGLX, TRIGL, TRIGP, CHHD, CHHDX  No components found for: [place]  Lab Results   Component Value Date/Time    Color DARK YELLOW 07/22/2020 09:30 PM    Appearance CLOUDY (A) 07/22/2020 09:30 PM    Specific gravity 1.030 07/22/2020 09:30 PM    pH (UA) 5.0 07/22/2020 09:30 PM    Protein 30 (A) 07/22/2020 09:30 PM    Glucose 250 (A) 07/22/2020 09:30 PM    Ketone TRACE (A) 07/22/2020 09:30 PM    Urobilinogen 1.0 07/22/2020 09:30 PM    Nitrites Positive (A) 07/22/2020 09:30 PM    Leukocyte Esterase SMALL (A) 07/22/2020 09:30 PM    Epithelial cells MODERATE (A) 07/22/2020 09:30 PM    Bacteria 1+ (A) 07/22/2020 09:30 PM    WBC 0-4 07/22/2020 09:30 PM    RBC 0-5 07/22/2020 09:30 PM        ROS: -CP, SOB, Dysuria, palpitations, cough. Assessment:    Principal Problem:    Chest pain (7/22/2020)      Overview: Added automatically from request for surgery [id number]    Active Problems:    Gall stone pancreatitis (7/22/2020)      Elevated troponin (7/24/2020)             Plan/Discussion:     · MRCP showed cholelithiasis, intra and extrahepatic ductal dilatation. Pancreatic duct dilated to 6 mm and choledocholithiasis with a 0.7 cm calculus at the ampulla and a 1.3 cm stone in the distal common bile duct. She is s/p ERCP with biliary sphincterotomy, biliary stone/s removal and biliary stent placement. · Follow LFTs until normalization. They are improving. · CCY after the weekend as per surgical plan. · Repeat ERCP in 6 weeks' to remove CBD stent. · We will sign off otherwise. Please re-consult us prn        Signed By: Thelma Bonilla.  Juliocesar Nobles MD    7/26/2020  11 29 am

## 2020-07-26 NOTE — PROGRESS NOTES
TRANSITION OF CARE  RUR--7%  Disposition--Return home to independent functioning. Transport-- [facility]. Patient asked to see CM regarding need for new glasses for her nearsightedness. Patient said that her insurance pays for eye exams but not eyeglasses. Her last eye exam was in 2017. CM advised patient that there is no way to arrange for new glasses here at this hospital.  Patient does not have computer access at home.  CM suggested that she ask a friend or relative to order glasses online which can be done for fraction of the usual cost.

## 2020-07-26 NOTE — PROGRESS NOTES
Bedside shift change report given to Rashida Lyman (oncoming nurse) by Sandra Tomlinson RN (offgoing nurse). Report included the following information SBAR, Kardex, Intake/Output, MAR and Recent Results.

## 2020-07-27 ENCOUNTER — ANESTHESIA (OUTPATIENT)
Dept: SURGERY | Age: 60
DRG: 263 | End: 2020-07-27
Payer: MEDICAID

## 2020-07-27 ENCOUNTER — ANESTHESIA EVENT (OUTPATIENT)
Dept: SURGERY | Age: 60
DRG: 263 | End: 2020-07-27
Payer: MEDICAID

## 2020-07-27 LAB
ALBUMIN SERPL-MCNC: 2.9 G/DL (ref 3.5–5)
ALBUMIN/GLOB SERPL: 0.8 {RATIO} (ref 1.1–2.2)
ALP SERPL-CCNC: 527 U/L (ref 45–117)
ALT SERPL-CCNC: 347 U/L (ref 12–78)
ANION GAP SERPL CALC-SCNC: 7 MMOL/L (ref 5–15)
AST SERPL-CCNC: 104 U/L (ref 15–37)
BILIRUB SERPL-MCNC: 1.8 MG/DL (ref 0.2–1)
BUN SERPL-MCNC: 6 MG/DL (ref 6–20)
BUN/CREAT SERPL: 9 (ref 12–20)
CALCIUM SERPL-MCNC: 9.1 MG/DL (ref 8.5–10.1)
CHLORIDE SERPL-SCNC: 109 MMOL/L (ref 97–108)
CO2 SERPL-SCNC: 25 MMOL/L (ref 21–32)
CREAT SERPL-MCNC: 0.69 MG/DL (ref 0.55–1.02)
ERYTHROCYTE [DISTWIDTH] IN BLOOD BY AUTOMATED COUNT: 13.8 % (ref 11.5–14.5)
GLOBULIN SER CALC-MCNC: 3.7 G/DL (ref 2–4)
GLUCOSE SERPL-MCNC: 117 MG/DL (ref 65–100)
HCT VFR BLD AUTO: 31.2 % (ref 35–47)
HGB BLD-MCNC: 10.5 G/DL (ref 11.5–16)
LIPASE SERPL-CCNC: 156 U/L (ref 73–393)
MCH RBC QN AUTO: 33.3 PG (ref 26–34)
MCHC RBC AUTO-ENTMCNC: 33.7 G/DL (ref 30–36.5)
MCV RBC AUTO: 99 FL (ref 80–99)
NRBC # BLD: 0 K/UL (ref 0–0.01)
NRBC BLD-RTO: 0 PER 100 WBC
PLATELET # BLD AUTO: 233 K/UL (ref 150–400)
PMV BLD AUTO: 10.6 FL (ref 8.9–12.9)
POTASSIUM SERPL-SCNC: 3.7 MMOL/L (ref 3.5–5.1)
PROT SERPL-MCNC: 6.6 G/DL (ref 6.4–8.2)
RBC # BLD AUTO: 3.15 M/UL (ref 3.8–5.2)
SODIUM SERPL-SCNC: 141 MMOL/L (ref 136–145)
WBC # BLD AUTO: 5.9 K/UL (ref 3.6–11)

## 2020-07-27 PROCEDURE — 77030011640 HC PAD GRND REM COVD -A: Performed by: SURGERY

## 2020-07-27 PROCEDURE — 77030008684 HC TU ET CUF COVD -B: Performed by: ANESTHESIOLOGY

## 2020-07-27 PROCEDURE — 77030002933 HC SUT MCRYL J&J -A: Performed by: SURGERY

## 2020-07-27 PROCEDURE — 77030010507 HC ADH SKN DERMBND J&J -B: Performed by: SURGERY

## 2020-07-27 PROCEDURE — 77030020263 HC SOL INJ SOD CL0.9% LFCR 1000ML: Performed by: SURGERY

## 2020-07-27 PROCEDURE — 74011000250 HC RX REV CODE- 250: Performed by: SURGERY

## 2020-07-27 PROCEDURE — 76060000035 HC ANESTHESIA 2 TO 2.5 HR: Performed by: SURGERY

## 2020-07-27 PROCEDURE — 0FT44ZZ RESECTION OF GALLBLADDER, PERCUTANEOUS ENDOSCOPIC APPROACH: ICD-10-PCS | Performed by: SURGERY

## 2020-07-27 PROCEDURE — 77030040922 HC BLNKT HYPOTHRM STRY -A

## 2020-07-27 PROCEDURE — 77030012770 HC TRCR OPT FX AMR -B: Performed by: SURGERY

## 2020-07-27 PROCEDURE — 88304 TISSUE EXAM BY PATHOLOGIST: CPT

## 2020-07-27 PROCEDURE — 76210000016 HC OR PH I REC 1 TO 1.5 HR: Performed by: SURGERY

## 2020-07-27 PROCEDURE — 77030018836 HC SOL IRR NACL ICUM -A: Performed by: SURGERY

## 2020-07-27 PROCEDURE — 74011250636 HC RX REV CODE- 250/636: Performed by: ANESTHESIOLOGY

## 2020-07-27 PROCEDURE — 76010000153 HC OR TIME 1.5 TO 2 HR: Performed by: SURGERY

## 2020-07-27 PROCEDURE — 80053 COMPREHEN METABOLIC PANEL: CPT

## 2020-07-27 PROCEDURE — 77030008771 HC TU NG SALEM SUMP -A: Performed by: ANESTHESIOLOGY

## 2020-07-27 PROCEDURE — 74011250637 HC RX REV CODE- 250/637: Performed by: NURSE PRACTITIONER

## 2020-07-27 PROCEDURE — 74011250637 HC RX REV CODE- 250/637: Performed by: ANESTHESIOLOGY

## 2020-07-27 PROCEDURE — 74011250636 HC RX REV CODE- 250/636: Performed by: NURSE ANESTHETIST, CERTIFIED REGISTERED

## 2020-07-27 PROCEDURE — 77030037032 HC INSRT SCIS CLICKLLINE DISP STOR -B: Performed by: SURGERY

## 2020-07-27 PROCEDURE — 74011000250 HC RX REV CODE- 250: Performed by: NURSE ANESTHETIST, CERTIFIED REGISTERED

## 2020-07-27 PROCEDURE — 77030008756 HC TU IRR SUC STRY -B: Performed by: SURGERY

## 2020-07-27 PROCEDURE — 77030009851 HC PCH RTVR ENDOSC AMR -B: Performed by: SURGERY

## 2020-07-27 PROCEDURE — 77030020829: Performed by: SURGERY

## 2020-07-27 PROCEDURE — 74011000250 HC RX REV CODE- 250: Performed by: NURSE PRACTITIONER

## 2020-07-27 PROCEDURE — 74011250636 HC RX REV CODE- 250/636: Performed by: SURGERY

## 2020-07-27 PROCEDURE — 77030008608 HC TRCR ENDOSC SMTH AMR -B: Performed by: SURGERY

## 2020-07-27 PROCEDURE — 36415 COLL VENOUS BLD VENIPUNCTURE: CPT

## 2020-07-27 PROCEDURE — 77030009403 HC ELECTRD ENDO MEGA -B: Performed by: SURGERY

## 2020-07-27 PROCEDURE — 77030010513 HC APPL CLP LIG J&J -C: Performed by: SURGERY

## 2020-07-27 PROCEDURE — 74011000258 HC RX REV CODE- 258: Performed by: SURGERY

## 2020-07-27 PROCEDURE — 77030040361 HC SLV COMPR DVT MDII -B: Performed by: SURGERY

## 2020-07-27 PROCEDURE — 77030035029 HC NDL INSUF VERES DISP COVD -B: Performed by: SURGERY

## 2020-07-27 PROCEDURE — 77030026438 HC STYL ET INTUB CARD -A: Performed by: ANESTHESIOLOGY

## 2020-07-27 PROCEDURE — 77030002895 HC DEV VASC CLOSR COVD -B: Performed by: SURGERY

## 2020-07-27 PROCEDURE — 65410000002 HC RM PRIVATE OB

## 2020-07-27 PROCEDURE — 85027 COMPLETE CBC AUTOMATED: CPT

## 2020-07-27 PROCEDURE — 83690 ASSAY OF LIPASE: CPT

## 2020-07-27 RX ORDER — MIDAZOLAM HYDROCHLORIDE 1 MG/ML
1 INJECTION, SOLUTION INTRAMUSCULAR; INTRAVENOUS AS NEEDED
Status: DISCONTINUED | OUTPATIENT
Start: 2020-07-27 | End: 2020-07-27 | Stop reason: HOSPADM

## 2020-07-27 RX ORDER — QUETIAPINE FUMARATE 100 MG/1
100 TABLET, FILM COATED ORAL
Status: DISCONTINUED | OUTPATIENT
Start: 2020-07-27 | End: 2020-07-28 | Stop reason: HOSPADM

## 2020-07-27 RX ORDER — CITALOPRAM 20 MG/1
40 TABLET, FILM COATED ORAL DAILY
Status: DISCONTINUED | OUTPATIENT
Start: 2020-07-28 | End: 2020-07-28

## 2020-07-27 RX ORDER — SODIUM CHLORIDE 9 MG/ML
25 INJECTION, SOLUTION INTRAVENOUS CONTINUOUS
Status: DISCONTINUED | OUTPATIENT
Start: 2020-07-27 | End: 2020-07-27 | Stop reason: HOSPADM

## 2020-07-27 RX ORDER — SODIUM CHLORIDE, SODIUM LACTATE, POTASSIUM CHLORIDE, CALCIUM CHLORIDE 600; 310; 30; 20 MG/100ML; MG/100ML; MG/100ML; MG/100ML
25 INJECTION, SOLUTION INTRAVENOUS CONTINUOUS
Status: DISCONTINUED | OUTPATIENT
Start: 2020-07-27 | End: 2020-07-27 | Stop reason: HOSPADM

## 2020-07-27 RX ORDER — SODIUM CHLORIDE, SODIUM LACTATE, POTASSIUM CHLORIDE, CALCIUM CHLORIDE 600; 310; 30; 20 MG/100ML; MG/100ML; MG/100ML; MG/100ML
100 INJECTION, SOLUTION INTRAVENOUS CONTINUOUS
Status: DISCONTINUED | OUTPATIENT
Start: 2020-07-27 | End: 2020-07-27 | Stop reason: HOSPADM

## 2020-07-27 RX ORDER — DEXMEDETOMIDINE HYDROCHLORIDE 100 UG/ML
INJECTION, SOLUTION INTRAVENOUS AS NEEDED
Status: DISCONTINUED | OUTPATIENT
Start: 2020-07-27 | End: 2020-07-27 | Stop reason: HOSPADM

## 2020-07-27 RX ORDER — OXYCODONE AND ACETAMINOPHEN 5; 325 MG/1; MG/1
1 TABLET ORAL
Status: DISCONTINUED | OUTPATIENT
Start: 2020-07-27 | End: 2020-07-28 | Stop reason: HOSPADM

## 2020-07-27 RX ORDER — HYDROMORPHONE HYDROCHLORIDE 2 MG/ML
INJECTION, SOLUTION INTRAMUSCULAR; INTRAVENOUS; SUBCUTANEOUS AS NEEDED
Status: DISCONTINUED | OUTPATIENT
Start: 2020-07-27 | End: 2020-07-27 | Stop reason: HOSPADM

## 2020-07-27 RX ORDER — LIDOCAINE HYDROCHLORIDE 20 MG/ML
INJECTION, SOLUTION EPIDURAL; INFILTRATION; INTRACAUDAL; PERINEURAL AS NEEDED
Status: DISCONTINUED | OUTPATIENT
Start: 2020-07-27 | End: 2020-07-27 | Stop reason: HOSPADM

## 2020-07-27 RX ORDER — HYDROMORPHONE HYDROCHLORIDE 1 MG/ML
0.2 INJECTION, SOLUTION INTRAMUSCULAR; INTRAVENOUS; SUBCUTANEOUS
Status: DISCONTINUED | OUTPATIENT
Start: 2020-07-27 | End: 2020-07-27 | Stop reason: HOSPADM

## 2020-07-27 RX ORDER — SODIUM CHLORIDE 0.9 % (FLUSH) 0.9 %
5-40 SYRINGE (ML) INJECTION AS NEEDED
Status: DISCONTINUED | OUTPATIENT
Start: 2020-07-27 | End: 2020-07-27 | Stop reason: HOSPADM

## 2020-07-27 RX ORDER — MORPHINE SULFATE 10 MG/ML
2 INJECTION, SOLUTION INTRAMUSCULAR; INTRAVENOUS
Status: DISCONTINUED | OUTPATIENT
Start: 2020-07-27 | End: 2020-07-27 | Stop reason: HOSPADM

## 2020-07-27 RX ORDER — NEOSTIGMINE METHYLSULFATE 1 MG/ML
INJECTION, SOLUTION INTRAVENOUS AS NEEDED
Status: DISCONTINUED | OUTPATIENT
Start: 2020-07-27 | End: 2020-07-27 | Stop reason: HOSPADM

## 2020-07-27 RX ORDER — ROPIVACAINE HYDROCHLORIDE 5 MG/ML
30 INJECTION, SOLUTION EPIDURAL; INFILTRATION; PERINEURAL AS NEEDED
Status: DISCONTINUED | OUTPATIENT
Start: 2020-07-27 | End: 2020-07-27 | Stop reason: HOSPADM

## 2020-07-27 RX ORDER — LIDOCAINE HYDROCHLORIDE 10 MG/ML
0.1 INJECTION, SOLUTION EPIDURAL; INFILTRATION; INTRACAUDAL; PERINEURAL AS NEEDED
Status: DISCONTINUED | OUTPATIENT
Start: 2020-07-27 | End: 2020-07-27 | Stop reason: HOSPADM

## 2020-07-27 RX ORDER — DIPHENHYDRAMINE HYDROCHLORIDE 50 MG/ML
12.5 INJECTION, SOLUTION INTRAMUSCULAR; INTRAVENOUS AS NEEDED
Status: DISCONTINUED | OUTPATIENT
Start: 2020-07-27 | End: 2020-07-27 | Stop reason: HOSPADM

## 2020-07-27 RX ORDER — SODIUM CHLORIDE 0.9 % (FLUSH) 0.9 %
5-40 SYRINGE (ML) INJECTION EVERY 8 HOURS
Status: DISCONTINUED | OUTPATIENT
Start: 2020-07-27 | End: 2020-07-27 | Stop reason: HOSPADM

## 2020-07-27 RX ORDER — HYDROMORPHONE HYDROCHLORIDE 1 MG/ML
1 INJECTION, SOLUTION INTRAMUSCULAR; INTRAVENOUS; SUBCUTANEOUS
Status: DISCONTINUED | OUTPATIENT
Start: 2020-07-27 | End: 2020-07-28 | Stop reason: HOSPADM

## 2020-07-27 RX ORDER — MIDAZOLAM HYDROCHLORIDE 1 MG/ML
INJECTION, SOLUTION INTRAMUSCULAR; INTRAVENOUS AS NEEDED
Status: DISCONTINUED | OUTPATIENT
Start: 2020-07-27 | End: 2020-07-27 | Stop reason: HOSPADM

## 2020-07-27 RX ORDER — GLYCOPYRROLATE 0.2 MG/ML
INJECTION INTRAMUSCULAR; INTRAVENOUS AS NEEDED
Status: DISCONTINUED | OUTPATIENT
Start: 2020-07-27 | End: 2020-07-27 | Stop reason: HOSPADM

## 2020-07-27 RX ORDER — FENTANYL CITRATE 50 UG/ML
50 INJECTION, SOLUTION INTRAMUSCULAR; INTRAVENOUS AS NEEDED
Status: DISCONTINUED | OUTPATIENT
Start: 2020-07-27 | End: 2020-07-27 | Stop reason: HOSPADM

## 2020-07-27 RX ORDER — MIDAZOLAM HYDROCHLORIDE 1 MG/ML
0.5 INJECTION, SOLUTION INTRAMUSCULAR; INTRAVENOUS
Status: DISCONTINUED | OUTPATIENT
Start: 2020-07-27 | End: 2020-07-27 | Stop reason: HOSPADM

## 2020-07-27 RX ORDER — DEXAMETHASONE SODIUM PHOSPHATE 4 MG/ML
INJECTION, SOLUTION INTRA-ARTICULAR; INTRALESIONAL; INTRAMUSCULAR; INTRAVENOUS; SOFT TISSUE AS NEEDED
Status: DISCONTINUED | OUTPATIENT
Start: 2020-07-27 | End: 2020-07-27 | Stop reason: HOSPADM

## 2020-07-27 RX ORDER — OXYCODONE HYDROCHLORIDE 5 MG/1
5 TABLET ORAL AS NEEDED
Status: DISCONTINUED | OUTPATIENT
Start: 2020-07-27 | End: 2020-07-27 | Stop reason: HOSPADM

## 2020-07-27 RX ORDER — SODIUM CHLORIDE 0.9 % (FLUSH) 0.9 %
5-40 SYRINGE (ML) INJECTION EVERY 8 HOURS
Status: DISCONTINUED | OUTPATIENT
Start: 2020-07-27 | End: 2020-07-28 | Stop reason: HOSPADM

## 2020-07-27 RX ORDER — ACETAMINOPHEN 325 MG/1
650 TABLET ORAL ONCE
Status: DISCONTINUED | OUTPATIENT
Start: 2020-07-27 | End: 2020-07-27 | Stop reason: HOSPADM

## 2020-07-27 RX ORDER — PROPOFOL 10 MG/ML
INJECTION, EMULSION INTRAVENOUS AS NEEDED
Status: DISCONTINUED | OUTPATIENT
Start: 2020-07-27 | End: 2020-07-27 | Stop reason: HOSPADM

## 2020-07-27 RX ORDER — ONDANSETRON 2 MG/ML
4 INJECTION INTRAMUSCULAR; INTRAVENOUS AS NEEDED
Status: DISCONTINUED | OUTPATIENT
Start: 2020-07-27 | End: 2020-07-27 | Stop reason: HOSPADM

## 2020-07-27 RX ORDER — FENTANYL CITRATE 50 UG/ML
INJECTION, SOLUTION INTRAMUSCULAR; INTRAVENOUS AS NEEDED
Status: DISCONTINUED | OUTPATIENT
Start: 2020-07-27 | End: 2020-07-27 | Stop reason: HOSPADM

## 2020-07-27 RX ORDER — ONDANSETRON 2 MG/ML
4 INJECTION INTRAMUSCULAR; INTRAVENOUS
Status: DISCONTINUED | OUTPATIENT
Start: 2020-07-27 | End: 2020-07-28 | Stop reason: HOSPADM

## 2020-07-27 RX ORDER — FENTANYL CITRATE 50 UG/ML
25 INJECTION, SOLUTION INTRAMUSCULAR; INTRAVENOUS
Status: DISCONTINUED | OUTPATIENT
Start: 2020-07-27 | End: 2020-07-27 | Stop reason: HOSPADM

## 2020-07-27 RX ORDER — ROCURONIUM BROMIDE 10 MG/ML
INJECTION, SOLUTION INTRAVENOUS AS NEEDED
Status: DISCONTINUED | OUTPATIENT
Start: 2020-07-27 | End: 2020-07-27 | Stop reason: HOSPADM

## 2020-07-27 RX ORDER — ONDANSETRON 2 MG/ML
INJECTION INTRAMUSCULAR; INTRAVENOUS AS NEEDED
Status: DISCONTINUED | OUTPATIENT
Start: 2020-07-27 | End: 2020-07-27 | Stop reason: HOSPADM

## 2020-07-27 RX ORDER — BUPIVACAINE HYDROCHLORIDE 5 MG/ML
50 INJECTION, SOLUTION EPIDURAL; INTRACAUDAL ONCE
Status: COMPLETED | OUTPATIENT
Start: 2020-07-27 | End: 2020-07-27

## 2020-07-27 RX ORDER — SODIUM CHLORIDE 0.9 % (FLUSH) 0.9 %
5-40 SYRINGE (ML) INJECTION AS NEEDED
Status: DISCONTINUED | OUTPATIENT
Start: 2020-07-27 | End: 2020-07-28 | Stop reason: HOSPADM

## 2020-07-27 RX ORDER — ESMOLOL HYDROCHLORIDE 10 MG/ML
INJECTION INTRAVENOUS AS NEEDED
Status: DISCONTINUED | OUTPATIENT
Start: 2020-07-27 | End: 2020-07-27 | Stop reason: HOSPADM

## 2020-07-27 RX ADMIN — DEXMEDETOMIDINE HYDROCHLORIDE 10 MCG: 100 INJECTION, SOLUTION, CONCENTRATE INTRAVENOUS at 13:00

## 2020-07-27 RX ADMIN — ONDANSETRON HYDROCHLORIDE 4 MG: 2 INJECTION, SOLUTION INTRAMUSCULAR; INTRAVENOUS at 12:21

## 2020-07-27 RX ADMIN — DEXAMETHASONE SODIUM PHOSPHATE 4 MG: 4 INJECTION, SOLUTION INTRAMUSCULAR; INTRAVENOUS at 11:34

## 2020-07-27 RX ADMIN — CEFOTETAN DISODIUM 2 G: 2 INJECTION, POWDER, FOR SOLUTION INTRAMUSCULAR; INTRAVENOUS at 11:15

## 2020-07-27 RX ADMIN — HYDROMORPHONE HYDROCHLORIDE 0.5 MG: 2 INJECTION, SOLUTION INTRAMUSCULAR; INTRAVENOUS; SUBCUTANEOUS at 12:37

## 2020-07-27 RX ADMIN — Medication 2 MG: at 12:35

## 2020-07-27 RX ADMIN — GLYCOPYRROLATE 0.4 MG: 0.2 INJECTION, SOLUTION INTRAMUSCULAR; INTRAVENOUS at 12:35

## 2020-07-27 RX ADMIN — MIDAZOLAM 2 MG: 1 INJECTION INTRAMUSCULAR; INTRAVENOUS at 10:57

## 2020-07-27 RX ADMIN — HYDROMORPHONE HYDROCHLORIDE 1 MG: 1 INJECTION, SOLUTION INTRAMUSCULAR; INTRAVENOUS; SUBCUTANEOUS at 22:46

## 2020-07-27 RX ADMIN — ESMOLOL HYDROCHLORIDE 20 MG: 10 INJECTION, SOLUTION INTRAVENOUS at 12:48

## 2020-07-27 RX ADMIN — ROCURONIUM BROMIDE 30 MG: 10 SOLUTION INTRAVENOUS at 11:05

## 2020-07-27 RX ADMIN — DEXMEDETOMIDINE HYDROCHLORIDE 10 MCG: 100 INJECTION, SOLUTION, CONCENTRATE INTRAVENOUS at 12:51

## 2020-07-27 RX ADMIN — SODIUM CHLORIDE, SODIUM LACTATE, POTASSIUM CHLORIDE, AND CALCIUM CHLORIDE 25 ML/HR: 600; 310; 30; 20 INJECTION, SOLUTION INTRAVENOUS at 10:23

## 2020-07-27 RX ADMIN — PROPOFOL 150 MG: 10 INJECTION, EMULSION INTRAVENOUS at 11:05

## 2020-07-27 RX ADMIN — PROPOFOL 40 MG: 10 INJECTION, EMULSION INTRAVENOUS at 12:35

## 2020-07-27 RX ADMIN — FENTANYL CITRATE 50 MCG: 50 INJECTION, SOLUTION INTRAMUSCULAR; INTRAVENOUS at 11:25

## 2020-07-27 RX ADMIN — PROPOFOL 50 MG: 10 INJECTION, EMULSION INTRAVENOUS at 11:38

## 2020-07-27 RX ADMIN — HYDROMORPHONE HYDROCHLORIDE 0.5 MG: 2 INJECTION, SOLUTION INTRAMUSCULAR; INTRAVENOUS; SUBCUTANEOUS at 11:36

## 2020-07-27 RX ADMIN — HYDROMORPHONE HYDROCHLORIDE 0.5 MG: 2 INJECTION, SOLUTION INTRAMUSCULAR; INTRAVENOUS; SUBCUTANEOUS at 12:43

## 2020-07-27 RX ADMIN — ESMOLOL HYDROCHLORIDE 30 MG: 10 INJECTION, SOLUTION INTRAVENOUS at 12:43

## 2020-07-27 RX ADMIN — LIDOCAINE HYDROCHLORIDE 60 MG: 20 INJECTION, SOLUTION EPIDURAL; INFILTRATION; INTRACAUDAL; PERINEURAL at 11:05

## 2020-07-27 RX ADMIN — HYDROMORPHONE HYDROCHLORIDE 1 MG: 1 INJECTION, SOLUTION INTRAMUSCULAR; INTRAVENOUS; SUBCUTANEOUS at 18:08

## 2020-07-27 RX ADMIN — FENTANYL CITRATE 50 MCG: 50 INJECTION, SOLUTION INTRAMUSCULAR; INTRAVENOUS at 11:05

## 2020-07-27 RX ADMIN — LIDOCAINE HYDROCHLORIDE 40 ML: 20 SOLUTION ORAL; TOPICAL at 21:07

## 2020-07-27 RX ADMIN — SODIUM CHLORIDE, SODIUM LACTATE, POTASSIUM CHLORIDE, AND CALCIUM CHLORIDE 75 ML/HR: 600; 310; 30; 20 INJECTION, SOLUTION INTRAVENOUS at 13:15

## 2020-07-27 RX ADMIN — ESMOLOL HYDROCHLORIDE 30 MG: 10 INJECTION, SOLUTION INTRAVENOUS at 12:41

## 2020-07-27 RX ADMIN — HYDROMORPHONE HYDROCHLORIDE 0.5 MG: 2 INJECTION, SOLUTION INTRAMUSCULAR; INTRAVENOUS; SUBCUTANEOUS at 11:32

## 2020-07-27 RX ADMIN — HYDROMORPHONE HYDROCHLORIDE 1 MG: 1 INJECTION, SOLUTION INTRAMUSCULAR; INTRAVENOUS; SUBCUTANEOUS at 15:03

## 2020-07-27 NOTE — PROGRESS NOTES
TRANSFER - OUT REPORT:    Verbal report given to Marlyn Meza RN(name) on Beulah Humphrey  being transferred to [facility](unit) for routine post - op       Report consisted of patients Situation, Background, Assessment and   Recommendations(SBAR). Information from the following report(s) SBAR, Kardex, Procedure Summary, Intake/Output, MAR and Recent Results was reviewed with the receiving nurse. Lines:   Peripheral IV 07/23/20 Left Forearm (Active)   Site Assessment Clean, dry, & intact 07/27/20 0430   Phlebitis Assessment 0 07/27/20 0430   Infiltration Assessment 0 07/27/20 0430   Dressing Status Clean, dry, & intact 07/27/20 0430   Dressing Type Transparent 07/27/20 0430   Hub Color/Line Status Blue; Infusing 07/27/20 0430   Action Taken Open ports on tubing capped 07/27/20 0430   Alcohol Cap Used Yes 07/27/20 0430        Opportunity for questions and clarification was provided.       Patient transported with:   Tech, Patient's belongings from home

## 2020-07-27 NOTE — ANESTHESIA POSTPROCEDURE EVALUATION
Post-Anesthesia Evaluation and Assessment    Patient: Ann Hanson MRN: [id number]  SSN: [id number]    YOB: 1960  Age: 64 y.o. Sex: female      I have evaluated the patient and they are stable and ready for discharge from the PACU. Cardiovascular Function/Vital Signs  Visit Vitals  /80 (BP 1 Location: Right arm, BP Patient Position: At rest)   Pulse (!) 50   Temp 36.7 °C (98 °F)   Resp 16   Ht 5' 7\" (1.702 m)   Wt 66.2 kg (145 lb 15.1 oz)   SpO2 95%   BMI 22.86 kg/m²       Patient is status post General anesthesia for Procedure(s):  LAPAROSCOPIC CHOLECYSTECTOMY. Nausea/Vomiting: None    Postoperative hydration reviewed and adequate. Pain:  Pain Scale 1: (\" I hurt a little , but it's okay\") (07/27/20 1345)  Pain Intensity 1: 0 (07/27/20 1012)   Managed    Neurological Status:   Neuro (WDL): Exceptions to WDL(slightly drowsy) (07/27/20 1345)  Neuro  LUE Motor Response: Purposeful (07/27/20 1345)  LLE Motor Response: Purposeful (07/27/20 1345)  RUE Motor Response: Purposeful (07/27/20 1345)  RLE Motor Response: Purposeful (07/27/20 1345)   At baseline    Mental Status, Level of Consciousness: Alert and  oriented to person, place, and time    Pulmonary Status:   O2 Device: Nasal cannula (07/27/20 1345)   Adequate oxygenation and airway patent    Complications related to anesthesia: None    Post-anesthesia assessment completed. No concerns    Signed By: Nga French MD     July 27, 2020              Procedure(s):  LAPAROSCOPIC CHOLECYSTECTOMY. general    <BSHSIANPOST>    INITIAL Post-op Vital signs:   Vitals Value Taken Time   /76 7/27/2020  2:15 PM   Temp 36.7 °C (98 °F) 7/27/2020 12:58 PM   Pulse 62 7/27/2020  2:18 PM   Resp 17 7/27/2020  2:18 PM   SpO2 88 % 7/27/2020  2:18 PM   Vitals shown include unvalidated device data.

## 2020-07-27 NOTE — PROGRESS NOTES
TRANSFER - IN REPORT:    Verbal report received from Kristy RN(name) on Paola Calderon  being received from [facility](unit) for routine progression of care      Report consisted of patients Situation, Background, Assessment and   Recommendations(SBAR). Information from the following report(s) SBAR, Kardex, Intake/Output, MAR and Recent Results was reviewed with the receiving nurse. Opportunity for questions and clarification was provided. Assessment completed upon patients arrival to unit and care assumed.

## 2020-07-27 NOTE — PROGRESS NOTES
[facility]  Hospitalist Group                                                                                          Hospitalist Progress Note  Simon Garcia MD  Answering service: [phone] OR [phone] from in house phone        Date of Service:  2020  NAME:  Jaquelin Pena  :  1960  MRN:  [id number]      Admission Summary:   53-year-old female with a past medical history of schizoaffective disorder, who presents to the hospital with abdominal pain. Interval history / Subjective:   F/u for gall stone pancreatitis     Denied any nausea/vomiting,abdominal pain  Plan for cholecystectomy today  Assessment & Plan:     # Gall stone pancreatitis:improved  #Transminitis and hyperbilirubinemia-improving  MRCP showed  Cholelithiasis,there is intra and extrahepatic ductal dilatation. Pancreatic duct is dilatedto 6 mm. There is choledocholithiasis with a 0.7 cm calculus at the ampulla and  a 1.3 cm stone in the distal common bile duct  -GI following  -s/p ERCP  -ERCP with biliary sphincterotomy, biliary stone removal, biliary stent placement. OP follow up for stent removal.  -Continue IVF for now  - prn roxycodone  -NPO   -Plan for cholecystectomy today  LFTs trending down      #Elevated troponin -Type 2 MI:  -cardiac cath showed trivial CAD  -will start aspirin and statin after surgery once LFTs trend down    #HTN  -on lisinopril.     Hypokalemia:  repleted    Moderate mitral regurgitation:  -cardiology follow up    Schizophrenia:  -per med reconcilation-on seroquel and celexa-resume    Code status: full  DVT prophylaxis: heparin    Care Plan discussed with:patient,nurse  Anticipated Disposition:home  Anticipated Discharge: TBD     Hospital Problems  Date Reviewed: 2020          Codes Class Noted POA    Elevated troponin ICD-10-CM: R79.89  ICD-9-CM: 790.6  2020 Yes        Gall stone pancreatitis ICD-10-CM: K85.10  ICD-9-CM: 445.2, 574.20  2020 Yes        * (Principal) Chest pain ICD-10-CM: R07.9  ICD-9-CM: 786.50  7/22/2020 Yes    Overview Signed 7/23/2020 10:31 AM by La Renee MD     Added automatically from request for surgery [id number]                     Review of Systems:   A comprehensive review of systems was negative except for that written in the HPI. Vital Signs:    Last 24hrs VS reviewed since prior progress note. Most recent are:  Visit Vitals  /61   Pulse (!) 50   Temp 98 °F (36.7 °C)   Resp 20   Ht 5' 7\" (1.702 m)   Wt 66.2 kg (145 lb 15.1 oz)   SpO2 100%   BMI 22.86 kg/m²         Intake/Output Summary (Last 24 hours) at 7/27/2020 1309  Last data filed at 7/27/2020 1251  Gross per 24 hour   Intake --   Output 0 ml   Net 0 ml        Physical Examination:             Constitutional:  No acute distress, cooperative, pleasant    ENT:  Oral mucosa moist, oropharynx benign. Resp:  clear to auscultation b/l, no wheezing. CV:  Regular rhythm, normal rate, S1,S2 wnl    GI:  Soft, non distended, non tender, normoactive bowel sounds    Musculoskeletal: No LE edema    Neurologic:  Moves all extremities. AAOx3     Psych:   Not anxious nor agitated. Skin:  no rashes or ulcers       Data Review:    Review and/or order of clinical lab test  Review and/or order of tests in the medicine section of CPT      Labs:     Recent Labs     07/27/20 0435   WBC 5.9   HGB 10.5*   HCT 31.2*        Recent Labs     07/27/20 0435 07/26/20 0425 07/25/20  0502    138 137   K 3.7 3.6 3.1*   * 107 105   CO2 25 25 25   BUN 6 6 8   CREA 0.69 0.75 0.64   * 111* 105*   CA 9.1 8.9 8.2*     Recent Labs     07/27/20 0435 07/26/20  0425 07/25/20  0502   * 463* 460*   * 600* 540*   TBILI 1.8* 2.4* 3.6*   TP 6.6 7.1 6.3*   ALB 2.9* 3.2* 2.8*   GLOB 3.7 3.9 3.5   LPSE 156 152 197     Recent Labs     07/26/20  0425   APTT 26.1      No results for input(s): FE, TIBC, PSAT, FERR in the last 72 hours.    No results found for: FOL, RBCF   No results for input(s): PH, PCO2, PO2 in the last 72 hours. No results for input(s): CPK, CKNDX, TROIQ in the last 72 hours.     No lab exists for component: CPKMB  No results found for: CHOL, CHOLX, CHLST, CHOLV, HDL, HDLP, LDL, LDLC, DLDLP, TGLX, TRIGL, TRIGP, CHHD, CHHDX  No results found for: GLUCPOC  Lab Results   Component Value Date/Time    Color DARK YELLOW 07/22/2020 09:30 PM    Appearance CLOUDY (A) 07/22/2020 09:30 PM    Specific gravity 1.030 07/22/2020 09:30 PM    pH (UA) 5.0 07/22/2020 09:30 PM    Protein 30 (A) 07/22/2020 09:30 PM    Glucose 250 (A) 07/22/2020 09:30 PM    Ketone TRACE (A) 07/22/2020 09:30 PM    Urobilinogen 1.0 07/22/2020 09:30 PM    Nitrites Positive (A) 07/22/2020 09:30 PM    Leukocyte Esterase SMALL (A) 07/22/2020 09:30 PM    Epithelial cells MODERATE (A) 07/22/2020 09:30 PM    Bacteria 1+ (A) 07/22/2020 09:30 PM    WBC 0-4 07/22/2020 09:30 PM    RBC 0-5 07/22/2020 09:30 PM         Medications Reviewed:     Current Facility-Administered Medications   Medication Dose Route Frequency    lactated Ringers infusion  100 mL/hr IntraVENous CONTINUOUS    sodium chloride (NS) flush 5-40 mL  5-40 mL IntraVENous Q8H    sodium chloride (NS) flush 5-40 mL  5-40 mL IntraVENous PRN    oxyCODONE IR (ROXICODONE) tablet 5 mg  5 mg Oral PRN    fentaNYL citrate (PF) injection 25 mcg  25 mcg IntraVENous Multiple    morphine 10 mg/ml injection 2 mg  2 mg IntraVENous Multiple    HYDROmorphone (PF) (DILAUDID) injection 0.2 mg  0.2 mg IntraVENous Multiple    ondansetron (ZOFRAN) injection 4 mg  4 mg IntraVENous PRN    midazolam (VERSED) injection 0.5 mg  0.5 mg IntraVENous Q5MIN PRN    diphenhydrAMINE (BENADRYL) injection 12.5 mg  12.5 mg IntraVENous PRN    meperidine (DEMEROL) injection 12.5 mg  12.5 mg IntraVENous ONCE    oxyCODONE IR (ROXICODONE) tablet 5 mg  5 mg Oral Q6H PRN    sodium chloride (NS) flush 5-40 mL  5-40 mL IntraVENous Q8H    sodium chloride (NS) flush 5-40 mL  5-40 mL IntraVENous PRN  acetaminophen (TYLENOL) tablet 650 mg  650 mg Oral Q4H PRN    lisinopriL (PRINIVIL, ZESTRIL) tablet 20 mg  20 mg Oral DAILY    sodium chloride (NS) flush 5-40 mL  5-40 mL IntraVENous Q8H    sodium chloride (NS) flush 5-40 mL  5-40 mL IntraVENous PRN    lactated Ringers infusion  75 mL/hr IntraVENous CONTINUOUS    ondansetron (ZOFRAN) injection 4 mg  4 mg IntraVENous Q4H PRN     ______________________________________________________________________  EXPECTED LENGTH OF STAY: 4d 14h  ACTUAL LENGTH OF STAY:          5                 Ruchi Zapien MD

## 2020-07-27 NOTE — PROGRESS NOTES
Bedside shift change report given to [facility] ALONSO LEMA and Emily López RN (oncoming nurse) by Aye Braga RN (offgoing nurse). Report included the following information SBAR, Kardex, Intake/Output, MAR and Recent Results.

## 2020-07-27 NOTE — ANESTHESIA PREPROCEDURE EVALUATION
Relevant Problems   No relevant active problems       Anesthetic History   No history of anesthetic complications            Review of Systems / Medical History  Patient summary reviewed, nursing notes reviewed and pertinent labs reviewed    Pulmonary  Within defined limits                 Neuro/Psych         Psychiatric history     Cardiovascular  Within defined limits                     GI/Hepatic/Renal  Within defined limits              Endo/Other        Anemia     Other Findings   Comments: Gallstone pancreatitis           Physical Exam    Airway  Mallampati: II  TM Distance: 4 - 6 cm  Neck ROM: normal range of motion   Mouth opening: Normal     Cardiovascular  Regular rate and rhythm,  S1 and S2 normal,  no murmur, click, rub, or gallop             Dental  No notable dental hx       Pulmonary  Breath sounds clear to auscultation               Abdominal  GI exam deferred       Other Findings            Anesthetic Plan    ASA: 2  Anesthesia type: general          Induction: Intravenous  Anesthetic plan and risks discussed with: Patient

## 2020-07-27 NOTE — PROGRESS NOTES
TRANSFER - IN REPORT:    Verbal report received from Prabhakar Perrin  being received from PACU (unit) for routine post - op      Report consisted of patients Situation, Background, Assessment and   Recommendations(SBAR). Information from the following report(s) SBAR, Kardex, Intake/Output, MAR, Accordion and Recent Results was reviewed with the receiving nurse. Opportunity for questions and clarification was provided. Assessment completed upon patients arrival to unit and care assumed.

## 2020-07-27 NOTE — BRIEF OP NOTE
Brief Postoperative Note    Patient: Keren Lee  YOB: 1960  MRN: [id number]    Date of Procedure: 7/27/2020     Pre-Op Diagnosis: GALLSTONE PANCREATITIS    Post-Op Diagnosis: CHRONIC CHOLECYSTITIS      Procedure(s):  LAPAROSCOPIC CHOLECYSTECTOMY    Surgeon(s):  Prakash Tyler MD    Surgical Assistant: None    Anesthesia: General     Estimated Blood Loss (mL): 634 Ml    Complications: None    Specimens:   ID Type Source Tests Collected by Time Destination   1 : Gallbladder    Prakash Tyler MD 7/27/2020 1156 Pathology        Implants: * No implants in log *    Drains: * No LDAs found *    Findings: Chronic cholecystitis with dilated CHD, CBD.     Electronically Signed by Glenny Martin MD on 7/27/2020 at 12:34 PM

## 2020-07-28 VITALS
DIASTOLIC BLOOD PRESSURE: 78 MMHG | TEMPERATURE: 98.7 F | RESPIRATION RATE: 16 BRPM | SYSTOLIC BLOOD PRESSURE: 163 MMHG | BODY MASS INDEX: 22.91 KG/M2 | HEIGHT: 67 IN | OXYGEN SATURATION: 95 % | WEIGHT: 145.94 LBS | HEART RATE: 95 BPM

## 2020-07-28 LAB
ALBUMIN SERPL-MCNC: 3.4 G/DL (ref 3.5–5)
ALBUMIN/GLOB SERPL: 0.9 {RATIO} (ref 1.1–2.2)
ALP SERPL-CCNC: 525 U/L (ref 45–117)
ALT SERPL-CCNC: 350 U/L (ref 12–78)
ANION GAP SERPL CALC-SCNC: 7 MMOL/L (ref 5–15)
ANION GAP SERPL CALC-SCNC: 8 MMOL/L (ref 5–15)
AST SERPL-CCNC: 98 U/L (ref 15–37)
BASOPHILS # BLD: 0 K/UL (ref 0–0.1)
BASOPHILS NFR BLD: 0 % (ref 0–1)
BILIRUB SERPL-MCNC: 1.5 MG/DL (ref 0.2–1)
BUN SERPL-MCNC: 5 MG/DL (ref 6–20)
BUN SERPL-MCNC: 6 MG/DL (ref 6–20)
BUN/CREAT SERPL: 6 (ref 12–20)
BUN/CREAT SERPL: 8 (ref 12–20)
CALCIUM SERPL-MCNC: 8.9 MG/DL (ref 8.5–10.1)
CALCIUM SERPL-MCNC: 8.9 MG/DL (ref 8.5–10.1)
CHLORIDE SERPL-SCNC: 93 MMOL/L (ref 97–108)
CHLORIDE SERPL-SCNC: 98 MMOL/L (ref 97–108)
CO2 SERPL-SCNC: 25 MMOL/L (ref 21–32)
CO2 SERPL-SCNC: 28 MMOL/L (ref 21–32)
CREAT SERPL-MCNC: 0.77 MG/DL (ref 0.55–1.02)
CREAT SERPL-MCNC: 0.78 MG/DL (ref 0.55–1.02)
DIFFERENTIAL METHOD BLD: ABNORMAL
EOSINOPHIL # BLD: 0.1 K/UL (ref 0–0.4)
EOSINOPHIL NFR BLD: 1 % (ref 0–7)
ERYTHROCYTE [DISTWIDTH] IN BLOOD BY AUTOMATED COUNT: 13.3 % (ref 11.5–14.5)
GLOBULIN SER CALC-MCNC: 4 G/DL (ref 2–4)
GLUCOSE SERPL-MCNC: 115 MG/DL (ref 65–100)
GLUCOSE SERPL-MCNC: 97 MG/DL (ref 65–100)
HCT VFR BLD AUTO: 30.4 % (ref 35–47)
HGB BLD-MCNC: 10.1 G/DL (ref 11.5–16)
IMM GRANULOCYTES # BLD AUTO: 0 K/UL (ref 0–0.04)
IMM GRANULOCYTES NFR BLD AUTO: 0 % (ref 0–0.5)
LYMPHOCYTES # BLD: 2 K/UL (ref 0.8–3.5)
LYMPHOCYTES NFR BLD: 23 % (ref 12–49)
MCH RBC QN AUTO: 33 PG (ref 26–34)
MCHC RBC AUTO-ENTMCNC: 33.2 G/DL (ref 30–36.5)
MCV RBC AUTO: 99.3 FL (ref 80–99)
MONOCYTES # BLD: 0.5 K/UL (ref 0–1)
MONOCYTES NFR BLD: 6 % (ref 5–13)
NEUTS SEG # BLD: 6 K/UL (ref 1.8–8)
NEUTS SEG NFR BLD: 70 % (ref 32–75)
NRBC # BLD: 0 K/UL (ref 0–0.01)
NRBC BLD-RTO: 0 PER 100 WBC
PLATELET # BLD AUTO: 262 K/UL (ref 150–400)
PMV BLD AUTO: 11.1 FL (ref 8.9–12.9)
POTASSIUM SERPL-SCNC: 3.8 MMOL/L (ref 3.5–5.1)
POTASSIUM SERPL-SCNC: 4.1 MMOL/L (ref 3.5–5.1)
PROT SERPL-MCNC: 7.4 G/DL (ref 6.4–8.2)
RBC # BLD AUTO: 3.06 M/UL (ref 3.8–5.2)
SODIUM SERPL-SCNC: 129 MMOL/L (ref 136–145)
SODIUM SERPL-SCNC: 130 MMOL/L (ref 136–145)
WBC # BLD AUTO: 8.6 K/UL (ref 3.6–11)

## 2020-07-28 PROCEDURE — 74011250637 HC RX REV CODE- 250/637: Performed by: SURGERY

## 2020-07-28 PROCEDURE — 74011250637 HC RX REV CODE- 250/637: Performed by: INTERNAL MEDICINE

## 2020-07-28 PROCEDURE — 36415 COLL VENOUS BLD VENIPUNCTURE: CPT

## 2020-07-28 PROCEDURE — 74011250637 HC RX REV CODE- 250/637: Performed by: HOSPITALIST

## 2020-07-28 PROCEDURE — 85025 COMPLETE CBC W/AUTO DIFF WBC: CPT

## 2020-07-28 PROCEDURE — 80053 COMPREHEN METABOLIC PANEL: CPT

## 2020-07-28 RX ORDER — ACETAMINOPHEN 500 MG
500 TABLET ORAL
Status: SHIPPED | COMMUNITY
Start: 2020-07-28 | End: 2021-04-20 | Stop reason: ALTCHOICE

## 2020-07-28 RX ORDER — LISINOPRIL 20 MG/1
20 TABLET ORAL DAILY
Qty: 20 TAB | Refills: 0 | Status: SHIPPED | OUTPATIENT
Start: 2020-07-29 | End: 2021-04-20 | Stop reason: ALTCHOICE

## 2020-07-28 RX ORDER — OXYCODONE HYDROCHLORIDE 5 MG/1
5 TABLET ORAL
Qty: 6 TAB | Refills: 0 | Status: SHIPPED | OUTPATIENT
Start: 2020-07-28 | End: 2020-07-29 | Stop reason: ALTCHOICE

## 2020-07-28 RX ORDER — GUAIFENESIN 100 MG/5ML
81 LIQUID (ML) ORAL DAILY
Qty: 1 TAB | Refills: 0 | Status: SHIPPED | COMMUNITY
Start: 2020-07-28 | End: 2021-04-20 | Stop reason: ALTCHOICE

## 2020-07-28 RX ADMIN — ACETAMINOPHEN 650 MG: 325 TABLET ORAL at 08:29

## 2020-07-28 RX ADMIN — OXYCODONE 5 MG: 5 TABLET ORAL at 08:30

## 2020-07-28 RX ADMIN — OXYCODONE HYDROCHLORIDE AND ACETAMINOPHEN 1 TABLET: 5; 325 TABLET ORAL at 04:12

## 2020-07-28 NOTE — OP NOTES
[address]  OPERATIVE REPORT    Name:  Joe Green  MR#:  [id number]  :  1960  ACCOUNT #:  [de-identified]  DATE OF SERVICE:  2020      PREOPERATIVE DIAGNOSIS:  Biliary pancreatitis. POSTOPERATIVE DIAGNOSES:  1. Biliary pancreatitis. 2.  Chronic cholecystitis. PROCEDURE PERFORMED:  Laparoscopic cholecystectomy. SURGEON:  Hamilton Davidson MD    ASSISTANT:  Rylee Casas SA    ANESTHESIA:  General endotracheal.    COMPLICATIONS:  None. SPECIMENS REMOVED:  Gallbladder with contents. IMPLANTS:  None. ESTIMATED BLOOD LOSS:  125 mL. DRAIN:  None. COUNTS:  Sponge count correct. Needle count correct. INDICATIONS:  The patient is a 65-year-old [facility] American female with a history of schizoaffective disorder, who was admitted to [facility] with chest pain and abdominal pain. There was a concern for coronary artery disease and she also had elevated liver function tests with signs of biliary pancreatitis. Cardiac catheterization was performed which did not reveal obvious coronary artery disease. She underwent ERCP with stone extraction on 2020. Her liver function tests have improved, she was taken to the operating room today for laparoscopic cholecystectomy. FINDINGS:  1. Chronic cholecystitis with cholelithiasis. 2.  Dilated common bile duct and common hepatic duct. PROCEDURE:  The patient was identified as the correct patient in the preoperative holding area and informed consent was confirmed. After answering the patient's remaining questions, she was taken to the operating room and placed on the operating room table in the supine position. Sequential compression devices were placed on both lower extremities. Following the uneventful initiation of general anesthesia, she was carefully secured to the operating room table with footboard and safety strap in place. All potential pressure points were padded with eggcrate.   Her abdomen was prepped and draped in the usual sterile fashion. Final time-out was performed, and it was confirmed she had received intravenous antibiotics. Pneumoperitoneum was achieved using a closed Veress needle technique. Once adequate working sites had been developed, a 5-mm trocar was inserted through a small right-sided skin incision using an Optiview technique. After confirming intraperitoneal location of the trocar tip, insufflation with carbon dioxide gas was initiated. Once adequate working sites had been developed, a 5-mm, 30-degree laparoscope was inserted. No signs of trocar injury or Veress needle injury were present. Adhesions were present in the right upper quadrant and a small amount of serous ascites was present in the left and right subhepatic areas. A 12-mm trocar was inserted through a small periumbilical incision using visual guidance with the laparoscope. The patient was placed in a steep reverse Trendelenburg position. Two additional upper abdominal trocars were inserted through small incisions using visual guidance with the laparoscope. Pericolic and omental adhesions were freed from the edge of the liver, with identification of additional adhesions to the area of the gallbladder. These were taken down using a combination of blunt dissection and the bipolar device. This allowed the fundus to be identified and grasped with retraction oriented cephalad. Additional pericolic and omental adhesions were taken down using the bipolar device, freeing the infundibulum and allowed it to be grasped and retracted laterally. The gallbladder was noted be thickened, consistent with chronic cholecystitis. An extremely dilated common bile duct was visualized. Thickened peritoneal tissue was stripped away from the infundibulum in the direction of the portal structures. This allowed identification of the common hepatic duct and a dilated cystic duct.   Additional dissection in the area of the infundibulum and cystic duct were performed with subsequent takedown of adhesions between the duct itself and the infundibulum. This allowed development of a suitable segment of the cystic duct. The cystic artery was identified and circumferentially freed from surrounding tissue following dissection. Once the critical view of safety had been achieved, the cystic artery was doubly ligated between titanium hemoclips and then divided. The cystic duct was controlled with a tan load linear stapler firing. The gallbladder was dissected free from the gallbladder fossa of the liver using electrocautery and blunt dissection. Bleeding points within the hepatic parenchyma were controlled using electrocautery and the bipolar device. Once freed from the liver, the gallbladder was placed within a retrieval bag and removed from the patient's body. Once pneumoperitoneum had been reestablished, the right upper quadrant was irrigated with sterile saline. Small bleeding points within the hepatic parenchyma were controlled using Surgicel powder. After confirming adequate hemostasis, the 97-JT periumbilical fascial defect was closed with a 0 Vicryl suture using a laparoscopic suture passer. Pneumoperitoneum was released, and all trocars were removed. All wounds were infiltrated with 0.5% Marcaine without epinephrine. Skin edges were reapproximated with a combination of subcuticular 4-0 Monocryl suture and Dermabond. The patient tolerated the procedure well. She was extubated in the operating room and transported to the recovery area in stable condition. The attending surgeon, Dr. Garth Vera, was scrubbed and present for the entire procedure.         MD ANTOINETTE [id number]  D:  07/27/2020 14:31  T:  07/27/2020 22:29  JOB #:  [id number]

## 2020-07-28 NOTE — PROGRESS NOTES
General Surgery Daily Progress Note    Admit Date: 2020  Post-Operative Day: 1 Day Post-Op from Procedure(s):  LAPAROSCOPIC CHOLECYSTECTOMY     Subjective:     Last 24 hrs: Pt is doing well post cholecystectomy; tolerating diet, no n/v, voiding, OOB       Objective:     Blood pressure 169/76, pulse 72, temperature 99 °F (37.2 °C), resp. rate 16, height 5' 7\" (1.702 m), weight 145 lb 15.1 oz (66.2 kg), SpO2 99 %. Temp (24hrs), Av.1 °F (36.7 °C), Min:97 °F (36.1 °C), Max:99.9 °F (37.7 °C)      _____________________  Physical Exam:     Alert and Oriented, x3, in no acute distress. Cardiovascular: RRR, no peripheral edema  Abdomen: soft, nl BS, lap sites intact      Assessment:   Principal Problem:    Chest pain (2020)      Overview: Added automatically from request for surgery [id number]    Active Problems:    Gall stone pancreatitis (2020)      Elevated troponin (2020)            Plan:      May be discharged today  F/u w/  [facility] in 2 weeks    Data Review:    Recent Labs     20   WBC 8.6 5.9   HGB 10.1* 10.5*   HCT 30.4* 31.2*    233     Recent Labs     20  1004 20   * 129* 141 138   K 4.1 3.8 3.7 3.6   CL 98 93* 109* 107   CO2 25 28 25 25   * 97 117* 111*   BUN 5* 6 6 6   CREA 0.78 0.77 0.69 0.75   CA 8.9 8.9 9.1 8.9   ALB  --  3.4* 2.9* 3.2*   ALT  --  350* 347* 463*     Recent Labs     20   LPSE 156 152           ______________________  Medications:    Current Facility-Administered Medications   Medication Dose Route Frequency    sodium chloride (NS) flush 5-40 mL  5-40 mL IntraVENous Q8H    sodium chloride (NS) flush 5-40 mL  5-40 mL IntraVENous PRN    oxyCODONE-acetaminophen (PERCOCET) 5-325 mg per tablet 1 Tab  1 Tab Oral Q4H PRN    HYDROmorphone (PF) (DILAUDID) injection 1 mg  1 mg IntraVENous Q3H PRN    ondansetron (ZOFRAN) injection 4 mg  4 mg IntraVENous Q4H PRN    QUEtiapine (SEROquel) tablet 100 mg  100 mg Oral QHS    oxyCODONE IR (ROXICODONE) tablet 5 mg  5 mg Oral Q6H PRN    sodium chloride (NS) flush 5-40 mL  5-40 mL IntraVENous Q8H    sodium chloride (NS) flush 5-40 mL  5-40 mL IntraVENous PRN    acetaminophen (TYLENOL) tablet 650 mg  650 mg Oral Q4H PRN    lisinopriL (PRINIVIL, ZESTRIL) tablet 20 mg  20 mg Oral DAILY    sodium chloride (NS) flush 5-40 mL  5-40 mL IntraVENous Q8H    sodium chloride (NS) flush 5-40 mL  5-40 mL IntraVENous PRN       Zainab Tripathi NP  7/28/2020

## 2020-07-28 NOTE — PROGRESS NOTES
1955:  Bedside and Verbal shift change report given to BERTO Barkley (oncoming nurse) by JEREMÍAS Shirley, ALONSO (offgoing nurse). Report included the following information SBAR, Kardex, Intake/Output, MAR, Accordion and Recent Results. Pt states she would like something for pain at this time, but previous RN Feroz Ambrose, ALONSO), informed pt that it is too early and she can have pain medicine again after 2100. Pt verbalized understanding. 2015:  Pt continues to call out regarding pain medicine and has been informed that it has only been 15 mins since the nurses have told her that she can have pain medicine at 2100.     2030:  See above notes by Phillip Blum    2142: At bedside performing shift assessment, pt is adamant at refusing Seroquel and states that she doesn't want to explain to me again why she doesn't want to take it. Pt states that her throat is feeling better from the GI cocktail that was given to her. 2158:   Olga Goldsmith NP at bedside. 2230:  Pt called out asking for pain medicine, pt states pain is a 7 out of 10 right abdominal pain. Dilaudid 1mg given at this time.

## 2020-07-28 NOTE — PROGRESS NOTES
Bedside and Verbal shift change report given to [address] (oncoming nurse) by Ulises Prado (offgoing nurse). Report included the following information SBAR, Kardex, OR Summary, Procedure Summary, Intake/Output and MAR.     0900- patient refusing lisinopril, she says she does not want to take a medication that is going to start causing problems when she doesn't have any problems in the first place, I talked about her blood pressure being elevated she says that cant be true because she doesn't have high blood pressure problems, I explained how the medication could help if she took it as prescribed, patient is still refusing medication. 1330-I have reviewed discharge instructions with the patient. The patient verbalized understanding. Discharge medications reviewed with patient and appropriate educational materials and side effects teaching were provided. 1400- [employer] waiting outside to take patient home.

## 2020-07-28 NOTE — PROGRESS NOTES
T.O.C.:   Pt to d/c today   Transport needed   Transportation  # [phone]     arranged d/c transportation for pt.  in front of hospital at 2 pm. Ref # [id number].     Debbie Sterling RN

## 2020-07-28 NOTE — PROGRESS NOTES
8:26 PM: Pt calls out at this time and states she \"feels something\" in her throat and that \"the doctors told her this is something that could happen with her surgery. \"  8:27 PM:  Received call from [address] [place] [address] who reports that pt called them stating \"No one has come into my room. \" Informed [employer] that there was no legal emergency going on at this time and that we would check on the patient. Pt was in chair resting when I walked in and repeated the information in the above timestamped line. 8:30 PM: Pt states that she wants [place] medicine that is going to make me throw up so that I can get whatever is in my throat out. \" I assessed her oropharynx which yielded no evidence of obstruction. Pt's respirations are even and unlabored via a patent airway. Skin is warm and dry. No acute physiological distress noted. I informed the pt that we cannot give her a medication to induce vomiting; she states \"I know you have a medication that can do it, I have done my research. \" ALONSO Garcia at bedside at this time and briefed on the situation. ALONSO Garcia reiterated that we do not have a medication to induce vomiting here. Pt begins with aggressive tone and again demands this medication. Pt begins with behavior that is consistent with paranoia. She is with a significant PMHx of schizophrenia per her chart. Pt amenable to us contacting the provider and inquiring. 8:42 PM:   Spoke with Krissy Aguilar NP and briefed him on situation as above. Will give GI cocktail. 9:07 PM: I am at bedside administering PO GI Cocktail. I asked pt if I could administer her scheduled quetiapine. Alludes that she hasn't taken her quetiapine for some time. She states, \"the people that put me on that medicine when I was in my 35s in order to worsen my IBS on purpose. \" Pt not amenable to discussing the quetiapine any further. 9:58 PM:  ALMA ROSA Aguilar at bedside at this time.

## 2020-07-29 ENCOUNTER — TELEPHONE (OUTPATIENT)
Dept: SURGERY | Age: 60
End: 2020-07-29

## 2020-07-29 DIAGNOSIS — G89.18 POSTOPERATIVE PAIN: Primary | ICD-10-CM

## 2020-07-29 RX ORDER — TRAMADOL HYDROCHLORIDE 50 MG/1
50 TABLET ORAL
Qty: 12 TAB | Refills: 0 | Status: SHIPPED | OUTPATIENT
Start: 2020-07-29 | End: 2020-08-01

## 2020-07-29 NOTE — TELEPHONE ENCOUNTER
Patient called stating she wanted her oxycodone prescription sent to [facility] in [address] [place] [address]. I stated to the patient that the nurse has returned her call and the voicemail hasn't been set up. Patient stated she didn't need to set up her voicemail and that she answers the phone when someone calls her.

## 2020-07-29 NOTE — TELEPHONE ENCOUNTER
Patient called and would like to speak with nurse or Dr Ciera Dudley directly about changing a medication that has been prescribed. She would like a call back as soon as possible so she can get this corrected with the pharmacy.

## 2020-07-29 NOTE — TELEPHONE ENCOUNTER
Ms. Madelin Bishop is a patient who had emergency lap sushma by Dr. WITT [facility] 7/27/20 and was discharged yesterday 07/29/20. She says she doesn't want to take the Oxycodine prescribed and would like something other than an opioid called into [facility] for pain. Please call her.

## 2020-07-29 NOTE — DISCHARGE SUMMARY
Discharge Summary       PATIENT ID: Leopoldo Deans  MRN: [id number]   YOB: 1960    DATE OF ADMISSION: 7/22/2020  7:30 PM    DATE OF DISCHARGE: 7//28/2020   PRIMARY CARE PROVIDER: Emely Higginbotham     ATTENDING PHYSICIAN: Adele Barajas MD    DISCHARGING PROVIDER: Hola De Leon MD    To contact this individual call [phone] and ask the  to page. If unavailable ask to be transferred the Adult Hospitalist Department. CONSULTATIONS: IP CONSULT TO GASTROENTEROLOGY  IP CONSULT TO CARDIOLOGY  IP CONSULT TO GENERAL SURGERY    PROCEDURES/SURGERIES: Procedure(s):  [facility] COURSE:     DISCHARGE DIAGNOSES / PLAN:    65-year-old female with a past medical history of schizoaffective disorder, who presents to the hospital with abdominal pain. # Gall stone pancreatitis:improved  #Transminitis and hyperbilirubinemia-improving  MRCP showed  Cholelithiasis,there is intra and extrahepatic ductal dilatation. Pancreatic duct is dilatedto 6 mm. There is choledocholithiasis with a 0.7 cm calculus at the ampulla and a 1.3 cm stone in the distal common bile duct  -GI following  -s/p ERCP  -ERCP with biliary sphincterotomy, biliary stone removal, biliary stent placement. OP follow up for stent removal.  - prn roxycodone  -tolerated diet   -s/p  laparoscopic cholecystectomy  LFTs trending down        #Elevated troponin -Type 2 MI:  -cardiac cath showed trivial CAD  -Aspirin was started at discharge,statin to be started as OP once LFTs improve     #HTN  -on lisinopril.     Hypokalemia:  repleted     Moderate mitral regurgitation:  -cardiology follow up     Schizophrenia:  -per MAR,patient refused to take celexa and seroquel while in hospital-unclear compliance as OP.     #Mild hyponatremia:  Patient asymptomatic,improving with IVF      After obtaining permission from patient,I discussed with patient's sister and her  about the discharge instructions and the necessary follow up/meds. I called patient's  who arranges appointments  for patient and updated about the need for repeat ERCP and stent removal in 6 weeks,gave details of gastro office. Fuentesradha Malenaj carlos called [facility] gastroenterology office, gave patient details to set up an appointment for patient for office follow up. Mri Abd W Mrcp W Wo Cont    Result Date: 7/23/2020  *PRELIMINARY REPORT* Choledocholithiasis with 7 mm stone at the ampulla, 1.3 cm and in the common bile duct, 13 mm dilation of the common bile duct, and 6 mm dilation of the pancreatic duct. Multiple stones seen within the gallbladder with intrahepatic biliary dilation. Preliminary report was provided by  [employer], the on-call radiologist, at 04:57 Final report to follow. *END PRELIMINARY REPORTEXAM:  MRI ABD W MRCP W WO CONT INDICATION:   galsstone pancreatitis COMPARISON: None. CONTRAST:  7 mL Gadavist. TECHNIQUE: MR of the abdomen was performed and the following sequences were obtained: Coronal HASTE, multiplanar MRCP, axial in and out-of-phase T1, axial T1 fat-saturated, axial T2, and pre- and post contrast T1-weighted images. FINDINGS: Study is significantly compromised by motion. There is a small hiatal hernia. No focal lesions are identified in the liver. .  There are multiple gallstones. . The spleen is normal.  No focal lesions are identified in the pancreas. The adrenal glands are normal.  The kidneys are normal. There is intrahepatic ductal dilatation. Common duct measures 13 mm. There is a 1.3 cm calculus in the mid to distal common bile duct. There is a 0.7 cm calculus at the ampulla. Pancreatic duct is dilated to 6 mm. There is no pancreas divisum. There is no lymphadenopathy or ascites. IMPRESSION:  1. Study is significantly compromised by motion. 2. Cholelithiasis. 3. There is intra and extrahepatic ductal dilatation. Pancreatic duct is dilated to 6 mm.  There is choledocholithiasis with a 0.7 cm calculus at the ampulla and a 1.3 cm stone in the distal common bile duct. FINAL PATHOLOGIC DIAGNOSIS   Gallbladder, cholecystectomy:   Chronic cholecystitis   Cholelithiasis     PENDING TEST RESULTS:   At the time of discharge the following test results are still pending: none    FOLLOW UP APPOINTMENTS:    Follow-up Information     Follow up With Specialties Details Why Contact Info    Cesar Crowe MD General Surgery, Bariatrics, Breast Surgery Schedule an appointment as soon as possible for a visit in 2 weeks For wound re-check [address]  [phone]      UNKNOWN                 ADDITIONAL CARE RECOMMENDATIONS:   -Take tylenol for pain, use oxycodone only for severe pain. Oxycodone can cause drowsiness,decreases breathing rate,constipation.   -Take sennakot or miralax if you have constipation.  -We prescribed lisinopril to control BP  -Follow up with PCP and general surgery in 1 week  -Repeat CBC, CMP in 1 week  -Start lipitor when liver function test normalizes        DIET: Regular Diet      ACTIVITY: Activity as tolerated    WOUND CARE: na    EQUIPMENT needed: na    DISCHARGE MEDICATIONS:  Discharge Medication List as of 7/28/2020  1:49 PM      START taking these medications    Details   oxyCODONE IR (ROXICODONE) 5 mg immediate release tablet Take 1 Tab by mouth every eight (8) hours as needed for Pain for up to 3 days. Max Daily Amount: 15 mg., Normal, Disp-6 Tab,R-0      lisinopriL (PRINIVIL, ZESTRIL) 20 mg tablet Take 1 Tab by mouth daily. , Normal, Disp-20 Tab,R-0         CONTINUE these medications which have NOT CHANGED    Details   aspirin 81 mg chewable tablet Take 1 Tab by mouth daily. , OTC, Disp-1 Tab,R-0      !! QUEtiapine (SEROqueL) 100 mg tablet Take 200 mg by mouth every evening. Takes 100mg PO in morning and 200mg in evening, Historical Med      citalopram (CELEXA) 40 mg tablet Take 40 mg by mouth daily. , Historical Med      !! QUEtiapine (SEROqueL) 100 mg tablet Take 100 mg by mouth every morning. Takes 100mg PO in morning and 200mg in evening, Historical Med       !! - Potential duplicate medications found. Please discuss with provider. NOTIFY YOUR PHYSICIAN FOR ANY OF THE FOLLOWING:   Fever over 101 degrees for 24 hours. Chest pain, shortness of breath, fever, chills, nausea, vomiting, diarrhea, change in mentation, falling, weakness, bleeding. Severe pain or pain not relieved by medications. Or, any other signs or symptoms that you may have questions about. DISPOSITION:   x Home With:   OT  PT  HH  RN       Long term SNF/Inpatient Rehab    Independent/assisted living    Hospice    Other:       PATIENT CONDITION AT DISCHARGE:     Functional status    Poor     Deconditioned    x Independent      Cognition   x  Lucid     Forgetful     Dementia      Catheters/lines (plus indication)    Delacruz     PICC     PEG    x None      Code status   x  Full code     DNR      PHYSICAL EXAMINATION AT DISCHARGE:                                           Constitutional:  No acute distress, cooperative, pleasant    ENT:  Oral mucosa moist, oropharynx benign. Resp:  clear to auscultation b/l, no wheezing. CV:  Regular rhythm, normal rate, S1,S2 wnl    GI:  Soft, non distended, healing scars of surgery,non tender, normoactive bowel sounds    Musculoskeletal: No LE edema    Neurologic:  Moves all extremities. AAOx3                         Psych:   Not anxious nor agitated.   Skin:  no rashes or ulcers      CHRONIC MEDICAL DIAGNOSES:  Problem List as of 7/28/2020 Date Reviewed: 7/27/2020          Codes Class Noted - Resolved    Elevated troponin ICD-10-CM: R79.89  ICD-9-CM: 790.6  7/24/2020 - Present        Gall stone pancreatitis ICD-10-CM: K85.10  ICD-9-CM: 482.7, 574.20  7/22/2020 - Present        * (Principal) Chest pain ICD-10-CM: R07.9  ICD-9-CM: 786.50  7/22/2020 - Present    Overview Signed 7/23/2020 10:31 AM by Niya Singh MD     Added automatically from request for surgery [id number]                   30 minutes were spent with the patient on counseling and coordination of care    Signed:   Stanton Barker MD  7/29/2020  6:12 AM    CC: Juan Antonio Ornelas

## 2020-07-29 NOTE — TELEPHONE ENCOUNTER
I finally reached the patient and I let her know that this was my 4th time trying to reach her and she said she must of been on the phone she said she does not want to take the Oxycodone that has been prescribed to her, she said she has read about it and knows it is habit forming and she has read the side effects and she said she already has some of these and she know that there is something else we can order for her. I told her all pain medications are Opioids and she said she know we can give her something that is not as strong as this. I told her I will forward this call to the NP so she can order her something different. Pt in agreement.

## 2020-07-29 NOTE — TELEPHONE ENCOUNTER
I called the patient back and again the recording said \"voice mail box has not been set up. Will try again later. See previous telephone call encounter.

## 2020-07-30 NOTE — DISCHARGE INSTRUCTIONS
Patient Education   Call [phone] to schedule Surgery follow-up appointment for 2 weeks after hospital discharge     Cholecystectomy: What to Expect at Home  Your Recovery  After your surgery, it is normal to feel weak and tired for several days after you return home. Your belly may be swollen. If you had laparoscopic surgery, you may also have pain in your shoulder for about 24 hours. You may have gas or need to burp a lot at first, and a few people get diarrhea. The diarrhea usually goes away in 2 to 4 weeks, but it may last longer. How quickly you recover depends on whether you had a laparoscopic or open surgery. · For a laparoscopic surgery, most people can go back to work or their normal routine in 1 to 2 weeks, but it may take longer, depending on the type of work you do. · For an open surgery, it will probably take 4 to 6 weeks before you get back to your normal routine. This care sheet gives you a general idea about how long it will take for you to recover. However, each person recovers at a different pace. Follow the steps below to get better as quickly as possible. How can you care for yourself at home? Activity  · Rest when you feel tired. Getting enough sleep will help you recover. · Try to walk each day. Start out by walking a little more than you did the day before. Gradually increase the amount you walk. Walking boosts blood flow and helps prevent pneumonia and constipation. · For about 2 to 4 weeks, avoid lifting anything that would make you strain. This may include a child, heavy grocery bags and milk containers, a heavy briefcase or backpack, cat litter or dog food bags, or a vacuum . · Avoid strenuous activities, such as biking, jogging, weightlifting, and aerobic exercise, until your doctor says it is okay. · You may shower 24 hours after surgery. Pat the cuts (incisions) dry. Do not take a bath for the first 2 weeks, or until your doctor tells you it is okay.   · You may drive when you are no longer taking pain medicine and can quickly move your foot from the gas pedal to the brake. You must also be able to sit comfortably for a long period of time, even if you do not plan to go far. You might get caught in traffic. · For a laparoscopic surgery, most people can go back to work or their normal routine in 1 to 2 weeks, but it may take longer. For an open surgery, it will probably take 4 to 6 weeks before you get back to your normal routine. · Your doctor will tell you when you can have sex again. Diet  · Eat smaller meals more often instead of fewer larger meals. You can eat a normal diet, but avoid eating fatty foods for about 1 month. Fatty foods include hamburger, whole milk, cheese, and many snack foods. If your stomach is upset, try bland, low-fat foods like plain rice, broiled chicken, toast, and yogurt. · Drink plenty of fluids (unless your doctor tells you not to). · If you have diarrhea, try avoiding spicy foods, dairy products, fatty foods, and alcohol. You can also watch to see if specific foods cause it, and stop eating them. If the diarrhea continues for more than 2 weeks, talk to your doctor. · You may notice that your bowel movements are not regular right after your surgery. This is common. Try to avoid constipation and straining with bowel movements. You may want to take a fiber supplement every day. If you have not had a bowel movement after a couple of days, ask your doctor about taking a mild laxative. Medicines  · Your doctor will tell you if and when you can restart your medicines. He or she will also give you instructions about taking any new medicines. · If you take aspirin or some other blood thinner, ask your doctor if and when to start taking it again. Make sure that you understand exactly what your doctor wants you to do. · Take pain medicines exactly as directed. ? If the doctor gave you a prescription medicine for pain, take it as prescribed.   ? If you are not taking a prescription pain medicine, take an over-the-counter medicine such as acetaminophen (Tylenol), ibuprofen (Advil, Motrin), or naproxen (Aleve). Read and follow all instructions on the label. ? Do not take two or more pain medicines at the same time unless the doctor told you to. Many pain medicines contain acetaminophen, which is Tylenol. Too much Tylenol can be harmful. · If you think your pain medicine is making you sick to your stomach:  ? Take your medicine after meals (unless your doctor tells you not to). ? Ask your doctor for a different pain medicine. · If your doctor prescribed antibiotics, take them as directed. Do not stop taking them just because you feel better. You need to take the full course of antibiotics. Incision care  · If you have strips of tape on the incision, or cut, leave the tape on for a week or until it falls off. · After 24 to 48 hours, wash the area daily with warm, soapy water, and pat it dry. · You may have staples to hold the cut together. Keep them dry until your doctor takes them out. This is usually in 7 to 10 days. · Keep the area clean and dry. You may cover it with a gauze bandage if it weeps or rubs against clothing. Change the bandage every day. Ice  · To reduce swelling and pain, put ice or a cold pack on your belly for 10 to 20 minutes at a time. Do this every 1 to 2 hours. Put a thin cloth between the ice and your skin. Follow-up care is a key part of your treatment and safety. Be sure to make and go to all appointments, and call your doctor if you are having problems. It's also a good idea to know your test results and keep a list of the medicines you take. When should you call for help? [id number] anytime you think you may need emergency care. For example, call if:  · You passed out (lost consciousness). · You are short of breath. .  Call your doctor now or seek immediate medical care if:  · You are sick to your stomach and cannot drink fluids. · You have pain that does not get better when you take your pain medicine. · You cannot pass stools or gas. · You have signs of infection, such as:  ? Increased pain, swelling, warmth, or redness. ? Red streaks leading from the incision. ? Pus draining from the incision. ? A fever. · Bright red blood has soaked through the bandage over your incision. · You have loose stitches, or your incision comes open. · You have signs of a blood clot in your leg (called a deep vein thrombosis), such as:  ? Pain in your calf, back of knee, thigh, or groin. ? Redness and swelling in your leg or groin. Watch closely for any changes in your health, and be sure to contact your doctor if you have any problems. Where can you learn more? Go to http://kai-kathia.info/  Enter F357 in the search box to learn more about \"Cholecystectomy: What to Expect at Home. \"  Current as of: August 12, 2019               Content Version: 12.5  © 8202-2440 [employer]. Care instructions adapted under license by [employer] (which disclaims liability or warranty for this information). If you have questions about a medical condition or this instruction, always ask your healthcare professional. [place] [address] any warranty or liability for your use of this information. Discharge Instructions       PATIENT ID: Maxine Avila  MRN: [id number]   YOB: 1960    DATE OF ADMISSION: 7/22/2020  7:30 PM    DATE OF DISCHARGE: 7/28/2020    PRIMARY CARE PROVIDER: UNKNOWN     ATTENDING PHYSICIAN: Frantz Valle MD  DISCHARGING PROVIDER: Elio Felix MD    To contact this individual call [phone] and ask the  to page. If unavailable ask to be transferred the Adult Hospitalist Department.     DISCHARGE DIAGNOSES ACUTE GALL STONE PANCREATITIS    CONSULTATIONS: IP CONSULT TO GASTROENTEROLOGY  IP CONSULT TO CARDIOLOGY  IP CONSULT TO GENERAL SURGERY    PROCEDURES/SURGERIES: Procedure(s):  LAPAROSCOPIC CHOLECYSTECTOMY    PENDING TEST RESULTS:   At the time of discharge the following test results are still pending: Gall bladder biopsy    FOLLOW UP APPOINTMENTS:   Follow-up Information     Follow up With Specialties Details Why Contact Info    Dennie Creed, MD General Surgery, Bariatrics, Breast Surgery Schedule an appointment as soon as possible for a visit in 2 weeks For wound re-check [address] [facility]  [address]  [phone]      UNKNOWN               ADDITIONAL CARE RECOMMENDATIONS:   -Take tylenol for pain, use oxycodone only for severe pain. Oxycodone can cause drowsiness,decreases breathing rate,constipation.   -Take sennakot or miralax if you have constipation.  -We prescribed lisinopril to control BP  -Follow up with PCP and general surgery in 1 week  -Repeat CBC, CMP in 1 week  -Start lipitor when liver function test normalizes        DIET: Regular Diet      ACTIVITY: Activity as tolerated    WOUND CARE: na    EQUIPMENT needed: na      DISCHARGE MEDICATIONS:   See Medication Reconciliation Form    · It is important that you take the medication exactly as they are prescribed. · Keep your medication in the bottles provided by the pharmacist and keep a list of the medication names, dosages, and times to be taken in your wallet. · Do not take other medications without consulting your doctor. NOTIFY YOUR PHYSICIAN FOR ANY OF THE FOLLOWING:   Fever over 101 degrees for 24 hours. Chest pain, shortness of breath, fever, chills, nausea, vomiting, diarrhea, change in mentation, falling, weakness, bleeding. Severe pain or pain not relieved by medications. Or, any other signs or symptoms that you may have questions about.       DISPOSITION:  X  Home With:   OT  PT  [facility]  RN       SNF/Inpatient Rehab/LTAC    Independent/assisted living    Hospice    Other:         Signed:   Simon Garcia MD  7/28/2020  1:47 PM

## 2020-08-11 ENCOUNTER — OFFICE VISIT (OUTPATIENT)
Dept: SURGERY | Age: 60
End: 2020-08-11
Payer: MEDICAID

## 2020-08-11 VITALS
SYSTOLIC BLOOD PRESSURE: 135 MMHG | BODY MASS INDEX: 24.67 KG/M2 | OXYGEN SATURATION: 98 % | RESPIRATION RATE: 17 BRPM | HEART RATE: 63 BPM | TEMPERATURE: 97.6 F | WEIGHT: 157.2 LBS | HEIGHT: 67 IN | DIASTOLIC BLOOD PRESSURE: 72 MMHG

## 2020-08-11 DIAGNOSIS — Z09 POSTOPERATIVE EXAMINATION: Primary | ICD-10-CM

## 2020-08-11 PROCEDURE — 99024 POSTOP FOLLOW-UP VISIT: CPT | Performed by: NURSE PRACTITIONER

## 2020-08-11 NOTE — PROGRESS NOTES
1. Have you been to the ER, urgent care clinic since your last visit? Hospitalized since your last visit? No    2. Have you seen or consulted any other health care providers outside of the [address] [place] [address] since your last visit? Include any pap smears or colon screening.  No

## 2020-08-11 NOTE — PROGRESS NOTES
Subjective:      Dayanna Barber is a 64 y.o. female presents for postop care 2 weeks status post Laparoscopic Cholecystectomy. Appetite is good. Eating a regular diet without difficulty. Bowel movements are regular. The patient is voiding without difficulty. The patient is not having any pain. .        Ms. Anuj Pierre has a reminder for a \"due or due soon\" health maintenance. I have asked that she contact her primary care provider for follow-up on this health maintenance. Objective:     Visit Vitals  /72 (BP 1 Location: Left arm, BP Patient Position: Sitting)   Pulse 63   Temp 97.6 °F (36.4 °C) (Oral)   Resp 17   Ht 5' 7\" (1.702 m)   Wt 157 lb 3.2 oz (71.3 kg)   LMP 11/25/2013   SpO2 98%   BMI 24.62 kg/m²       General:  alert, cooperative, no distress   Abdomen: soft, bowel sounds active, non-tender   Incision:   healing well, no drainage, no erythema, no seroma, no swelling, no dehiscence, incision well approximated     Assessment:     Doing well postoperatively. Plan:     1. Follow up as needed. 2. Path reviewed with patient. 3. May increase activity as tolerated. Pt verbalized understanding and questions were answered to the best of my knowledge and ability.

## 2021-04-20 ENCOUNTER — OFFICE VISIT (OUTPATIENT)
Dept: PRIMARY CARE CLINIC | Age: 61
End: 2021-04-20
Payer: MEDICAID

## 2021-04-20 VITALS
HEIGHT: 67 IN | TEMPERATURE: 97.4 F | DIASTOLIC BLOOD PRESSURE: 79 MMHG | BODY MASS INDEX: 27.98 KG/M2 | HEART RATE: 68 BPM | SYSTOLIC BLOOD PRESSURE: 137 MMHG | OXYGEN SATURATION: 99 % | RESPIRATION RATE: 20 BRPM | WEIGHT: 178.25 LBS

## 2021-04-20 DIAGNOSIS — K85.10 GALL STONE PANCREATITIS: ICD-10-CM

## 2021-04-20 DIAGNOSIS — Z00.00 ADULT GENERAL MEDICAL EXAM: Primary | ICD-10-CM

## 2021-04-20 DIAGNOSIS — Z12.31 ENCOUNTER FOR SCREENING MAMMOGRAM FOR MALIGNANT NEOPLASM OF BREAST: ICD-10-CM

## 2021-04-20 DIAGNOSIS — F20.9 SCHIZOPHRENIA, UNSPECIFIED TYPE (HCC): ICD-10-CM

## 2021-04-20 PROBLEM — R07.9 CHEST PAIN: Status: RESOLVED | Noted: 2020-07-22 | Resolved: 2021-04-20

## 2021-04-20 PROBLEM — R77.8 ELEVATED TROPONIN: Status: RESOLVED | Noted: 2020-07-24 | Resolved: 2021-04-20

## 2021-04-20 PROCEDURE — 99386 PREV VISIT NEW AGE 40-64: CPT | Performed by: FAMILY MEDICINE

## 2021-04-20 NOTE — PROGRESS NOTES
Chief Complaint   Patient presents with   Sophia Luo Annual Wellness Visit     Patient states she is here for general physical wellness and she does not want anything invasive done.  Labs     1. Have you been to the ER, urgent care clinic since your last visit? Hospitalized since your last visit? No    2. Have you seen or consulted any other health care providers outside of the [address] [place] [address] since your last visit? Include any pap smears or colon screening. No     Patient states she wants a physical but nothing invasive. Roslyn Fry (: 1960) is a 64 y.o. female, new patient, here for evaluation of the following chief complaint(s): Annual Wellness Visit (Patient states she is here for general physical wellness and she does not want anything invasive done.) and Labs       ASSESSMENT/PLAN:  1. Adult general medical exam  -     CBC WITH AUTOMATED DIFF  -     METABOLIC PANEL, COMPREHENSIVE  -     LIPID PANEL  -     URINALYSIS W/ RFLX MICROSCOPIC  2. Gall stone pancreatitis  3. [facility], [facility]  4. Encounter for screening mammogram for malignant neoplasm of breast  -     YARITZA MAMMO BI SCREENING INCL CAD; Future      Return in about 6 months (around 10/20/2021) for Follow up of chronic medical conditions, Fasting Lab Appointment. SUBJECTIVE/OBJECTIVE:  This patient comes in for an adult health physical.  She agrees to lab work and to get a mammogram but does not want any internal exams. Looking back in the records this appears to be the first visit for this patient to us but she insists that she has been here in the past.  She does have schizophrenia and is taking care of by mental health. She recently underwent a cholecystectomy for gallbladder pancreatitis and had an elevated troponin so she underwent a cardiac catheterization which only showed trivial CAD per the cardiologist report. I do not have the specifics.   She otherwise seems to be doing well      Review of Systems   Constitutional: Negative. Respiratory: Negative. Cardiovascular: Negative. Gastrointestinal: Positive for abdominal pain and constipation. Endocrine: Negative. Genitourinary: Negative. Musculoskeletal: Negative. Allergic/Immunologic: Negative. Neurological: Negative. Hematological: Negative. Psychiatric/Behavioral: Negative. Physical Exam  Vitals signs and nursing note reviewed. Constitutional:       Appearance: Normal appearance. She is normal weight. HENT:      Head: Normocephalic and atraumatic. Cardiovascular:      Rate and Rhythm: Normal rate and regular rhythm. Heart sounds: Normal heart sounds. Pulmonary:      Effort: Pulmonary effort is normal.      Breath sounds: Normal breath sounds. Abdominal:      General: Abdomen is flat. Bowel sounds are normal.      Palpations: Abdomen is soft. Musculoskeletal: Normal range of motion. Neurological:      General: No focal deficit present. Mental Status: She is alert. Psychiatric:         Attention and Perception: Attention normal.         Mood and Affect: Mood normal.         Speech: Speech normal.         Behavior: Behavior normal.         Fortunately I do have the records from when she had the cholecystectomy which gives some more history than the patient is able to get up today. She seems to be well controlled on her mental health medications and I will not address these today. Discussed recommendations for a routine well woman exam and she only will agree to a mammogram.  She does agree to get lab work to see how she is doing. An electronic signature was used to authenticate this note.   -- Buck Cerna MD

## 2021-04-21 LAB
ALBUMIN SERPL-MCNC: 4.8 G/DL (ref 3.8–4.9)
ALBUMIN/GLOB SERPL: 1.7 {RATIO} (ref 1.2–2.2)
ALP SERPL-CCNC: 110 IU/L (ref 39–117)
ALT SERPL-CCNC: 17 IU/L (ref 0–32)
APPEARANCE UR: CLEAR
AST SERPL-CCNC: 22 IU/L (ref 0–40)
BACTERIA #/AREA URNS HPF: NORMAL /[HPF]
BASOPHILS # BLD AUTO: 0 X10E3/UL (ref 0–0.2)
BASOPHILS NFR BLD AUTO: 1 %
BILIRUB SERPL-MCNC: 0.2 MG/DL (ref 0–1.2)
BILIRUB UR QL STRIP: NEGATIVE
BUN SERPL-MCNC: 10 MG/DL (ref 6–24)
BUN/CREAT SERPL: 12 (ref 9–23)
CALCIUM SERPL-MCNC: 9.9 MG/DL (ref 8.7–10.2)
CASTS URNS QL MICRO: NORMAL /LPF
CHLORIDE SERPL-SCNC: 105 MMOL/L (ref 96–106)
CHOLEST SERPL-MCNC: 182 MG/DL (ref 100–199)
CO2 SERPL-SCNC: 24 MMOL/L (ref 20–29)
COLOR UR: YELLOW
CREAT SERPL-MCNC: 0.81 MG/DL (ref 0.57–1)
EOSINOPHIL # BLD AUTO: 0.5 X10E3/UL (ref 0–0.4)
EOSINOPHIL NFR BLD AUTO: 10 %
EPI CELLS #/AREA URNS HPF: NORMAL /HPF (ref 0–10)
ERYTHROCYTE [DISTWIDTH] IN BLOOD BY AUTOMATED COUNT: 13 % (ref 11.7–15.4)
GLOBULIN SER CALC-MCNC: 2.8 G/DL (ref 1.5–4.5)
GLUCOSE SERPL-MCNC: 97 MG/DL (ref 65–99)
GLUCOSE UR QL: NEGATIVE
HCT VFR BLD AUTO: 34.9 % (ref 34–46.6)
HDLC SERPL-MCNC: 85 MG/DL
HGB BLD-MCNC: 11.9 G/DL (ref 11.1–15.9)
HGB UR QL STRIP: NEGATIVE
IMM GRANULOCYTES # BLD AUTO: 0 X10E3/UL (ref 0–0.1)
IMM GRANULOCYTES NFR BLD AUTO: 0 %
KETONES UR QL STRIP: NEGATIVE
LDLC SERPL CALC-MCNC: 85 MG/DL (ref 0–99)
LEUKOCYTE ESTERASE UR QL STRIP: ABNORMAL
LYMPHOCYTES # BLD AUTO: 2.3 X10E3/UL (ref 0.7–3.1)
LYMPHOCYTES NFR BLD AUTO: 41 %
MCH RBC QN AUTO: 33.6 PG (ref 26.6–33)
MCHC RBC AUTO-ENTMCNC: 34.1 G/DL (ref 31.5–35.7)
MCV RBC AUTO: 99 FL (ref 79–97)
MICRO URNS: ABNORMAL
MONOCYTES # BLD AUTO: 0.4 X10E3/UL (ref 0.1–0.9)
MONOCYTES NFR BLD AUTO: 7 %
NEUTROPHILS # BLD AUTO: 2.2 X10E3/UL (ref 1.4–7)
NEUTROPHILS NFR BLD AUTO: 41 %
NITRITE UR QL STRIP: NEGATIVE
PH UR STRIP: 5.5 [PH] (ref 5–7.5)
PLATELET # BLD AUTO: 220 X10E3/UL (ref 150–450)
POTASSIUM SERPL-SCNC: 4.1 MMOL/L (ref 3.5–5.2)
PROT SERPL-MCNC: 7.6 G/DL (ref 6–8.5)
PROT UR QL STRIP: NEGATIVE
RBC # BLD AUTO: 3.54 X10E6/UL (ref 3.77–5.28)
RBC #/AREA URNS HPF: NORMAL /HPF (ref 0–2)
SODIUM SERPL-SCNC: 144 MMOL/L (ref 134–144)
SP GR UR: 1.01 (ref 1–1.03)
TRIGL SERPL-MCNC: 62 MG/DL (ref 0–149)
UROBILINOGEN UR STRIP-MCNC: 0.2 MG/DL (ref 0.2–1)
VLDLC SERPL CALC-MCNC: 12 MG/DL (ref 5–40)
WBC # BLD AUTO: 5.5 X10E3/UL (ref 3.4–10.8)
WBC #/AREA URNS HPF: NORMAL /HPF (ref 0–5)

## 2021-04-22 NOTE — PATIENT INSTRUCTIONS
Well Visit, Women 48 to 72: Care Instructions Overview Well visits can help you stay healthy. Your doctor has checked your overall health and may have suggested ways to take good care of yourself. Your doctor also may have recommended tests. At home, you can help prevent illness with healthy eating, regular exercise, and other steps. Follow-up care is a key part of your treatment and safety. Be sure to make and go to all appointments, and call your doctor if you are having problems. It's also a good idea to know your test results and keep a list of the medicines you take. How can you care for yourself at home? · Get screening tests that you and your doctor decide on. Screening helps find diseases before any symptoms appear. · Eat healthy foods. Choose fruits, vegetables, whole grains, protein, and low-fat dairy foods. Limit fat, especially saturated fat. Reduce salt in your diet. · Limit alcohol. Have no more than 1 drink a day or 7 drinks a week. · Get at least 30 minutes of exercise on most days of the week. Walking is a good choice. You also may want to do other activities, such as running, swimming, cycling, or playing tennis or team sports. · Reach and stay at a healthy weight. This will lower your risk for many problems, such as obesity, diabetes, heart disease, and high blood pressure. · Do not smoke. Smoking can make health problems worse. If you need help quitting, talk to your doctor about stop-smoking programs and medicines. These can increase your chances of quitting for good. · Care for your mental health. It is easy to get weighed down by worry and stress. Learn strategies to manage stress, like deep breathing and mindfulness, and stay connected with your family and community. If you find you often feel sad or hopeless, talk with your doctor. Treatment can help. · Talk to your doctor about whether you have any risk factors for sexually transmitted infections (STIs).  You can help prevent STIs if you wait to have sex with a new partner (or partners) until you've each been tested for STIs. It also helps if you use condoms (male or female condoms) and if you limit your sex partners to one person who only has sex with you. Vaccines are available for some STIs. · If you think you may have a problem with alcohol or drug use, talk to your doctor. This includes prescription medicines (such as amphetamines and opioids) and illegal drugs (such as cocaine and methamphetamine). Your doctor can help you figure out what type of treatment is best for you. · Protect your skin from too much sun. When you're outdoors from 10 a.m. to 4 p.m., stay in the shade or cover up with clothing and a hat with a wide brim. Wear sunglasses that block UV rays. Even when it's cloudy, put broad-spectrum sunscreen (SPF 30 or higher) on any exposed skin. · See a dentist one or two times a year for checkups and to have your teeth cleaned. · Wear a seat belt in the car. When should you call for help? Watch closely for changes in your health, and be sure to contact your doctor if you have any problems or symptoms that concern you. Where can you learn more? Go to http://www.gray.com/ Enter C643 in the search box to learn more about \"Well Visit, Women 50 to 72: Care Instructions. \" Current as of: May 27, 2020               Content Version: 12.8 © 1035-2819 [employer]. Care instructions adapted under license by [employer] (which disclaims liability or warranty for this information). If you have questions about a medical condition or this instruction, always ask your healthcare professional. [place] [address] any warranty or liability for your use of this information. Learning About Breast Cancer Screening What is breast cancer screening? Breast cancer occurs when cells that are not normal grow in one or both of your breasts.  Screening tests can help find breast cancer early. Cancer is easier to treat when it's found early. Having concerns about breast cancer is common. That's why it's important to talk with your doctor about when to start and how often to get screened for breast cancer. How is breast cancer screening done? Several screening tests can be used to check for breast cancer. Mammograms. These tests check for signs of cancer using X-rays. They can show tumors that are too small for you or your doctor to feel. During a mammogram, a machine squeezes your breasts to make them flatter and easier to X-ray. At least two pictures are taken of each breast. One is taken from the top and one from the side. 3-D mammograms. These tests are also called digital breast tomosynthesis. Your breast is positioned on a flat plate. A top plate is pressed against your breast to keep it in position. The X-ray arm then moves in an arc above the breast and takes many pictures. A computer uses these X-rays to create a three-dimensional image. Clinical breast exam.  
In this exam, your doctor carefully feels your breasts and under your arms to check for lumps or other changes. Who should be screened for breast cancer? Experts agree that mammograms are the best screening test for people at average risk of breast cancer. But they don't all agree on the age at which screening should start. And they don't agree on whether it's better to be screened every year or every two years. Here are some of the recommendations from experts: 
· Start by age 36 and have a mammogram each year. · Start at age 39 and have a mammogram each year. · Start at age 48 and have a mammogram every 2 years. When to stop having mammograms is another decision. You and your doctor can decide on the right age to start and stop screening based on your personal preferences and overall health. What is your risk for breast cancer?  
If you don't already know your risk of breast cancer, you can ask your doctor about it. You can also look it up at www.cancer.gov/bcrisktool/. If your doctor says that you have a high or very high risk, ask about ways to reduce your risk. These could include getting extra screening, taking medicine, or having surgery. If you have a strong family history of breast cancer, ask your doctor about genetic testing. What steps can you take to stay healthy? Some things that increase your risk of breast cancer, such as your age and being female, cannot be controlled. But you can do some things to stay as healthy as you can. · Learn what your breasts normally look and feel like. If you notice any changes, tell your doctor. · If you drink alcohol, limit how much you drink. Any amount of alcohol may increase your risk for some types of cancer. · If you smoke, quit. When you quit smoking, you lower your chances of getting many types of cancer. You can also do your best to eat well, be active, and stay at a healthy weight. Eating healthy foods and being active every day, as well as staying at a healthy weight, may help prevent cancer. Where can you learn more? Go to http://www.gray.com/ Enter R796 in the search box to learn more about \"Learning About Breast Cancer Screening. \" Current as of: December 17, 2020               Content Version: 12.8 © 2006-2021 [employer]. Care instructions adapted under license by [employer] (which disclaims liability or warranty for this information). If you have questions about a medical condition or this instruction, always ask your healthcare professional. [place] [address] any warranty or liability for your use of this information. Mammogram: About This Test 
What is it? A mammogram is an X-ray of the breast that is used to screen for breast cancer. This test can find tumors that are too small for you or your doctor to feel.  Cancer is most easily treated when it is found at an early stage. Why is this test done? A mammogram is done to: 
· Look for breast cancer in women who don't have symptoms. · Find breast cancer in women who have symptoms. Symptoms of breast cancer may include a lump or thickening in the breast, nipple discharge, or dimpling of the skin on one area of the breast. 
· Find an area of suspicious breast tissue to remove for an exam under a microscope (biopsy). How do you prepare for the test? 
If you've had a mammogram before at another clinic, have the results sent or bring them with you to your appointment. On the day of the mammogram, don't use any deodorant. And don't use perfume, powders, or ointments near or on your breasts. The residue left on your skin by these substances may interfere with the X-rays. How is the test done? · You will need to take off any jewelry that might interfere with the X-ray pictures. · You will need to take off your clothes above the waist. 
· You will be given a cloth or paper gown to use during the test. 
· You probably will stand during the mammogram. 
· One at a time, your breasts will be placed on a flat plate. · Another plate is then pressed firmly against your breast to help flatten out the breast tissue. You may be asked to lift your arm. · For a few seconds while the X-ray picture is being taken, you will need to hold your breath. · At least two pictures are taken of each breast. One is taken from the top and one from the side. How does having a mammogram feel? A mammogram is often uncomfortable but rarely painful. If you have sensitive or fragile skin or a skin condition, let the technician know before you have your exam. If you have menstrual periods, the procedure is more comfortable when done within 2 weeks after your period has ended. Having your breasts flattened is usually uncomfortable, but it helps the technician get the best images. How long does the test take?  
· The test will take about 10 to 15 minutes. You may be in the clinic for up to an hour. · You may be asked to wait a few minutes while the images are checked to make sure they don't need to be redone. What happens after the test? 
· You will probably be able to go home right away. · You can go back to your usual activities right away. Follow-up care is a key part of your treatment and safety. Be sure to make and go to all appointments, and call your doctor if you are having problems. It's also a good idea to keep a list of the medicines you take. Ask your doctor when you can expect to have your test results. Where can you learn more? Go to http://www.gray.com/ Enter A423 in the search box to learn more about \"Mammogram: About This Test.\" Current as of: December 17, 2020               Content Version: 12.8 © 3304-1946 [employer]. Care instructions adapted under license by [email or web address] (which disclaims liability or warranty for this information). If you have questions about a medical condition or this instruction, always ask your healthcare professional. [address] any warranty or liability for your use of this information.

## 2021-06-17 ENCOUNTER — TELEPHONE (OUTPATIENT)
Dept: FAMILY MEDICINE CLINIC | Age: 61
End: 2021-06-17

## 2021-11-12 ENCOUNTER — TELEPHONE (OUTPATIENT)
Dept: FAMILY MEDICINE CLINIC | Age: 61
End: 2021-11-12

## 2022-03-19 PROBLEM — K85.10 GALL STONE PANCREATITIS: Status: ACTIVE | Noted: 2020-07-22

## 2022-03-19 PROBLEM — F20.9 SCHIZOPHRENIA (HCC): Status: ACTIVE | Noted: 2021-04-20

## 2023-01-12 NOTE — PROGRESS NOTES
[facility]  Hospitalist Group                                                                                          Hospitalist Progress Note  Nicolette Ovalles MD  Answering service: [phone] OR [phone] from in house phone        Date of Service:  2020  NAME:  Ragini Steve  :  1960  MRN:  [id number]      Admission Summary:   61-year-old female with a past medical history of schizoaffective disorder, who presents to the hospital with abdominal pain. Interval history / Subjective:   F/u for gall stone pancreatitis     Sitting in chair,denied any nausea/vomiting,abdominal pain  Assessment & Plan:     # Gall stone pancreatitis:improving  #Transminitis and hyperbilirubinemia-improving  MRCP showed  Cholelithiasis,there is intra and extrahepatic ductal dilatation. Pancreatic duct is dilatedto 6 mm. There is choledocholithiasis with a 0.7 cm calculus at the ampulla and  a 1.3 cm stone in the distal common bile duct  -GI following  -s/p ERCP  -ERCP with biliary sphincterotomy, biliary stone removal, biliary stent placement. OP follow up for stent removal.  -Continue IVF for now  - prn roxycodone  -On full liquid diet  -Plan for cholecystectomy on Monday  LFTs trending down      #Elevated troponin -Type 2 MI:  -cardiac cath showed trivial CAD  -will start aspirin and statin after surgery once LFTs trend down    #HTN  -on lisinopril.     Hypokalemia:  repleted    Moderate mitral regurgitation:  -cardiology follow up    Code status: full  DVT prophylaxis: heparin    Care Plan discussed with:patient,nurse  Anticipated Disposition:home  Anticipated Discharge: TBD     Hospital Problems  Never Reviewed          Codes Class Noted POA    Elevated troponin ICD-10-CM: R79.89  ICD-9-CM: 790.6  2020 Yes        Gall stone pancreatitis ICD-10-CM: K85.10  ICD-9-CM: 847.3, 574.20  2020 Yes        * (Principal) Chest pain ICD-10-CM: R07.9  ICD-9-CM: 786.50  2020 Yes    Overview Signed Sw called patient to follow up on referral from clinic staff regarding advocate needs.    Left message requesting a call back.  SW will try again at a different time.   7/23/2020 10:31 AM by Carrington Acharya MD     Added automatically from request for surgery [id number]                     Review of Systems:   A comprehensive review of systems was negative except for that written in the HPI. Vital Signs:    Last 24hrs VS reviewed since prior progress note. Most recent are:  Visit Vitals  /70 (BP 1 Location: Right arm, BP Patient Position: Sitting)   Pulse (!) 53   Temp 97.5 °F (36.4 °C)   Resp 16   Ht 5' 7\" (1.702 m)   Wt 66.2 kg (145 lb 15.1 oz)   SpO2 98%   BMI 22.86 kg/m²       No intake or output data in the 24 hours ending 07/26/20 1719     Physical Examination:             Constitutional:  No acute distress, cooperative, pleasant    ENT:  Oral mucosa moist, oropharynx benign. Resp:  clear to auscultation b/l, no wheezing. CV:  Regular rhythm, normal rate, S1,S2 wnl    GI:  Soft, non distended, non tender, normoactive bowel sounds    Musculoskeletal: No LE edema    Neurologic:  Moves all extremities. AAOx3     Psych:   Not anxious nor agitated. Skin:  no rashes or ulcers       Data Review:    Review and/or order of clinical lab test  Review and/or order of tests in the medicine section of CPT      Labs:     Recent Labs     07/24/20  0406   WBC 6.6   HGB 11.8   HCT 36.0        Recent Labs     07/26/20 0425 07/25/20  0502 07/24/20  0321    137 135*   K 3.6 3.1* 3.7    105 106   CO2 25 25 21   BUN 6 8 12   CREA 0.75 0.64 0.66   * 105* 110*   CA 8.9 8.2* 8.6     Recent Labs     07/26/20  0425 07/25/20  0502 07/24/20  0321   * 460* 598*   * 540* 613*   TBILI 2.4* 3.6* 7.4*   TP 7.1 6.3* 7.3   ALB 3.2* 2.8* 3.1*   GLOB 3.9 3.5 4.2*   LPSE 152 197 >3,000*     Recent Labs     07/26/20 0425   APTT 26.1      No results for input(s): FE, TIBC, PSAT, FERR in the last 72 hours. No results found for: FOL, RBCF   No results for input(s): PH, PCO2, PO2 in the last 72 hours.   No results for input(s): CPK, CKNDX, TROIQ in the last 72 hours.    No lab exists for component: CPKMB  No results found for: CHOL, CHOLX, CHLST, CHOLV, HDL, HDLP, LDL, LDLC, DLDLP, TGLX, TRIGL, TRIGP, CHHD, CHHDX  No results found for: GLUCPOC  Lab Results   Component Value Date/Time    Color DARK YELLOW 07/22/2020 09:30 PM    Appearance CLOUDY (A) 07/22/2020 09:30 PM    Specific gravity 1.030 07/22/2020 09:30 PM    pH (UA) 5.0 07/22/2020 09:30 PM    Protein 30 (A) 07/22/2020 09:30 PM    Glucose 250 (A) 07/22/2020 09:30 PM    Ketone TRACE (A) 07/22/2020 09:30 PM    Urobilinogen 1.0 07/22/2020 09:30 PM    Nitrites Positive (A) 07/22/2020 09:30 PM    Leukocyte Esterase SMALL (A) 07/22/2020 09:30 PM    Epithelial cells MODERATE (A) 07/22/2020 09:30 PM    Bacteria 1+ (A) 07/22/2020 09:30 PM    WBC 0-4 07/22/2020 09:30 PM    RBC 0-5 07/22/2020 09:30 PM         Medications Reviewed:     Current Facility-Administered Medications   Medication Dose Route Frequency    HYDROmorphone (PF) (DILAUDID) injection 0.5 mg  0.5 mg IntraVENous Q6H PRN    oxyCODONE IR (ROXICODONE) tablet 5 mg  5 mg Oral Q6H PRN    sodium chloride (NS) flush 5-40 mL  5-40 mL IntraVENous Q8H    sodium chloride (NS) flush 5-40 mL  5-40 mL IntraVENous PRN    acetaminophen (TYLENOL) tablet 650 mg  650 mg Oral Q4H PRN    lisinopriL (PRINIVIL, ZESTRIL) tablet 20 mg  20 mg Oral DAILY    sodium chloride (NS) flush 5-40 mL  5-40 mL IntraVENous Q8H    sodium chloride (NS) flush 5-40 mL  5-40 mL IntraVENous PRN    lactated Ringers infusion  75 mL/hr IntraVENous CONTINUOUS    ondansetron (ZOFRAN) injection 4 mg  4 mg IntraVENous Q4H PRN     ______________________________________________________________________  EXPECTED LENGTH OF STAY: 4d 14h  ACTUAL LENGTH OF STAY:          4                 Arnie Mckinley MD

## 2023-05-16 RX ORDER — CITALOPRAM 40 MG/1
40 TABLET ORAL DAILY
COMMUNITY

## 2023-05-16 RX ORDER — QUETIAPINE FUMARATE 100 MG/1
100 TABLET, FILM COATED ORAL
COMMUNITY

## 2023-11-03 ENCOUNTER — HOSPITAL ENCOUNTER (INPATIENT)
Facility: HOSPITAL | Age: 63
LOS: 7 days | Discharge: HOME | End: 2023-11-10
Attending: EMERGENCY MEDICINE | Admitting: PSYCHIATRY & NEUROLOGY
Payer: COMMERCIAL

## 2023-11-03 DIAGNOSIS — F20.0 PARANOID SCHIZOPHRENIA (HCC): Primary | ICD-10-CM

## 2023-11-03 PROBLEM — F25.9 SCHIZOAFFECTIVE DISORDER (HCC): Status: ACTIVE | Noted: 2023-11-03

## 2023-11-03 LAB
ALBUMIN SERPL-MCNC: 4 G/DL (ref 3.5–5)
ALBUMIN/GLOB SERPL: 1 (ref 1.1–2.2)
ALP SERPL-CCNC: 105 U/L (ref 45–117)
ALT SERPL-CCNC: 28 U/L (ref 12–78)
AMPHET UR QL SCN: NEGATIVE
ANION GAP SERPL CALC-SCNC: 9 MMOL/L (ref 5–15)
APAP SERPL-MCNC: <2 UG/ML (ref 10–30)
APPEARANCE UR: CLEAR
AST SERPL W P-5'-P-CCNC: 24 U/L (ref 15–37)
BACTERIA URNS QL MICRO: NEGATIVE /HPF
BARBITURATES UR QL SCN: NEGATIVE
BASOPHILS # BLD: 0.1 K/UL (ref 0–0.1)
BASOPHILS NFR BLD: 1 % (ref 0–1)
BENZODIAZ UR QL: NEGATIVE
BILIRUB SERPL-MCNC: 0.4 MG/DL (ref 0.2–1)
BILIRUB UR QL: NEGATIVE
BUN SERPL-MCNC: 9 MG/DL (ref 6–20)
BUN/CREAT SERPL: 9 (ref 12–20)
CA-I BLD-MCNC: 9.6 MG/DL (ref 8.5–10.1)
CANNABINOIDS UR QL SCN: NEGATIVE
CHLORIDE SERPL-SCNC: 104 MMOL/L (ref 97–108)
CO2 SERPL-SCNC: 27 MMOL/L (ref 21–32)
COCAINE UR QL SCN: NEGATIVE
COLOR UR: NORMAL
CREAT SERPL-MCNC: 0.99 MG/DL (ref 0.55–1.02)
DATE LAST DOSE: NOT DETECTED
DATE LAST DOSE: NOT DETECTED
DIFFERENTIAL METHOD BLD: ABNORMAL
DOSE AMOUNT: NOT DETECTED UNITS
DOSE AMOUNT: NOT DETECTED UNITS
EOSINOPHIL # BLD: 0.3 K/UL (ref 0–0.4)
EOSINOPHIL NFR BLD: 6 % (ref 0–7)
ERYTHROCYTE [DISTWIDTH] IN BLOOD BY AUTOMATED COUNT: 12.9 % (ref 11.5–14.5)
ETHANOL SERPL-MCNC: <10 MG/DL (ref 0–0.08)
FLUAV RNA SPEC QL NAA+PROBE: NOT DETECTED
FLUBV RNA SPEC QL NAA+PROBE: NOT DETECTED
GLOBULIN SER CALC-MCNC: 4.2 G/DL (ref 2–4)
GLUCOSE SERPL-MCNC: 164 MG/DL (ref 65–100)
GLUCOSE UR STRIP.AUTO-MCNC: NEGATIVE MG/DL
HCT VFR BLD AUTO: 35 % (ref 35–47)
HGB BLD-MCNC: 11.7 G/DL (ref 11.5–16)
HGB UR QL STRIP: NEGATIVE
IMM GRANULOCYTES # BLD AUTO: 0 K/UL (ref 0–0.04)
IMM GRANULOCYTES NFR BLD AUTO: 0 % (ref 0–0.5)
KETONES UR QL STRIP.AUTO: NEGATIVE MG/DL
LEUKOCYTE ESTERASE UR QL STRIP.AUTO: NEGATIVE
LYMPHOCYTES # BLD: 1.6 K/UL (ref 0.8–3.5)
LYMPHOCYTES NFR BLD: 30 % (ref 12–49)
Lab: NORMAL
MAGNESIUM SERPL-MCNC: 1.7 MG/DL (ref 1.6–2.4)
MCH RBC QN AUTO: 32.7 PG (ref 26–34)
MCHC RBC AUTO-ENTMCNC: 33.4 G/DL (ref 30–36.5)
MCV RBC AUTO: 97.8 FL (ref 80–99)
MDMA URINE: NEGATIVE
METHADONE UR QL: NEGATIVE
MONOCYTES # BLD: 0.3 K/UL (ref 0–1)
MONOCYTES NFR BLD: 6 % (ref 5–13)
NEUTS SEG # BLD: 2.9 K/UL (ref 1.8–8)
NEUTS SEG NFR BLD: 57 % (ref 32–75)
NITRITE UR QL STRIP.AUTO: NEGATIVE
NRBC # BLD: 0 K/UL (ref 0–0.01)
NRBC BLD-RTO: 0 PER 100 WBC
OPIATES UR QL: NEGATIVE
PCP UR QL: NEGATIVE
PH UR STRIP: 5.5 (ref 5–8)
PLATELET # BLD AUTO: 225 K/UL (ref 150–400)
PMV BLD AUTO: 10.3 FL (ref 8.9–12.9)
POTASSIUM SERPL-SCNC: 3.4 MMOL/L (ref 3.5–5.1)
PROT SERPL-MCNC: 8.2 G/DL (ref 6.4–8.2)
PROT UR STRIP-MCNC: NEGATIVE MG/DL
RBC # BLD AUTO: 3.58 M/UL (ref 3.8–5.2)
RBC #/AREA URNS HPF: NORMAL /HPF (ref 0–3)
RBC MORPH BLD: ABNORMAL
SALICYLATES SERPL-MCNC: <1.7 MG/DL (ref 2.8–20)
SARS-COV-2 RNA RESP QL NAA+PROBE: NOT DETECTED
SODIUM SERPL-SCNC: 140 MMOL/L (ref 136–145)
SP GR UR REFRACTOMETRY: 1.02 (ref 1–1.03)
UROBILINOGEN UR QL STRIP.AUTO: 0.2 EU/DL (ref 0.2–1)
WBC # BLD AUTO: 5.2 K/UL (ref 3.6–11)
WBC URNS QL MICRO: NORMAL /HPF (ref 0–5)

## 2023-11-03 PROCEDURE — 99285 EMERGENCY DEPT VISIT HI MDM: CPT

## 2023-11-03 PROCEDURE — 6370000000 HC RX 637 (ALT 250 FOR IP): Performed by: PSYCHIATRY & NEUROLOGY

## 2023-11-03 PROCEDURE — 80179 DRUG ASSAY SALICYLATE: CPT

## 2023-11-03 PROCEDURE — 83735 ASSAY OF MAGNESIUM: CPT

## 2023-11-03 PROCEDURE — 82077 ASSAY SPEC XCP UR&BREATH IA: CPT

## 2023-11-03 PROCEDURE — 80307 DRUG TEST PRSMV CHEM ANLYZR: CPT

## 2023-11-03 PROCEDURE — 80053 COMPREHEN METABOLIC PANEL: CPT

## 2023-11-03 PROCEDURE — 81001 URINALYSIS AUTO W/SCOPE: CPT

## 2023-11-03 PROCEDURE — 87636 SARSCOV2 & INF A&B AMP PRB: CPT

## 2023-11-03 PROCEDURE — 93005 ELECTROCARDIOGRAM TRACING: CPT | Performed by: EMERGENCY MEDICINE

## 2023-11-03 PROCEDURE — 85025 COMPLETE CBC W/AUTO DIFF WBC: CPT

## 2023-11-03 PROCEDURE — 1240000000 HC EMOTIONAL WELLNESS R&B

## 2023-11-03 PROCEDURE — 80143 DRUG ASSAY ACETAMINOPHEN: CPT

## 2023-11-03 PROCEDURE — 36415 COLL VENOUS BLD VENIPUNCTURE: CPT

## 2023-11-03 RX ORDER — POLYETHYLENE GLYCOL 3350 17 G/17G
17 POWDER, FOR SOLUTION ORAL DAILY PRN
Status: DISCONTINUED | OUTPATIENT
Start: 2023-11-03 | End: 2023-11-10 | Stop reason: HOSPADM

## 2023-11-03 RX ORDER — DIPHENHYDRAMINE HYDROCHLORIDE 50 MG/ML
50 INJECTION INTRAMUSCULAR; INTRAVENOUS EVERY 4 HOURS PRN
Status: DISCONTINUED | OUTPATIENT
Start: 2023-11-03 | End: 2023-11-10 | Stop reason: HOSPADM

## 2023-11-03 RX ORDER — ACETAMINOPHEN 325 MG/1
650 TABLET ORAL EVERY 4 HOURS PRN
Status: DISCONTINUED | OUTPATIENT
Start: 2023-11-03 | End: 2023-11-10 | Stop reason: HOSPADM

## 2023-11-03 RX ORDER — HALOPERIDOL 5 MG/1
5 TABLET ORAL EVERY 4 HOURS PRN
Status: DISCONTINUED | OUTPATIENT
Start: 2023-11-03 | End: 2023-11-10 | Stop reason: HOSPADM

## 2023-11-03 RX ORDER — HALOPERIDOL 5 MG/ML
5 INJECTION INTRAMUSCULAR EVERY 4 HOURS PRN
Status: DISCONTINUED | OUTPATIENT
Start: 2023-11-03 | End: 2023-11-10 | Stop reason: HOSPADM

## 2023-11-03 RX ORDER — MAGNESIUM HYDROXIDE/ALUMINUM HYDROXICE/SIMETHICONE 120; 1200; 1200 MG/30ML; MG/30ML; MG/30ML
30 SUSPENSION ORAL EVERY 6 HOURS PRN
Status: DISCONTINUED | OUTPATIENT
Start: 2023-11-03 | End: 2023-11-10 | Stop reason: HOSPADM

## 2023-11-03 RX ORDER — TRAZODONE HYDROCHLORIDE 50 MG/1
50 TABLET ORAL NIGHTLY PRN
Status: DISCONTINUED | OUTPATIENT
Start: 2023-11-03 | End: 2023-11-10 | Stop reason: HOSPADM

## 2023-11-03 RX ORDER — HYDROXYZINE 50 MG/1
50 TABLET, FILM COATED ORAL 3 TIMES DAILY PRN
Status: DISCONTINUED | OUTPATIENT
Start: 2023-11-03 | End: 2023-11-10 | Stop reason: HOSPADM

## 2023-11-03 RX ADMIN — HYDROXYZINE HYDROCHLORIDE 50 MG: 50 TABLET, FILM COATED ORAL at 20:43

## 2023-11-03 ASSESSMENT — PATIENT HEALTH QUESTIONNAIRE - PHQ9
SUM OF ALL RESPONSES TO PHQ QUESTIONS 1-9: 0
1. LITTLE INTEREST OR PLEASURE IN DOING THINGS: 0
SUM OF ALL RESPONSES TO PHQ9 QUESTIONS 1 & 2: 0
2. FEELING DOWN, DEPRESSED OR HOPELESS: 0
SUM OF ALL RESPONSES TO PHQ QUESTIONS 1-9: 0

## 2023-11-03 ASSESSMENT — SLEEP AND FATIGUE QUESTIONNAIRES
AVERAGE NUMBER OF SLEEP HOURS: 8
DO YOU USE A SLEEP AID: NO
DO YOU HAVE DIFFICULTY SLEEPING: NO

## 2023-11-03 ASSESSMENT — LIFESTYLE VARIABLES
HOW OFTEN DO YOU HAVE A DRINK CONTAINING ALCOHOL: NEVER
HOW MANY STANDARD DRINKS CONTAINING ALCOHOL DO YOU HAVE ON A TYPICAL DAY: PATIENT DOES NOT DRINK

## 2023-11-03 NOTE — ED TRIAGE NOTES
Patient brought in by [facility] PD with [employer] papers taken out by d19 due to increase in delusions patient states that she was given a water bill for $300 and that her rent person told her she has a been running water all night patient reports that she does not do that and she does not use her water lie that patient reports she is not paranoid and thinks her neighbors are adding their water bill to hers   Patient reports she is not suicidal or homicidal

## 2023-11-03 NOTE — ED NOTES
Pt declining to eat dinner pt reported she ate this morning but has refused all offers of food or drinks since being in this ED      Toribio Campuzano RN  11/03/23 0937

## 2023-11-04 PROBLEM — I10 HTN (HYPERTENSION): Status: ACTIVE | Noted: 2023-11-04

## 2023-11-04 PROBLEM — I25.10 CAD (CORONARY ARTERY DISEASE): Status: ACTIVE | Noted: 2023-11-04

## 2023-11-04 PROBLEM — I34.0 MITRAL REGURGITATION: Status: ACTIVE | Noted: 2023-11-04

## 2023-11-04 LAB
CHOLEST SERPL-MCNC: 178 MG/DL
EST. AVERAGE GLUCOSE BLD GHB EST-MCNC: 134 MG/DL
HBA1C MFR BLD: 6.3 % (ref 4–5.6)
HDLC SERPL-MCNC: 90 MG/DL
HDLC SERPL: 2 (ref 0–5)
LDLC SERPL CALC-MCNC: 80 MG/DL (ref 0–100)
LIPID PANEL: NORMAL
TRIGL SERPL-MCNC: 40 MG/DL
VLDLC SERPL CALC-MCNC: 8 MG/DL

## 2023-11-04 PROCEDURE — 36415 COLL VENOUS BLD VENIPUNCTURE: CPT

## 2023-11-04 PROCEDURE — 1240000000 HC EMOTIONAL WELLNESS R&B

## 2023-11-04 PROCEDURE — 84443 ASSAY THYROID STIM HORMONE: CPT

## 2023-11-04 PROCEDURE — 6370000000 HC RX 637 (ALT 250 FOR IP): Performed by: PSYCHIATRY & NEUROLOGY

## 2023-11-04 PROCEDURE — 6370000000 HC RX 637 (ALT 250 FOR IP): Performed by: PHYSICIAN ASSISTANT

## 2023-11-04 PROCEDURE — 83036 HEMOGLOBIN GLYCOSYLATED A1C: CPT

## 2023-11-04 PROCEDURE — 80061 LIPID PANEL: CPT

## 2023-11-04 RX ORDER — QUETIAPINE FUMARATE 100 MG/1
100 TABLET, FILM COATED ORAL NIGHTLY
Status: DISCONTINUED | OUTPATIENT
Start: 2023-11-04 | End: 2023-11-07

## 2023-11-04 RX ORDER — ARIPIPRAZOLE 5 MG/1
5 TABLET ORAL DAILY
Status: DISCONTINUED | OUTPATIENT
Start: 2023-11-04 | End: 2023-11-06

## 2023-11-04 RX ADMIN — HYDROXYZINE HYDROCHLORIDE 50 MG: 50 TABLET, FILM COATED ORAL at 08:11

## 2023-11-04 RX ADMIN — QUETIAPINE FUMARATE 100 MG: 100 TABLET ORAL at 21:07

## 2023-11-04 RX ADMIN — ARIPIPRAZOLE 5 MG: 5 TABLET ORAL at 16:05

## 2023-11-04 RX ADMIN — HALOPERIDOL 5 MG: 5 TABLET ORAL at 08:11

## 2023-11-04 RX ADMIN — TRAZODONE HYDROCHLORIDE 50 MG: 50 TABLET ORAL at 01:20

## 2023-11-04 NOTE — BH NOTE
Pt. Intermittently walkng down the hallway and in and out of day room. No outbursts noted with distraction. Pt. Accepted lunch tray land is currently eating lunch.

## 2023-11-04 NOTE — BH NOTE
Hospitalist was notified of pt. Needing new pt. Evaluation. Due to some difficulty finding Hospitalists name in the order system,as address is [place], and not all staff were aware of this to find the correct person, per night shift nurse, Hospitalist did not receive on morning rounds sheet. Writer did however, call and notify hospitalist verbally that pt. Needed to be seen, as usual, as per our protocol. Notification was given by writer, within the window needed to be evaluated.

## 2023-11-04 NOTE — BH NOTE
ADMISSION NOTE    Female pt 66years old TDO admmitted at [address] [place] [address]   11/3/2023    Pt. Arrived on the unit at  1930  approx:  escorted by [facility] and   and ED staff  via Via wheelchair. From our  ER. Pt. Awake. Alert and orinted x 3, hyperactive with pressured speech , flight of ideas, paranoid, irritable and preoccupied with the thought of \" female jealousy issues\"       Admission Type:   Admission Type: Involuntary    Reason for admission:   Reason for Admission: {Pt said \"It is female jealousy things, they make me taking off my jewellries and beautiful clothes, all happen because of Jealousy issues\"      Addictive Behavior:   Addictive Behavior  In the Past 3 Months, Have You Felt or Has Someone Told You That You Have a Problem With  : None      Medical Problems:   Past Medical History:   Diagnosis Date    Bug bite     scattered on back legs forarms    Constipation     Diarrhea     Pancreatitis, chronic ([address])     pt reported having chronic pancreatitis         Psych History:  yes:  per D-19  prescreen ; pt has  Hx  of schizoaffective disorder , bipolar type and is known pt to [facility] [employer] [facility], and recives case medication management  servoces her cse manager is Otto Murray. Patient is not a smoker. Patient does not drink Alcohol. Patient does not, use Recreational substances or Street Drugs.       Status EXAM:  Mental Status and Behavioral Exam  Normal: No  Level of Assistance: Independent/Self  Facial Expression: Worried  Affect: Congruent  Level of Consciousness: Alert  Frequency of Checks: 4 times per hour, close  Mood:Normal: No  Mood: Labile, Suspicious, Irritable  Motor Activity:Normal: No  Motor Activity: Increased  Eye Contact: Good  Observed Behavior: Preoccupied, Other (comment) (paranoid, grandioes  others and secuirty jealous of her)  Sexual Misconduct History: Current - no  Preception: [place] to person, [place] to time, [place] to place  Attention:Normal: Completed. Patient changed into Facility issued Clothing. Patient oriented to Unit, Peers, Staff, and [employer]. Handbook explained,  did not signed for and Copy given to patient with Personal ID Code. COVID Vaccine: Pt reported reciveing  4 doses ov Covid vaccine.     Flu Vaccine: Pt reported reciveing Flu vaccine a last month Octover 2023      Christie Arnold RN

## 2023-11-04 NOTE — PROGRESS NOTES
B:   Patient alert and oriented x 4. Pt. States depression is 0. Pt. States Anxiety is 6. Pt. denies Hallucinations. Pt. denies Delusions. Pt. denies SI.  Pt. denies HI. Pt. cooperative with Assessment. Pt.'s behavior Anxious, Cooperative, and Pleasant. Pt. Has good eye contact, hyper verbal at times when speaking with staff. Pt. Able to make needs known, Pt. Continues to be preoccupied with people around her, treating her bad sending her here on a TDO because she dresses good, and people around her get jealous, and she knows what she speaks about and people don't like it, and it was in October in 2018, that this happen to her the same way then and she was wearing a red shirt then when it happened too, just like yesterday. Pt. States, that the people that come to her water meter, make her water more and turn her neighbors down to lower their water bill, and she feels angry    I:    If patient is disoriented, reorient pt. Build trust with patient, by therapeutic listening and Groups. Encourage pt. To attend and Participate in Groups. Provide Medications as ordered and needed. Encourage pt. To be up for all meals and snacks, and consume all of each. Encourage pt. To interact with staff and peers in a positive manner. Encourage pt. To keep good hygiene. Q 15 minute safety checks. R:   Pt. did attend and Participate in Group. Pt. Is Compliant with Medications   Yes. Pt. is getting up for meals and snacks. Pt. Consumes 100% of Meals. Pt. is, interacting with Peers. Pt.'s hygiene is Good. Pt. does not, have any safety issues. P:   Pt. Will develop and continue to utilize positive Coping skills. Pt. Will continue to comply with Plan of Care toward Discharge. Pt. Will continue to stay safe on the unit.

## 2023-11-04 NOTE — PLAN OF CARE
Problem: Risk for Elopement  Goal: Patient will not exit the unit/facility without proper excort  Outcome: Progressing  Flowsheets (Taken 11/4/2023 0046)  Nursing Interventions for Elopement Risk:   Assist with personal care needs such as toileting, eating, dressing, as needed to reduce the risk of wandering   Escort with two staff members if patient must leave the unit   Make sure patient has all necessary personal care items   Place patient in room far away from exits and stairways   Reduce environmental triggers   Shoes and clothing collected and placed in gown attire     Problem: Safety - Adult  Goal: Free from fall injury  Outcome: Progressing     Problem: Chloe  Goal: Will exhibit normal sleep and speech and no impulsivity  Description: INTERVENTIONS:  1. Administer medication as ordered  2. Set limits on impulsive behavior  3.  Make attempts to decrease external stimuli as possible  Outcome: Progressing

## 2023-11-04 NOTE — BH NOTE
Pt start sleeping again at [phone] remained sleeping as of this time.   No violent no self harming behavior noticed or reported

## 2023-11-04 NOTE — BH NOTE
SHIRA Pineda saw pt. In person. Orders to start Seroquel 100 mg PO QHS and start Abilify 5mg pot QD, starting now. Pt. In agreement.
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
## 2023-11-05 PROCEDURE — 6370000000 HC RX 637 (ALT 250 FOR IP): Performed by: PHYSICIAN ASSISTANT

## 2023-11-05 PROCEDURE — 1240000000 HC EMOTIONAL WELLNESS R&B

## 2023-11-05 PROCEDURE — 6370000000 HC RX 637 (ALT 250 FOR IP): Performed by: PSYCHIATRY & NEUROLOGY

## 2023-11-05 RX ADMIN — ARIPIPRAZOLE 5 MG: 5 TABLET ORAL at 07:46

## 2023-11-05 RX ADMIN — QUETIAPINE FUMARATE 100 MG: 100 TABLET ORAL at 20:24

## 2023-11-05 RX ADMIN — HYDROXYZINE HYDROCHLORIDE 50 MG: 50 TABLET, FILM COATED ORAL at 13:03

## 2023-11-05 NOTE — PROGRESS NOTES
B:   Patient alert and oriented x 4. Pt. States depression is 0. Pt. States Anxiety is 6. Pt. denies Hallucinations. Pt. denies Delusions. Pt. denies SI.  Pt. denies HI. Pt. cooperative with Assessment. Pt.'s behavior Anxious, Cooperative, and Pleasant. Pt. Has good eye contact, . Pt. Able to make needs known, Pt. States she slept good, pt. Up for breakfast, attempting to interact with female peer. Quieter upon speaking with staff, no verbalization of preoccupations, regarding water, rent, people causing her issues, this am, thus far. I:    If patient is disoriented, reorient pt. Build trust with patient, by therapeutic listening and Groups. Encourage pt. To attend and Participate in Groups. Provide Medications as ordered and needed. Encourage pt. To be up for all meals and snacks, and consume all of each. Encourage pt. To interact with staff and peers in a positive manner. Encourage pt. To keep good hygiene. Q 15 minute safety checks. R:   Pt. did attend and Participate in Group. Pt. Is Compliant with Medications   Yes. Pt. is getting up for meals and snacks. Pt. Consumes 100% of Meals. Pt. is, interacting with Peers. Pt.'s hygiene is Good. Pt. does not, have any safety issues. P:   Pt. Will develop and continue to utilize positive Coping skills. Pt. Will continue to comply with Plan of Care toward Discharge. Pt. Will continue to stay safe on the unit.

## 2023-11-05 NOTE — BH NOTE
Pt. Calm this am cooperative, Pt. Called her sister at phone time. Pt. Was getting louder as she spoke with her sister trying to explain to her sister about her feelings regarding her situation at her house regarding water bill and feeling people are after her, Pt. Started yelling at there sister, saying , \" I am quieting down, I am allowed to be angry, I should not be here\". Yuniel Avalos up on her sister, came to nurses station window, stating she was not going to eat dinner, she did not want bothered, he just wanted to be left alone. Writer reassured pt. Her wishes would be honored. Pt. Harley Rides going in and out of her room, to nurses station, talking about her feelings, slowly quieting each time she was talking. Writer asked pt. If she would like something to help her relax, to be able to voice her feelings calmer, pt. Stated, \" yes\", writer administered PRN PO Atarax 50mg, without issue. Pt. Sunny Gomez in bed with eyes open.

## 2023-11-05 NOTE — GROUP NOTE
Group Therapy Note    Date: 11/5/2023    Group Start Time: 1000  Group End Time: 1030  Group Topic: Community Meeting     [facility] [place] Therapy Note    Attendees: 2       Patient's Goal:  To talk to someone about my situation. Notes:  Pt did participate in group this morning. Status After Intervention:  Improved    Participation Level:  Active Listener    Participation Quality: Attentive      Speech:  normal      Thought Process/Content: Logical      Affective Functioning: Congruent      Mood: elevated      Level of consciousness:  Attentive      Response to Learning: Able to verbalize/acknowledge new learning      Endings: None Reported    Modes of Intervention: Activity      Discipline Responsible: Behavorial Health Tech      Signature:  Power Pearson

## 2023-11-05 NOTE — BH NOTE
Pt slept overnight ,remained a sleeping as of this time No violent no self harming behavior noticed or reported

## 2023-11-05 NOTE — PLAN OF CARE
Problem: Risk for Elopement  Goal: Patient will not exit the unit/facility without proper excort  11/4/2023 2218 by Reza Luque RN  Outcome: Progressing  11/4/2023 0928 by Gerardo Alvarado LPN  Outcome: Progressing     Problem: Discharge Planning  Goal: Discharge to home or other facility with appropriate resources  11/4/2023 2218 by Reza Luque RN  Outcome: Progressing  11/4/2023 0928 by Gerardo Alvarado LPN  Outcome: Progressing     Problem: Safety - Adult  Goal: Free from fall injury  11/4/2023 2218 by Reza Luque RN  Outcome: Progressing  11/4/2023 0928 by Gerardo Alvarado LPN  Outcome: Progressing     Problem: ABCDS Injury Assessment  Goal: Absence of physical injury  11/4/2023 2218 by Reza Luque RN  Outcome: Progressing  11/4/2023 0928 by Gerardo Alvarado LPN  Outcome: Progressing     Problem: Chloe  Goal: Will exhibit normal sleep and speech and no impulsivity  Description: INTERVENTIONS:  1. Administer medication as ordered  2. Set limits on impulsive behavior  3. Make attempts to decrease external stimuli as possible  11/4/2023 2218 by Reza Luque RN  Outcome: Progressing  11/4/2023 0928 by Gerardo Alvarado LPN  Outcome: Progressing     Problem: Psychosis  Goal: Will report no hallucinations or delusions  Description: INTERVENTIONS:  1. Administer medication as  ordered  2. Assist with reality testing to support increasing orientation  3. Assess if patient's hallucinations or delusions are encouraging self harm or harm to others and intervene as appropriate  11/4/2023 2218 by Reza Luque RN  Outcome: Progressing  11/4/2023 0928 by Gerardo Alvarado LPN  Outcome: Progressing     Problem: Behavior  Goal: Pt/Family maintain appropriate behavior and adhere to behavioral management agreement, if implemented  Description: INTERVENTIONS:  1. Assess patient/family's coping skills and  non-compliant behavior (including use of illegal substances)  2.

## 2023-11-05 NOTE — BH NOTE
Pt. Has quieted down when speaking, walking in hallway and into day room.   Occasionally up to nurses station, then back to room

## 2023-11-05 NOTE — BH NOTE
Pt received in bed sleeping, did not attend group pr eat snack as she remained sleeping. Upon assessment when woke up by me  at 2110 to give her HS medication . Pt alert and oriented x 4 denies SI/HI, denies pain, denies anxiety or depression. No delusional statement made  by pt this shift as of this time. Pt start sleeping by 2130,remained sleeping as of this time.     No violent no self harming behavior noticed or reported

## 2023-11-05 NOTE — BH NOTE
Pt. Has been calm, resting with eyes open and closed. Pt. States she does not want to eat dinner tonight, she will eat snack later before bed.

## 2023-11-05 NOTE — CONSULTS
Hospitalist Consult Note             Chief Complaints:     Chief Complaint   Patient presents with    Mental Health Problem         Subjective:     Jose Ortega is a 66 y.o. female followed by Ishaan Crabtree MD and  has a past medical history of Bug bite, Constipation, Diarrhea, and Pancreatitis, chronic ([address]). As well as pmx CAD, HTN. The patient is currently admitted to the behavioral health unit for worsening schizophrenia symptoms and had been placed under ECO. Per review of records, she has been displaying symptoms of paranoid believes possibly delusions. We are consulted to evaluate and follow medically. I reviewed her prior medical records within epic fairly extensively, including surgical notes from several years ago when she had gallstone pancreatitis and subsequently a cholecystectomy. I see that she has a history of essential hypertension and had an NSTEMI and cardiac catheterization several years ago which revealed only mild coronary artery disease. She was prescribed aspirin and statin at that time but it appears she is not taking these. It does not appear that she is on any antihypertensive medication at home either with a diagnosis of hypertension,However her blood pressures here have been mostly normal.  She has some high pressures recorded but these are when she was agitated. It appears that her pressure is within goal range when she is at rest.    I evaluated the patient around 10:30 AM, she was resting in bed in her room but woke up this notably knocking on the door. She permitted me to come in her room to speak with her. She sat up on the side of the bed and denied any physical complaints including chest pain, shortness of breath, cough, fever chills, abdominal pain, headache, dysuria. She confirmed that she has not been taking any prescription meds at home, and denies substance use.   She then spoke for approximately 30 minutes early continuously about financial

## 2023-11-05 NOTE — GROUP NOTE
Group Therapy Note    Date: 11/5/2023    Group Start Time: 1100  Group End Time: 3167  Group Topic: Nursing    [facility] [address], LPN        Group Therapy Note    Attendees: 2/2       Patient's Goal:  to get home and take care of her water bill    Notes:  Medication compliance    Status After Intervention:  Unchanged    Participation Level:  Active Listener    Participation Quality: Appropriate, Attentive, Sharing, and Supportive      Speech:  normal      Thought Process/Content: Logical      Affective Functioning: Congruent      Mood: euthymic      Level of consciousness:  Alert, Oriented x4, and Attentive      Response to Learning: Capable of insight and Progressing to goal      Endings: None Reported    Modes of Intervention: Education      Discipline Responsible: Licensed Practical Nurse      Signature:  Ena Hanson LPN

## 2023-11-06 LAB
EKG ATRIAL RATE: 84 BPM
EKG DIAGNOSIS: NORMAL
EKG P AXIS: 43 DEGREES
EKG P-R INTERVAL: 139 MS
EKG Q-T INTERVAL: 407 MS
EKG QRS DURATION: 102 MS
EKG QTC CALCULATION (BAZETT): 482 MS
EKG R AXIS: 34 DEGREES
EKG T AXIS: 55 DEGREES
EKG VENTRICULAR RATE: 84 BPM

## 2023-11-06 PROCEDURE — 6370000000 HC RX 637 (ALT 250 FOR IP): Performed by: PHYSICIAN ASSISTANT

## 2023-11-06 PROCEDURE — 6370000000 HC RX 637 (ALT 250 FOR IP): Performed by: PSYCHIATRY & NEUROLOGY

## 2023-11-06 PROCEDURE — 1240000000 HC EMOTIONAL WELLNESS R&B

## 2023-11-06 RX ORDER — CITALOPRAM 20 MG/1
10 TABLET ORAL DAILY
Status: DISCONTINUED | OUTPATIENT
Start: 2023-11-06 | End: 2023-11-10 | Stop reason: HOSPADM

## 2023-11-06 RX ADMIN — ARIPIPRAZOLE 5 MG: 5 TABLET ORAL at 09:03

## 2023-11-06 RX ADMIN — QUETIAPINE FUMARATE 100 MG: 100 TABLET ORAL at 20:45

## 2023-11-06 RX ADMIN — CITALOPRAM HYDROBROMIDE 10 MG: 20 TABLET ORAL at 11:49

## 2023-11-06 NOTE — GROUP NOTE
Group Therapy Note    Date: 11/6/2023    Group Start Time: 1000  Group End Time: 3820  Group Topic: Community Meeting    [facility] [place]        Group Therapy Note    Attendees: 2       Patient's Goal:  To Focus    Notes:      Status After Intervention:  Unchanged    Participation Level:  Active Listener    Participation Quality: Appropriate      Speech:  normal      Thought Process/Content: Logical      Affective Functioning: Congruent      Mood: anxious      Level of consciousness:  Alert      Response to Learning: Able to verbalize/acknowledge new learning      Endings: None Reported    Modes of Intervention: Support      Discipline Responsible: [address] [facility]      Signature:  Amy Caldwell

## 2023-11-06 NOTE — BH NOTE
Pt slept overnight ,remained a sleeping till 0510 came to [facility] and talked to staff about her water  bill , No violent no self harming behavior noticed or reported

## 2023-11-06 NOTE — GROUP NOTE
Group Therapy Note    Date: 11/6/2023    Group Start Time: 1400  Group End Time: 0354  Group Topic:  Group    [facility] [address] Therapy Note    Attendees: 0    Writer attempted processing group and psych-ed. Patients refused. Pt refused.        Signature:  Viridiana Connell
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
## 2023-11-06 NOTE — BH NOTE
A commitment hearing was held with Dorothy Manuel with pt represented by  Daiana Collazo. Pt committed involuntarily for up to 30 days.

## 2023-11-06 NOTE — PLAN OF CARE
Problem: Risk for Elopement  Goal: Patient will not exit the unit/facility without proper excort  11/5/2023 2103 by Richa Kat RN  Outcome: Progressing  11/5/2023 0811 by Jamel Rose LPN  Outcome: Progressing     Problem: Discharge Planning  Goal: Discharge to home or other facility with appropriate resources  11/5/2023 2103 by Richa Kat RN  Outcome: Progressing  11/5/2023 0811 by Jamel Rose LPN  Outcome: Progressing     Problem: Safety - Adult  Goal: Free from fall injury  11/5/2023 2103 by Richa Kat RN  Outcome: Progressing  11/5/2023 0811 by Jamel Rose LPN  Outcome: Progressing     Problem: ABCDS Injury Assessment  Goal: Absence of physical injury  11/5/2023 2103 by Richa Kat RN  Outcome: Progressing  11/5/2023 0811 by Jamel Rose LPN  Outcome: Progressing     Problem: Chloe  Goal: Will exhibit normal sleep and speech and no impulsivity  Description: INTERVENTIONS:  1. Administer medication as ordered  2. Set limits on impulsive behavior  3. Make attempts to decrease external stimuli as possible  11/5/2023 2103 by Richa Kat RN  Outcome: Progressing  11/5/2023 0811 by Jamel Rose LPN  Outcome: Progressing     Problem: Psychosis  Goal: Will report no hallucinations or delusions  Description: INTERVENTIONS:  1. Administer medication as  ordered  2. Assist with reality testing to support increasing orientation  3. Assess if patient's hallucinations or delusions are encouraging self harm or harm to others and intervene as appropriate  11/5/2023 2103 by Richa Kat RN  Outcome: Progressing  11/5/2023 0811 by Jamel Rose LPN  Outcome: Progressing     Problem: Behavior  Goal: Pt/Family maintain appropriate behavior and adhere to behavioral management agreement, if implemented  Description: INTERVENTIONS:  1. Assess patient/family's coping skills and  non-compliant behavior (including use of illegal substances)  2. Notify security of behavior or suspected illegal substances which indicate the need for search of the family and/or belongings  3. Encourage verbalization of thoughts and concerns in a socially appropriate manner  4. Utilize positive, consistent limit setting strategies supporting safety of patient, staff and others  5. Encourage participation in the decision making process about the behavioral management agreement  6. If a visitor's behavior poses a threat to safety call refer to organization policy. 7. Initiate consult with , Psychosocial CNS, Spiritual Care as appropriate  11/5/2023 2103 by Eladia Murcia RN  Outcome: Progressing  11/5/2023 0811 by Ena Hanson LPN  Outcome: Progressing     Problem: Anxiety  Goal: Will report anxiety at manageable levels  Description: INTERVENTIONS:  1. Administer medication as ordered  2. Teach and rehearse alternative coping skills  3. Provide emotional support with 1:1 interaction with staff  11/5/2023 2103 by Eladia Murcia RN  Outcome: Progressing  11/5/2023 0811 by Ena Hanson LPN  Outcome: Progressing     Problem: Involuntary Admit  Goal: Will cooperate with staff recommendations and doctor's orders and will demonstrate appropriate behavior  Description: INTERVENTIONS:  1. Treat underlying conditions and offer medication as ordered  2. Educate regarding involuntary admission procedures and rules  3.  Contain excessive/inappropriate behavior per unit and hospital policies  [id number] by Eladia Murcia RN  Outcome: Progressing  11/5/2023 0811 by Ena Hanson LPN  Outcome: Progressing

## 2023-11-06 NOTE — CARE COORDINATION
11/06/23 1021   ITP   Date of Plan 11/06/23   Date of Next Review 11/13/23   Primary Diagnosis Code Schizoaffective disorder   Barriers to Treatment Client resistance; Need for psychoeducation;Psychiatric symptom (comment)   Strengths [employer] [facility] Financial supports; Natural supports;Postive outlook; Seeking interactions; Acknowledging need for assistance   Plan of Care   Long Term Goal (LTG) Stated in patient/guardian terms On PSA   Short Term Goal 1   Short Term Goal 1 Client will maintain compliance with medication regime   Baseline Functioning Unknown   Target TBD, will get collateral with    Objectives Client will participate in individual therapy;Client will participate in group therapy   Intervention 1 Monitor medications   Frequency Daily   Measured by Staff observation;Behavioral data   Staff Responsible [facility] staff   STG Goal 1 Status: Patient Appears to be  Partially meeting treatment plan goal   Short Term Goal 2   Short Term Goal 2 Client will learn and demonstrate improved self management skills   Baseline Functioning Unknown   Target TBD   Objectives Client will participate in individual therapy;Client will participate in group therapy   Intervention 1 Acknowledge client strengths   Frequency Daily   Measured by Behavioral data; Self report;Staff observation   Staff Responsible Clinical staff;[facility] staff   Intervention 2 Group therapy   Frequency Daily   Measured by Behavioral data; Self report;Staff observation   Staff Responsible [facility] staff;Clinical staff   Intervention 3 Referral to community services   Frequency Weekly   Measured by Behavioral data; Self report;Staff observation   Staff Responsible Clinical staff;[facility] staff   STG Goal 2 Status: Patient Appears to be  Partially meeting treatment plan goal   Crisis/Safety/Discharge Plan   Crisis/Safety Plan Standard program interventions and protocol   Comprehensive Assessment Completion Date 11/06/23   Discharge Plan Home with outpatient

## 2023-11-06 NOTE — BH NOTE
Pt received in bed sleeping, did not attend group  as she remained sleeping refused  eat snack . Upon assessment when woke up by me  at 2024 to give her HS medication . Pt alert and oriented x 4 denies SI/HI, denies pain, denies anxiety or depression. No delusional statement made  by pt     Pt start sleeping by 2045 ,remained sleeping as of this time.      No violent no self harming behavior noticed or reported

## 2023-11-06 NOTE — BH NOTE
PSYCHOSOCIAL ASSESSMENT  :Patient identifying info:   Bren Joy is a 66 y.o., female admitted 11/3/2023 12:36 PM     Presenting problem and precipitating factors: Pt was admitted under a TDO due to psychosis. Pt has a hx of schizoaffective disorder. Pre-screen reports pt has increase in paranoia and delusions that have increased over the past three weeks. Pt likely medication non-compliant. Pt preoccupied about her utilities being tampered with and radiation from her microwave. Mental status assessment: Pt presents as guarded and paranoid. Pt not wanting to engage in PSA at this time. Pt denies SI, HI, AVH. Pt alert and oriented x4. Pt has little insight into her mental health and medication management at this time. Strengths/Recreation/Coping Skills: Unknown at this time. Writer will gather additional information when pt more oriented and willing to engage. Collateral information: Writer called and left a message with Trena Miles, pt's  at [employer]. Current psychiatric /substance abuse providers and contact info: [place] [id number]    Previous psychiatric/substance abuse providers and response to treatment: Pt reports previous hospitalizations in the past but not details known. Family history of mental illness or substance abuse: None reported. Substance abuse history:    Social History     Tobacco Use    Smoking status: Never    Smokeless tobacco: Never    Tobacco comments:     Pt non smoker   Substance Use Topics    Alcohol use: No       History of biomedical complications associated with substance abuse: None reported. Patient's current acceptance of treatment or motivation for change: Pt has minimal engagement at this time. Pt has little insight into her mental health. Family constellation: Pt has four brothers and four sisters. [place] early in her life. Is significant other involved? No    Describe support system: None reported at this time.     Describe living

## 2023-11-07 LAB — TSH SERPL DL<=0.05 MIU/L-ACNC: 3.39 UIU/ML (ref 0.45–4.5)

## 2023-11-07 PROCEDURE — 1240000000 HC EMOTIONAL WELLNESS R&B

## 2023-11-07 PROCEDURE — 6370000000 HC RX 637 (ALT 250 FOR IP): Performed by: PSYCHIATRY & NEUROLOGY

## 2023-11-07 RX ORDER — QUETIAPINE FUMARATE 200 MG/1
200 TABLET, FILM COATED ORAL NIGHTLY
Status: DISCONTINUED | OUTPATIENT
Start: 2023-11-07 | End: 2023-11-10 | Stop reason: HOSPADM

## 2023-11-07 RX ADMIN — HYDROXYZINE HYDROCHLORIDE 50 MG: 50 TABLET, FILM COATED ORAL at 10:51

## 2023-11-07 RX ADMIN — CITALOPRAM HYDROBROMIDE 10 MG: 20 TABLET ORAL at 08:06

## 2023-11-07 RX ADMIN — QUETIAPINE FUMARATE 200 MG: 200 TABLET ORAL at 21:38

## 2023-11-07 NOTE — PROGRESS NOTES
B: Pt is resting in room this evening states she is doing okay. Denies suicidal and homicidal thoughts, says she is not feeling anxious or depressed. Talked with her sister this evening and tolerated call well. Eating and sleeping well, compliant with meds. Attended and participated in groups. Safe on unit. I: Maintain a safe environment for pt, safety cks q 15 mins and prn. Give meds as ordered, encourage groups. R: Pt is cooperative with assessment, states she is doing okay just feeling tired this evening. Denies suicidal and homicidal thought. Safe on unit. P: Continue to monitor, give meds as ordered, encourage groups.

## 2023-11-07 NOTE — BH NOTE
Pt. Came up to nurses station, during group, asking if there are anymore groups. Writer explained to pt. There was a group going on at this time, and she was welcomed to go back into the day room to join group, pt. Stated aggressively, \" Well shouldn't we both go to group, not just me\". Writer explained, yes, group was for everyone, however, staff cannot make people go if they choose not too, same with her. Pt. Continued to argue about pt. Privacy, hers is important as well as others and pt. States, that this pt. Being told to not stand at the nurses station to listen to everyone's conversations. Writer asked pt. If she would like other pt's knowing her personal business? Pt. States, \" NO\", then walked away from nurses station, stands in hallway beside day room talking out loud. Pt. Restless.

## 2023-11-07 NOTE — GROUP NOTE
Group Therapy Note    Date: 11/7/2023    Group Start Time: 0945  Group End Time: 3434  Group Topic: Community Meeting    [address]         Group Therapy Note    Attendees: 2       Patient's Goal:  REMEMBER HOW TO Treated Unfair    Notes:      Status After Intervention:  Unchanged    Participation Level:  Active Listener    Participation Quality: Attentive      Speech:  normal      Thought Process/Content: Logical      Affective Functioning: Congruent      Mood: anxious      Level of consciousness:  Alert      Response to Learning: Able to verbalize current knowledge/experience      Endings: None Reported    Modes of Intervention: Support      Discipline Responsible: [address] [facility]      Signature:  Jayjay Walker

## 2023-11-07 NOTE — GROUP NOTE
Group Therapy Note    Date: 11/7/2023    Group Start Time: 1100  Group End Time: 7113  Group Topic: Nursing    [facility]    Marie Santiago LPN        Group Therapy Note    Attendees: 2/2       Patient's Goal:  people quit telling on her    Notes:  Medication compliance    Status After Intervention:  Unchanged    Participation Level:  Active Listener    Participation Quality: Appropriate      Speech:  normal      Thought Process/Content: Logical      Affective Functioning: Congruent      Mood: euthymic      Level of consciousness:  Alert, Oriented x4, and Attentive      Response to Learning: Capable of insight and Progressing to goal      Endings: None Reported    Modes of Intervention: Education      Discipline Responsible: Licensed Practical Nurse      Signature:  Jareth Macdonald LPN

## 2023-11-07 NOTE — BH NOTE
Pt. Restless, focusing on water bill, people being bad to her in hallway , rocking. Writer asked pt. If she wanted something for anxiety. Pt.. states, \" I guess\". PRN Atarax given. Writer explained to pt. All the staff has listened to her concerns, we understand what  her concerns and will do what we can to help, her however, she needs to do her part and not dwell on it so much, go focus on somehting else, distract herself, like the tv, reading, etc.  Writer explained to pt. If she wants to get home and take care of issues, she needs to calm down, and get better so she can do so. Pt. Stated, \"okay\", and went to the day room. Sitting quietly in day room at present time.

## 2023-11-07 NOTE — GROUP NOTE
Group Therapy Note    Date: 11/7/2023    Group Start Time: 1245  Group End Time: 36  Group Topic: Psychoeducation    [facility] [place], [facility]        Group Therapy Note    Attendees: 2/2    Writer facilitated an inpatient psychoeducation group regarding trauma. Writer provided a handout and provided education regarding the symptoms of trauma and various treatment options. Writer encouraged patients to ask questions and provide input and feedback. Writer concluded session with a grounding exercise. Patient's Goal:  To attend and participate in groups and activities daily. Notes:  Pt actively participated. Pt was able to ask questions about various things such as PTSD. Status After Intervention:  Improved    Participation Level:  Active Listener and Interactive    Participation Quality: Appropriate, Attentive, and Sharing      Speech:  normal      Thought Process/Content: Logical  Linear      Affective Functioning: Congruent      Mood: euthymic      Level of consciousness:  Alert, Oriented x4, and Attentive      Response to Learning: Able to verbalize current knowledge/experience, Capable of insight, and Progressing to goal      Endings: None Reported    Modes of Intervention: Education      Discipline Responsible: /Counselor      Signature:  [employer]

## 2023-11-07 NOTE — PROGRESS NOTES
B:   Patient alert and oriented x 4. Pt. States depression is 0. Pt. States Anxiety is 5. Pt. denies Hallucinations. Pt. has Delusion, of paranoia. Pt. denies SI.  Pt. denies HI. Pt. cooperative with Assessment. Pt.'s behavior Anxious. Standing in hallway, when nurses station has several people in it, has to be redirected to her room or day room and not to stand around nurses station. I:    If patient is disoriented, reorient pt. Build trust with patient, by therapeutic listening and Groups. Encourage pt. To attend and Participate in Groups. Provide Medications as ordered and needed. Encourage pt. To be up for all meals and snacks, and consume all of each. Encourage pt. To interact with staff and peers in a positive manner. Encourage pt. To keep good hygiene. Q 15 minute safety checks. R:   Pt. did attend and Participate in Group. Pt. Is Compliant with Medications   Yes. Pt. is getting up for meals and snacks. Pt. Consumes 50% of Meals. Pt. is not, interacting with Peers. Pt.'s hygiene is Good. Pt. does not, have any safety issues. P:   Pt. Will develop and continue to utilize positive Coping skills. Pt. Will continue to comply with Plan of Care toward Discharge. Pt. Will continue to stay safe on the unit.

## 2023-11-07 NOTE — PROGRESS NOTES
B: Pt is out of room this evening, denies suicidal and homicidal thoughts. States she does not  have delusions and is not paranoid. 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 States she feels upset and is feels \"a little depressed. Encouraged pt to rest and take her meds, eat her meals and attend groups offered here. Pt talked with her brother at phone time and seemed much calmer after conversation. Safe on unit. I: Maintain a safe environment for pt, safety cks q 15 mins and prn. Give meds as ordered, encourage groups. R: Pt is cooperative with assessment, states she is okay this evening. Encouraged to rest and take her meds as ordered. Safe on unit. P: Continue to monitor, give meds as ordered, encourage groups.

## 2023-11-07 NOTE — BH NOTE
BEHAVIORAL HEALTH GROUP NOTE FOR NON ATTENDEES        DATE: 11/6/2023      GROUP START: 2000    GROUP END: 2045          GROUP TYPE: Wrap-Up        ATTENDANCE: Refused to participate          SIGNATURE:  Marcelo Wallace CNA     Patient said she didn't want to attend group

## 2023-11-07 NOTE — BH NOTE
Behavioral Health Treatment Team Note     Patient goal(s) for today: Pt reports \"Talk to my . \"  Treatment team focus/goals: medication management, safe discharge planning, attend groups and activities daily    Progress note: Pt observed walking the unit. Pt hyperfixated on discharge and water bill. Writer explained to pt that her D19 , Darcie Willis, is not back until Wednesday and that a phone session could be had at that time to discuss discharge. Pt alert and oriented x4. Pt continues to have delusions. Pt denies SI, HI, AVH. Pt continues to display limited insight into her mental health. Inpatient level of care is needed in order to stabilize pt further on medications. LOS:  4  Expected LOS: TDO up to 30 days     Insurance info/prescription coverage:  [place] [employer]  Date of last family contact:  Pt has not signed release, has been speaking to some family members.   Family requesting physician contact today:  No  Discharge plan:  Home with outpatient   Guns in the home:  No  Outpatient provider(s):  [place] [id number]    Participating treatment team members: Alphonse Martinez, [facility]

## 2023-11-07 NOTE — GROUP NOTE
Group Therapy Note    Date: 11/7/2023    Group Start Time: 1320  Group End Time: 7579  Group Topic: Process Group - Inpatient    [facility] [place], [address] [place] [address]        Group Therapy Note    Attendees: 1/2    Writer facilitated an inpatient processing group. Writer implemented various expressive arts activities and held the space as patients processed. Writer encouraged patients to express any feelings, discuss discharge plans, etc. Writer concluded session with a grounding exercise and encouraging patients to provide input and feedback regarding the group. Patient's Goal:  To attend and participate in groups and activities daily. Notes:  Pt actively participated. Pt was able to discuss discharge plans and was able to discuss coping skills such as going to her Hindu meetings and music. Status After Intervention:  Improved    Participation Level:  Active Listener and Interactive    Participation Quality: Appropriate, Attentive, and Sharing      Speech:  normal      Thought Process/Content: Logical  Linear      Affective Functioning: Congruent      Mood: euthymic      Level of consciousness:  Alert, Oriented x4, and Attentive      Response to Learning: Able to retain information, Capable of insight, and Progressing to goal      Endings: None Reported    Modes of Intervention: Exploration and Activity      Discipline Responsible: /Counselor      Signature:  [facility]iner[facility]

## 2023-11-08 PROCEDURE — 6370000000 HC RX 637 (ALT 250 FOR IP): Performed by: PSYCHIATRY & NEUROLOGY

## 2023-11-08 PROCEDURE — 1240000000 HC EMOTIONAL WELLNESS R&B

## 2023-11-08 RX ADMIN — CITALOPRAM HYDROBROMIDE 10 MG: 20 TABLET ORAL at 08:15

## 2023-11-08 RX ADMIN — QUETIAPINE FUMARATE 200 MG: 200 TABLET ORAL at 20:35

## 2023-11-08 NOTE — GROUP NOTE
Group Therapy Note    Date: 11/8/2023    Group Start Time: 4631  Group End Time: 1652  Group Topic: Process Group - Inpatient    [facility]    Jeniffer Hung        Group Therapy Note    Attendees: 2/3    Writer facilitated an inpatient processing group. Writer implemented mindfulness and expressive arts activities and music. Writer held the space as patients processed. Writer encouraged patients to discuss discharge plans, express any feelings they may be having, etc. Writer concluded session with a grounding exercise. Patient's Goal:  To attend and participate in groups and activities daily. Notes:  Pt actively participated. Pt was able to discuss discharge plans and discussed talking with family members. Pt also discussed more hobbies. Status After Intervention:  Improved    Participation Level:  Active Listener and Interactive    Participation Quality: Appropriate, Attentive, and Sharing      Speech:  normal      Thought Process/Content: Logical  Linear      Affective Functioning: Congruent      Mood: euthymic      Level of consciousness:  Alert, Oriented x4, and Attentive      Response to Learning: Able to verbalize/acknowledge new learning, Able to retain information, Capable of insight, and Progressing to goal      Endings: None Reported    Modes of Intervention: Exploration and Activity      Discipline Responsible: /Counselor      Signature:  [facility]

## 2023-11-08 NOTE — GROUP NOTE
Group Therapy Note    Date: 11/8/2023    Group Start Time: 0945  Group End Time: 1030  Group Topic: Community Meeting    [address], Magdalena Barrios RN        Group Therapy Note    Attendees: 3/3       Patient's Goal:  To establish a goal for the day    Notes:  Goal setting     Status After Intervention:  Improved    Participation Level: Minimal    Participation Quality: Appropriate      Speech:  normal      Thought Process/Content: Logical      Affective Functioning: Congruent      Mood: euthymic      Level of consciousness:  Alert      Response to Learning: Able to verbalize current knowledge/experience      Endings: None Reported    Modes of Intervention: Reality-testing      Discipline Responsible: Registered Nurse      Signature:  Lisa Browning RN

## 2023-11-08 NOTE — BH NOTE
Behavioral Health Treatment Team Note     Patient goal(s) for today: Pt reports \"Work on going home. \"  Treatment team focus/goals: medication management, safe discharge planning, attend groups and activities daily    Progress note: Pt observed seated in her bedroom. Pt more pleasant and less irritable today. Pt alert and oriented x4. Pt denies SI, HI, AVH. Pt denies paranoia and was able to talk in a more rational manner regarding getting her water bill situation looked at. Inpatient level of care is needed in order to stabilize pt further on medications. Collateral: Hilary Saravia from [employer], pt's , contacted the unit to verify medications. Writer left a message with Hilary Saravia to coordinate a phone support session for tomorrow for pt to establish baseline and a discharge plan. LOS:  5  Expected LOS: TDO up to 30 days    Insurance info/prescription coverage:  [employer]  Date of last family contact:  Pt has not signed a release.    Family requesting physician contact today:  No  Discharge plan:  Home with outpatient   Guns in the home:  No  Outpatient provider(s):  [place] [address]    Participating treatment team members: Arlen [place], [facility]

## 2023-11-08 NOTE — BH NOTE
BEHAVIORAL HEALTH GROUP NOTE FOR NON ATTENDEES        DATE: 11/7/2023      GROUP START: 2000    GROUP END: 2045          GROUP TYPE: Wrap-Up        ATTENDANCE: Refused to participate          SIGNATURE:  Johana Mccord CNA         Ms. Mikal Bergeron didn't participate In group

## 2023-11-08 NOTE — GROUP NOTE
Group Therapy Note    Date: 11/8/2023    Group Start Time: 1410  Group End Time: 5644  Group Topic: Psychoeducation    [facility]    Fabi Hung        Group Therapy Note    Attendees: 2/3    Writer facilitated a psych-ed group regarding strengths and their various uses. Writer provided a handout which discussed a variety of strengths. Writer encouraged patients to discuss what strengths they feel they have and what strengths they want to improve on. Writer concluded session with a grounding exercise and encouraging patients to provide input and feedback regarding the group. Patient's Goal:  To attend and participate in groups and activities daily. Notes:  Pt actively participated. Pt was able to identify several strengths and ones she wants to improve on. 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 Status After Intervention:  Improved    Participation Level:  Active Listener and Interactive    Participation Quality: Appropriate, Attentive, and Sharing      Speech:  normal      Thought Process/Content: Logical  Linear      Affective Functioning: Congruent      Mood: euthymic      Level of consciousness:  Alert, Oriented x4, and Attentive      Response to Learning: Able to verbalize/acknowledge new learning, Able to retain information, Capable of insight, and Progressing to goal      Endings: None Reported    Modes of Intervention: Education      Discipline Responsible: /Counselor      Signature:  [facility]

## 2023-11-08 NOTE — BH NOTE
TREATMENT TEAM COMPLETED     Attending meeting was as follows:  Patient, SARINA Velasquez, Writer, and Dr. Paul Ocasio. Meeting was conducted via Real-time video conference      Daily Treatment Team consists of the following:     Pt. Cognitive status:  Awake    Pt. Attending Groups: Yes    Pt. Participating in groups:  Yes    Pt. Homicidal / Suicidal: normal    Pt. Behaviors:  Cooperative and Pleasant    Pt.  Compliant with Medications:  Yes          New Medication orders:  No      Discharge Plan:  Home with outpatient follow-up

## 2023-11-08 NOTE — GROUP NOTE
Group Therapy Note    Date: 11/8/2023    Group Start Time: 1100  Group End Time: 0990  Group Topic: Activity    [facility] [address], Raymond Mortimer, RN        Group Therapy Note    Attendees: 3/3       Patient's Goal:  Stay out of bed for a while and exist in social situation.     Notes:  n/a    Status After Intervention:  Unchanged    Participation Level: Minimal    Participation Quality: Attentive      Speech:  normal      Thought Process/Content: Logical      Affective Functioning: Congruent      Mood: euthymic      Level of consciousness:  Attentive      Response to Learning: Able to retain information      Endings: None Reported    Modes of Intervention: Socialization, Activity, Media, and Reality-testing      Discipline Responsible: Registered Nurse      Signature:  Shirley George RN

## 2023-11-08 NOTE — BH NOTE
B: Pt is alert and oriented x4. Pt is cooperative with assessments. Pt reports feeling \"I'm fine\". Pt states they are here because of her water bill, but her sister, Stephanie Srinivasan, is asking someone to look into it. Pt identifies they're doing better since their admission. Pt is able to identify personal coping alternatives: reading, going to [employer], watching documentaries or dramas on T.V.  No s/s of distress noted. No complaints of pain. I: Assess Pt mood. Document presence of depressive/anxiety symptoms. Assess for Audio/visual hallucinations. Establish trust.  Educate Pt on medications and disease processes. Monitor ADLs, food/fluid consumption and sleep. Encouarge group participation and socialization. Educate Pt on medications, coping alternatives, disease processes, and community resources. Safety checks. R: Pt mood is stable. Pt rates depression 0/10, identifies triggers as none. Pt rates anxiety at 0/10, identifies triggers as none. Pt denies SI. Pt denies HI. Pt denies audio/visual hallucinations, s/s are not observed by staff. Pt has not spoken about the waterbill at all today, unless asked specifically. Pt is attending groups and is interacting appropriately with staff/peers. Pt is eating all meals, and is maintaining their personal hygiene. Pt reports sleeping well. Pt is compliant wiith medications. P: Encourage group participation and socialization.

## 2023-11-08 NOTE — GROUP NOTE
Group Therapy Note    Date: 11/8/2023    Group Start Time: 1500  Group End Time: 3785  Group Topic: Nursing    [facility] [address], Douglas Flores RN        Group Therapy Note    Attendees: 3/3       Patient's Goal:  Program Schedule on the unit    Notes:  What to do and when    Status After Intervention:  Improved    Participation Level: Interactive    Participation Quality: Appropriate      Speech:  normal      Thought Process/Content: Logical      Affective Functioning: Flat      Mood: euthymic      Level of consciousness:  Oriented x4      Response to Learning: Able to verbalize/acknowledge new learning      Endings: None Reported    Modes of Intervention: Clarifying and Problem-solving      Discipline Responsible: Registered Nurse      Signature:  Ernestine Leyva RN

## 2023-11-09 PROCEDURE — 1240000000 HC EMOTIONAL WELLNESS R&B

## 2023-11-09 PROCEDURE — 6370000000 HC RX 637 (ALT 250 FOR IP): Performed by: PSYCHIATRY & NEUROLOGY

## 2023-11-09 RX ORDER — CITALOPRAM 20 MG/1
20 TABLET ORAL DAILY
Qty: 30 TABLET | Refills: 0 | Status: SHIPPED | OUTPATIENT
Start: 2023-11-09 | End: 2023-12-09

## 2023-11-09 RX ORDER — QUETIAPINE FUMARATE 200 MG/1
200 TABLET, FILM COATED ORAL NIGHTLY
Qty: 30 TABLET | Refills: 0 | Status: SHIPPED | OUTPATIENT
Start: 2023-11-10 | End: 2023-12-10

## 2023-11-09 RX ADMIN — CITALOPRAM HYDROBROMIDE 10 MG: 20 TABLET ORAL at 08:28

## 2023-11-09 RX ADMIN — QUETIAPINE FUMARATE 200 MG: 200 TABLET ORAL at 20:57

## 2023-11-09 NOTE — BH NOTE
B:   Patient alert and oriented x 4. Pt. States depression is 0/10. Pt. States Anxiety is 0/10. Pt. denies Hallucinations. Pt. denies Delusions. Pt. denies SI.  Pt. denies HI. Pt. cooperative with Assessment. Pt.'s behavior Cooperative. I:    If patient is disoriented, reorient pt. Build trust with patient, by therapeutic listening and Groups. Encourage pt. To attend and Participate in Groups. Provide Medications as ordered and needed. Encourage pt. To be up for all meals and snacks, and consume all of each. Encourage pt. To interact with staff and peers in a positive manner. Encourage pt. To keep good hygiene. Q 15 minute safety checks. R:   Pt. did attend and Participate in Group. Pt. Is Compliant with Medications   Yes. Pt. is getting up for meals and snacks. Pt. Consumes 100% of Meals. Pt. is not, interacting with Peers. Pt.'s hygiene is Good. Pt. does not, have any safety issues. P:   Pt. Will develop and continue to utilize positive Coping skills. Pt. Will continue to comply with Plan of Care toward Discharge. Pt. Will continue to stay safe on the unit.

## 2023-11-09 NOTE — BH NOTE
Medicaid cab phone: [phone]    Cab scheduled for 11/10/23 10:00 am for discharge home    Trip ID: [id number]

## 2023-11-09 NOTE — BH NOTE
Behavioral Health Treatment Team Note     Patient goal(s) for today: Pt reports \"To get ready for tomorrow. \"  Treatment team focus/goals: medication management, safe discharge planning, attend groups and activities daily    Progress note: Pt observed seated in her room. Pt alert and oriented x4. Pt denies SI, HI, AVH. Pt does not present as paranoid and delusional and is ready for d/c home tomorrow. Inpatient level of care is needed in order to stabilize pt further on medications. Collateral: Writer facilitated a support session with pt's , Abhay Escalante from [employer]. No concerns noted for pt's discharge tomorrow. Abhay Escalante and this writer discussed with pt the importance of continuing medication and not stopping medication abruptly. LOS:  6  Expected LOS: TDO up to 30 days    Insurance info/prescription coverage:  [employer]  Date of last family contact:  Pt has been talking to sister.  Spoke with  today  Family requesting physician contact today:  No  Discharge plan:  Home with outpatient,  Guns in the home:  No  Outpatient provider(s):  [place] [id number]    Participating treatment team members: Monico Dooley [facility]

## 2023-11-09 NOTE — PLAN OF CARE
Problem: Risk for Elopement  Goal: Patient will not exit the unit/facility without proper excort  Outcome: Progressing     Problem: Discharge Planning  Goal: Discharge to home or other facility with appropriate resources  Outcome: Progressing     Problem: Safety - Adult  Goal: Free from fall injury  Outcome: Progressing     Problem: ABCDS Injury Assessment  Goal: Absence of physical injury  Outcome: Progressing     Problem: Chloe  Goal: Will exhibit normal sleep and speech and no impulsivity  Description: INTERVENTIONS:  1. Administer medication as ordered  2. Set limits on impulsive behavior  3. Make attempts to decrease external stimuli as possible  Outcome: Progressing     Problem: Psychosis  Goal: Will report no hallucinations or delusions  Description: INTERVENTIONS:  1. Administer medication as  ordered  2. Assist with reality testing to support increasing orientation  3. Assess if patient's hallucinations or delusions are encouraging self harm or harm to others and intervene as appropriate  Outcome: Progressing     Problem: Behavior  Goal: Pt/Family maintain appropriate behavior and adhere to behavioral management agreement, if implemented  Description: INTERVENTIONS:  1. Assess patient/family's coping skills and  non-compliant behavior (including use of illegal substances)  2. Notify security of behavior or suspected illegal substances which indicate the need for search of the family and/or belongings  3. Encourage verbalization of thoughts and concerns in a socially appropriate manner  4. Utilize positive, consistent limit setting strategies supporting safety of patient, staff and others  5. Encourage participation in the decision making process about the behavioral management agreement  6. If a visitor's behavior poses a threat to safety call refer to organization policy.   7. Initiate consult with , Psychosocial CNS, Spiritual Care as appropriate  Outcome: Progressing     Problem:

## 2023-11-09 NOTE — BH NOTE
Phone support session with [facility] , [facility], scheduled for today at 3:00 pm.

## 2023-11-09 NOTE — BH NOTE
DISCHARGE SUMMARY    NAME:So Perez  : 1960  MRN: [id number]    The patient Mayur Solo exhibits the ability to control behavior in a less restrictive environment. Patient's level of functioning is improving. No assaultive/destructive behavior has been observed for the past 24 hours. No suicidal/homicidal threat or behavior has been observed for the past 24 hours. There is no evidence of serious medication side effects. Patient has not been in physical or protective restraints for at least the past 24 hours. If weapons involved, how are they secured? N/A    Is patient aware of and in agreement with discharge plan? Yes    Arrangements for medication:  Prescriptions On file    Copy of discharge instructions to provider?:  Yes    Arrangements for transportation home:  Pt will be taken home via cab. Keep all follow up appointments as scheduled, continue to take prescribed medications per physician instructions.   Mental health crisis number:  191 or your local mental health crisis line number at [facility] Emergency WARM LINE      4-943-948-MHAV (2642)      M-F: 9am to 9pm      Sat & Sun: 5pm - 9pm  National suicide prevention lines:                             1-663-ZKQCSQX (3-478-599-7346)       8-689-042-TALK ([phone])    Crisis Text Line:  Text HOME to 848253

## 2023-11-09 NOTE — GROUP NOTE
Group Therapy Note    Date: 11/9/2023    Group Start Time: 1320  Group End Time: 1400  Group Topic: Process Group - Inpatient    [facility]    AnabellCecilia anguloolph        Group Therapy Note    Attendees: 2/4    Writer facilitated an inpatient processing group. Writer had patients engage in an activity titled \"Personal Shield\" in which patients were asked to identify protective factors in their life. Writer had patients engage in the expressive arts activity and held the space as patients processed. Writer encouraged patients to discuss discharge plans, express any feelings they may be having, etc. Writer concluded session with mindfulness and a grounding exercise. Patient's Goal:  To attend and participate in groups and activities daily     Notes:  Pt actively participated. Pt was able to identify supports in her life and protective factors. Status After Intervention:  Improved    Participation Level:  Active Listener and Interactive    Participation Quality: Appropriate, Attentive, Sharing, and Supportive      Speech:  normal      Thought Process/Content: Logical  Linear      Affective Functioning: Congruent      Mood: euthymic      Level of consciousness:  Alert, Oriented x4, and Attentive      Response to Learning: Able to verbalize current knowledge/experience, Able to retain information, Capable of insight, and Progressing to goal      Endings: None Reported    Modes of Intervention: Exploration and Activity      Discipline Responsible: /Counselor      Signature:  [employer]

## 2023-11-09 NOTE — BH NOTE
Will stop at following stop tomorrow prior to d/c home:    [facility]  [address] [facility], [address] [employer] cab phone: [phone]     Cab scheduled for 11/10/23 10:00 am for discharge home     Trip ID: [id number]

## 2023-11-09 NOTE — BH NOTE
TREATMENT TEAM COMPLETED     Attending meeting was as follows:  Patient, Dr. Keegan Berg, RN Unit Manager, Grady Orta, Charge LPN and  Uziel Guerin, Licensed Clinical Therapist.        Meeting was conducted via [employer]      Daily Treatment Team consists of the following:     Pt. Cognitive status:  Alert and Oriented x 4. Pt. Attending Groups: Yes    Pt. Participating in groups:  Yes    Pt. Homicidal / Suicidal: Denies    Pt. Behaviors:  Patient has shown much improvement since admission. Patient can talk calmly about her water bill and how she is going to address the situation with her water bill. Patient has been coming to groups and has not been standing at nurses station listening to staff as she was when she first came to this unit. Less paranoid. Patient is at baseline and stated she knew she needed to take her medications or she would be back in the hospital again. Pt. Compliant with Medications:  Yes          New Medication orders:  No      Discharge Plan:  Discharge home tomorrow after phone session with  from TD -23 Lella Riedel). Patient is glad to be going home.

## 2023-11-09 NOTE — BH NOTE
Pt received in bed awake, did not attend group   refused  eat snack . Upon assessment . Pt alert and oriented x 4 denies SI/HI, denies pain, denies anxiety or depression. No delusional statement made  by pt . Pt accepted HS medication. Drank 8 oz water . Pt start sleeping by 2100 ,remained sleeping as of this time.      No violent no self harming behavior noticed or reported

## 2023-11-09 NOTE — GROUP NOTE
Group Therapy Note    Date: 11/9/2023    Group Start Time: 1400  Group End Time: 1440  Group Topic: Psychoeducation    [facility]    Huber Hung        Group Therapy Note    Attendees: 2/4    Writer facilitated an inpatient psych-ed group regarding boundaries. Writer provided a handout about the different types of boundaries to include Rigid, Porous, and Healthy and provided examples. Therapeutic purpose of the group was to have patients discuss ways they can set healthy boundaries and practice assertive communication. Writer encouraged patients to share. Writer concluded session with a grounding exercise and encouraging patients to provide input and feedback regarding the group. Patient's Goal:  To attend and participate in groups and activities daily. Notes:  Pt actively participated. Pt asked questions about boundaries and was able to disclose that she used to be a \"pushover\" in school and needed to work on being more assertive with her neighbors. Status After Intervention:  Improved    Participation Level:  Active Listener and Interactive    Participation Quality: Appropriate, Attentive, and Sharing      Speech:  normal      Thought Process/Content: Logical  Linear      Affective Functioning: Congruent      Mood: euthymic      Level of consciousness:  Alert, Oriented x4, and Attentive      Response to Learning: Able to verbalize/acknowledge new learning, Able to retain information, Capable of insight, and Progressing to goal      Endings: None Reported    Modes of Intervention: Education      Discipline Responsible: /Counselor      Signature:  Vanessa [employer]

## 2023-11-09 NOTE — GROUP NOTE
Group Therapy Note    Date: 11/9/2023    Group Start Time: 0845  Group End Time: 0930  Group Topic: Community Meeting    [facility] Inna Simpson, [facility]        Group Therapy Note    Attendees: 4        Subject matter:  Handling Frustration    Patient's Goal:   To get through the day    Notes:   Slept 7 hours -ate more than 1/2 her meals - pt. Feels  she is making progress -  felt she handled    general day to day issues here in [facility] -  Did not have any thoughts of self harm  Noted  medication side effects  but declined to indicate what they were. Did not have any issues she wished to speak to  doctors about. Patient was able to  provide clear insight of subject matter. Was able to provide understanding of subject matter and consequences  ( Subject : Frustration)     Status After Intervention:  Improved    Participation Level:  Active Listener and Interactive    Participation Quality: Appropriate, Attentive, and Sharing      Speech:  normal      Thought Process/Content: Logical      Affective Functioning: Congruent      Mood:  relaxed      Level of consciousness:  Alert      Response to Learning: Able to verbalize current knowledge/experience      Endings: None Reported    Modes of Intervention: Support      Discipline Responsible: [address] [facility]      Signature:  Saida Barahona

## 2023-11-10 VITALS
OXYGEN SATURATION: 99 % | DIASTOLIC BLOOD PRESSURE: 73 MMHG | TEMPERATURE: 96.9 F | RESPIRATION RATE: 16 BRPM | SYSTOLIC BLOOD PRESSURE: 133 MMHG | HEART RATE: 81 BPM | HEIGHT: 67 IN | BODY MASS INDEX: 26.68 KG/M2 | WEIGHT: 170 LBS

## 2023-11-10 PROCEDURE — 6370000000 HC RX 637 (ALT 250 FOR IP): Performed by: PSYCHIATRY & NEUROLOGY

## 2023-11-10 RX ADMIN — CITALOPRAM HYDROBROMIDE 10 MG: 20 TABLET ORAL at 08:41

## 2023-11-10 NOTE — BH NOTE
Pt received in hallway and then went to activity room waiting to make phone call,  but sister called her and she talked to ramiro on the phone, noticed to be talking little tense and loud.  , Pt did not attend group   refused  to  eat snack . Upon assessment . Pt alert and oriented x 4 denies SI/HI, denies pain, denies anxiety or depression. No paranoid or  delusional statement made  by pt . Pt accepted HS medication. Drank 4 oz water . Pt start sleeping by 2130  ,remained sleeping as of this time.      No violent no self harming behavior noticed or reported

## 2023-11-10 NOTE — BH NOTE
Behavioral Health Transition Record to Provider    Patient Name: Vahid Flores  YOB: 1960  Medical Record Number: [id number]  Date of Admission: 11/3/2023  Date of Discharge: 11/10/2023    Attending Provider: Alirio Lim MD  Discharging Provider: Alirio Lim MD  To contact this individual call [phone] and ask the  to page. If unavailable, ask to be transferred to [facility] Provider on call. [facility] Provider will be available on call 24/7 and during holidays. Primary Care Provider: Coretha Dance, MD    Allergies   Allergen Reactions    Gluten Nausea And Vomiting       Reason for Admission: worsening paranoid schizophrenia symptoms over the past few weeks. Patient denies any suicidal or homicidal ideations. She states that she is angry because her water bill was over $300. She does not seem particularly delusional however it is difficult to tell if what she is currently stating is true or not. Was placed under [employer] by D-[employer]. Per [employer] paperwork: Reports of increased delusions and paranoia. Believes others are entering her home and causing harm to her microwave, causing her water bill tob e high . Also believes neighbors and post office are taking her mail and that relatives  and D-19 are 'out to get her.' No taking medications as prescribed.         Admission Diagnosis: Schizoaffective disorder ([address]) [F25.9]  Paranoid schizophrenia ([address] ) [F20.0]    * No surgery found *    Results for orders placed or performed during the hospital encounter of 11/03/23   COVID-19 & Influenza Combo    Specimen: Nasopharyngeal   Result Value Ref Range    SARS-CoV-2, PCR Not Detected Not Detected      Rapid Influenza A By PCR Not Detected Not Detected      Rapid Influenza B By PCR Not Detected Not Detected     CBC with Auto Differential   Result Value Ref Range    WBC 5.2 3.6 - 11.0 K/uL    RBC 3.58 (L) 3.80 - 5.20 M/uL    Hemoglobin 11.7 11.5 - 16.0

## 2023-11-10 NOTE — BH NOTE
Pt. Belongings given back to pt., verified all there, signed for. Discharge instructions explained to pt. Including, Follow up appt. With [facility] . Medications called into [facility] in [address] [place] [address]. Pt. Denies SI/HI at time of discharge. Pt. is not a Smoker. Smoking cessation education wasprovided. Pt. is not a drinker. Alcohol Education was Provided. Discharge Paperwork and H & P, faxed to [facility], With success sheet. Pt. was discharged on 2 or more Antipsychotics. Pt. Displayed no safety concerns at discharge. Pt. Escorted to front by staff for transportation by taxi to stop x1 at [facility] for med  then, to home.

## 2023-11-10 NOTE — PLAN OF CARE
Problem: Risk for Elopement  Goal: Patient will not exit the unit/facility without proper excort  Outcome: Progressing     Problem: Discharge Planning  Goal: Discharge to home or other facility with appropriate resources  Outcome: Progressing     Problem: Safety - Adult  Goal: Free from fall injury  Outcome: Progressing     Problem: ABCDS Injury Assessment  Goal: Absence of physical injury  Outcome: Progressing  Flowsheets (Taken 11/9/2023 3138 by Sabi Vanessa RN)  Absence of Physical Injury: Implement safety measures based on patient assessment     Problem: Chloe  Goal: Will exhibit normal sleep and speech and no impulsivity  Description: INTERVENTIONS:  1. Administer medication as ordered  2. Set limits on impulsive behavior  3. Make attempts to decrease external stimuli as possible  Outcome: Progressing     Problem: Psychosis  Goal: Will report no hallucinations or delusions  Description: INTERVENTIONS:  1. Administer medication as  ordered  2. Assist with reality testing to support increasing orientation  3. Assess if patient's hallucinations or delusions are encouraging self harm or harm to others and intervene as appropriate  Outcome: Progressing     Problem: Behavior  Goal: Pt/Family maintain appropriate behavior and adhere to behavioral management agreement, if implemented  Description: INTERVENTIONS:  1. Assess patient/family's coping skills and  non-compliant behavior (including use of illegal substances)  2. Notify security of behavior or suspected illegal substances which indicate the need for search of the family and/or belongings  3. Encourage verbalization of thoughts and concerns in a socially appropriate manner  4. Utilize positive, consistent limit setting strategies supporting safety of patient, staff and others  5. Encourage participation in the decision making process about the behavioral management agreement  6.  If a visitor's behavior poses a threat to safety call refer to organization

## 2023-11-10 NOTE — BH NOTE
Pt slept overnight remained sleeping as of this time.      No violent no self harming behavior noticed or reported

## 2023-11-10 NOTE — DISCHARGE SUMMARY
PSYCHIATRIC DISCHARGE SUMMARY         IDENTIFICATION:    Patient Name  Garrett Carey   Date of Birth 1960   [facility] [id number]   Medical Record Number  [id number]      Age  66 y.o. PCP Yoana Field MD   Admit date:  11/3/2023    Discharge date: 11/10/2023   Room Number  105/02  @ [address] [facility]   Date of Service  11/10/2023            TYPE OF DISCHARGE: REGULAR               CONDITION AT DISCHARGE: Improved       PROVISIONAL & DISCHARGE DIAGNOSES:    Admission Diagnoses:   Schizoaffective disorder ([address]) [F25.9]  Paranoid schizophrenia ([address]) [F20.0]    Discharge Diagnoses:      Hospital Problems             Last Modified POA    Paranoid schizophrenia ([address]) 11/6/2023 Yes           CC & HISTORY OF PRESENT ILLNESS:  Per Abeba Escalante admit note: \"The patient, Garrett Carey, is a 66 y.o. Black / [place] American female with a past psychiatric history significant for schizoaffective disorder, who presents at this time with complaints of increased paranoia and delusions that has been worsening over the past 3 weeks. The patient has had a longstanding history of schizoaffective disorder with intermittent inpatient hospitalizations that present in a similar manner. The patient will commonly when decompensated become very paranoid and delusional specifically about a common theme of her water bill where she feels that people are stealing her water and raising her bill. She additionally gets preoccupied with people who she claims are trying to take away her independence and autonomy. She will additionally call multiple public service agencies waiting complaints that are typically unfounded. She reports to live alone in [facility] in [place]. She has been in our facility back in 2018 for similar presentation. She does have intermittent follow-up with [place] [id number].   There is however some question as to whether or not she is compliant with her daily medications 11/04/2023 6.3 (H)  4.0 - 5.6 % Final    eAG 11/04/2023 134  mg/dL Final    LIPID PANEL 11/04/2023      Final    Cholesterol, Total 11/04/2023 178  <200 mg/dL Final    Triglycerides 11/04/2023 40  <150 mg/dL Final    HDL 11/04/2023 90  mg/dL Final    LDL Calculated 11/04/2023 80  0 - 100 mg/dL Final    VLDL Cholesterol Calculated 11/04/2023 8  mg/dL Final    Chol/HDL Ratio 11/04/2023 2.0  0.0 - 5.0   Final    TSH 11/04/2023 3.390  0.450 - 4.500 uIU/mL Final     No results found. DISPOSITION:    Home. Patient to f/u with psychiatric and psychotherapy appointments. Patient is to f/u with internist/PCP as directed in the next 1-2 weeks. They will return to the ED if problems worsen or return. Discharge risk assessment has been performed. FOLLOW-UP CARE:    Activity as tolerated  Regular diet             PROGNOSIS:   Fair --- based on nature of patient's pathology/ies and treatment compliance issues. Prognosis is greatly dependent upon patient's ability follow discharge recommendations, take medications as described, and follow up with scheduled appointments.              DISCHARGE MEDICATIONS:     Informed consent given for the use of following psychotropic medications:     Medication List        CHANGE how you take these medications      citalopram 20 MG tablet  Commonly known as: CELEXA  Take 1 tablet by mouth daily  What changed:   medication strength  how much to take     QUEtiapine 200 MG tablet  Commonly known as: SEROQUEL  Take 1 tablet by mouth nightly  What changed:   medication strength  how much to take  when to take this               Where to Get Your Medications        These medications were sent to [address], [address] [facility]  [address] [facility], [place], [address] [place] [address] Drive      Phone: [phone]   citalopram 20 MG tablet  QUEtiapine 200 MG tablet                A coordinated, multidisplinary treatment team

## 2023-11-13 NOTE — BH NOTE
Follow up: Writer attempted first number on list. Stated number has been changed. Left message on other number.

## 2024-10-28 ENCOUNTER — OFFICE VISIT (OUTPATIENT)
Facility: CLINIC | Age: 64
End: 2024-10-28
Payer: MEDICAID

## 2024-10-28 VITALS
OXYGEN SATURATION: 97 % | SYSTOLIC BLOOD PRESSURE: 136 MMHG | TEMPERATURE: 98.1 F | HEIGHT: 67 IN | DIASTOLIC BLOOD PRESSURE: 74 MMHG | WEIGHT: 183.6 LBS | HEART RATE: 88 BPM | BODY MASS INDEX: 28.82 KG/M2 | RESPIRATION RATE: 18 BRPM

## 2024-10-28 DIAGNOSIS — R73.03 PREDIABETES: ICD-10-CM

## 2024-10-28 DIAGNOSIS — L08.9 SKIN INFECTION: ICD-10-CM

## 2024-10-28 DIAGNOSIS — Z12.31 ENCOUNTER FOR SCREENING MAMMOGRAM FOR MALIGNANT NEOPLASM OF BREAST: ICD-10-CM

## 2024-10-28 DIAGNOSIS — I10 PRIMARY HYPERTENSION: Primary | ICD-10-CM

## 2024-10-28 DIAGNOSIS — F20.0 PARANOID SCHIZOPHRENIA (HCC): ICD-10-CM

## 2024-10-28 PROBLEM — F20.9 SCHIZOPHRENIA (HCC): Status: RESOLVED | Noted: 2021-04-20 | Resolved: 2024-10-28

## 2024-10-28 PROCEDURE — 3075F SYST BP GE 130 - 139MM HG: CPT

## 2024-10-28 PROCEDURE — 3078F DIAST BP <80 MM HG: CPT

## 2024-10-28 PROCEDURE — 99214 OFFICE O/P EST MOD 30 MIN: CPT

## 2024-10-28 RX ORDER — DOXYCYCLINE HYCLATE 100 MG
100 TABLET ORAL 2 TIMES DAILY
Qty: 14 TABLET | Refills: 0 | Status: SHIPPED | OUTPATIENT
Start: 2024-10-28 | End: 2024-11-04

## 2024-10-28 RX ORDER — RISPERIDONE 1 MG/1
1 TABLET ORAL
COMMUNITY
Start: 2024-10-23

## 2024-10-28 ASSESSMENT — ENCOUNTER SYMPTOMS
EYE PAIN: 0
NAUSEA: 0
ABDOMINAL PAIN: 0
BACK PAIN: 0
SORE THROAT: 0
VOMITING: 0
CONSTIPATION: 0
DIARRHEA: 0
SINUS PAIN: 0
BLOOD IN STOOL: 0
RHINORRHEA: 0

## 2024-10-28 NOTE — PROGRESS NOTES
So Daniels is a 64 y.o. female who presents to the office today for the following:    Chief Complaint   Patient presents with    New Patient    Establish Care    Insect Bite     Right arm       Past Medical History:   Diagnosis Date    Bug bite     scattered on back legs forarms    Constipation     Diarrhea     Pancreatitis, chronic (HCC)     pt reported having chronic pancreatitis        Past Surgical History:   Procedure Laterality Date    CHOLECYSTECTOMY  07/27/2020    GI  07/26/2020    ERCP w/ biliary stent placement for duct stones;         Family History   Problem Relation Age of Onset    Diabetes Brother     Cancer Sister         colon    Kidney Disease Mother     Cancer Maternal Aunt     Heart Attack Brother     Heart Failure Sister     Asthma Sister     Heart Disease Sister         Social History     Tobacco Use    Smoking status: Never    Smokeless tobacco: Never    Tobacco comments:     Pt non smoker   Vaping Use    Vaping status: Never Used   Substance Use Topics    Alcohol use: No    Drug use: No        HPI  Patient here to establish as new patient with provider. Follows with [facility]. PMH paranoid schizophrenia, HTN, prediabetes and CAD. Briefly followed with Lauren in 2021. Appears patient had cardiac catheterization showing trivial CAD per cards report. Denies chest pain, SOB, dizziness, palpitations. Colonoscopy, mammogram and PAP are not up to date. Hospitalized 7 days 11/2023 for mental health.    She has complaint of possible infection of right forearm wound. Patient has hx skin picking with many discolored areas of scarring to arms and legs visible in office today. Some surrounding redness and scant purulent drainage of wound. No fever body aches or chills.     Chief Complaint   Patient presents with    New Patient    Establish Care    Insect Bite     Right arm       Current Outpatient Medications on File Prior to Visit   Medication Sig Dispense Refill    [email or web address]

## 2024-10-28 NOTE — PROGRESS NOTES
Chief Complaint   Patient presents with    New Patient    Establish Care    Insect Bite     Right arm     \"Have you been to the ER, urgent care clinic since your last visit?  Hospitalized since your last visit?\"    NO    “Have you seen or consulted any other health care providers outside our system since your last visit?”    NO    Have you had a mammogram?”   NO    Date of last Mammogram: 12/5/2013      “Have you had a pap smear?”    NO    No cervical cancer screening on file       “Have you had a colorectal cancer screening such as a colonoscopy/FIT/Cologuard?    NO    No colonoscopy on file  No cologuard on file  No FIT/FOBT on file   No flexible sigmoidoscopy on file

## 2024-10-29 LAB
ALBUMIN SERPL-MCNC: 4.2 G/DL (ref 3.9–4.9)
ALP SERPL-CCNC: 101 IU/L (ref 44–121)
ALT SERPL-CCNC: 20 IU/L (ref 0–32)
APPEARANCE UR: CLEAR
AST SERPL-CCNC: 22 IU/L (ref 0–40)
BACTERIA #/AREA URNS HPF: NORMAL /[HPF]
BASOPHILS # BLD AUTO: 0 X10E3/UL (ref 0–0.2)
BASOPHILS NFR BLD AUTO: 1 %
BILIRUB SERPL-MCNC: 0.3 MG/DL (ref 0–1.2)
BILIRUB UR QL STRIP: NEGATIVE
BUN SERPL-MCNC: 7 MG/DL (ref 8–27)
BUN/CREAT SERPL: 8 (ref 12–28)
CALCIUM SERPL-MCNC: 9.4 MG/DL (ref 8.7–10.3)
CASTS URNS QL MICRO: NORMAL /LPF
CHLORIDE SERPL-SCNC: 101 MMOL/L (ref 96–106)
CHOLEST SERPL-MCNC: 177 MG/DL (ref 100–199)
CO2 SERPL-SCNC: 23 MMOL/L (ref 20–29)
COLOR UR: YELLOW
CREAT SERPL-MCNC: 0.87 MG/DL (ref 0.57–1)
EGFRCR SERPLBLD CKD-EPI 2021: 75 ML/MIN/1.73
EOSINOPHIL # BLD AUTO: 0.2 X10E3/UL (ref 0–0.4)
EOSINOPHIL NFR BLD AUTO: 5 %
EPI CELLS #/AREA URNS HPF: NORMAL /HPF (ref 0–10)
ERYTHROCYTE [DISTWIDTH] IN BLOOD BY AUTOMATED COUNT: 13 % (ref 11.7–15.4)
GLOBULIN SER CALC-MCNC: 2.7 G/DL (ref 1.5–4.5)
GLUCOSE SERPL-MCNC: 86 MG/DL (ref 70–99)
GLUCOSE UR QL STRIP: NEGATIVE
HBA1C MFR BLD: 6.8 % (ref 4.8–5.6)
HCT VFR BLD AUTO: 35.9 % (ref 34–46.6)
HDLC SERPL-MCNC: 67 MG/DL
HGB BLD-MCNC: 11.7 G/DL (ref 11.1–15.9)
HGB UR QL STRIP: NEGATIVE
IMM GRANULOCYTES # BLD AUTO: 0 X10E3/UL (ref 0–0.1)
IMM GRANULOCYTES NFR BLD AUTO: 0 %
KETONES UR QL STRIP: NEGATIVE
LDLC SERPL CALC-MCNC: 98 MG/DL (ref 0–99)
LEUKOCYTE ESTERASE UR QL STRIP: ABNORMAL
LYMPHOCYTES # BLD AUTO: 1.8 X10E3/UL (ref 0.7–3.1)
LYMPHOCYTES NFR BLD AUTO: 39 %
MCH RBC QN AUTO: 31.7 PG (ref 26.6–33)
MCHC RBC AUTO-ENTMCNC: 32.6 G/DL (ref 31.5–35.7)
MCV RBC AUTO: 97 FL (ref 79–97)
MICRO URNS: ABNORMAL
MONOCYTES # BLD AUTO: 0.2 X10E3/UL (ref 0.1–0.9)
MONOCYTES NFR BLD AUTO: 5 %
NEUTROPHILS # BLD AUTO: 2.2 X10E3/UL (ref 1.4–7)
NEUTROPHILS NFR BLD AUTO: 50 %
NITRITE UR QL STRIP: NEGATIVE
PH UR STRIP: 6 [PH] (ref 5–7.5)
PLATELET # BLD AUTO: 197 X10E3/UL (ref 150–450)
POTASSIUM SERPL-SCNC: 3.7 MMOL/L (ref 3.5–5.2)
PROT SERPL-MCNC: 6.9 G/DL (ref 6–8.5)
PROT UR QL STRIP: NEGATIVE
RBC # BLD AUTO: 3.69 X10E6/UL (ref 3.77–5.28)
RBC #/AREA URNS HPF: NORMAL /HPF (ref 0–2)
SODIUM SERPL-SCNC: 142 MMOL/L (ref 134–144)
SP GR UR STRIP: 1.01 (ref 1–1.03)
T4 FREE SERPL-MCNC: 0.98 NG/DL (ref 0.82–1.77)
TRIGL SERPL-MCNC: 60 MG/DL (ref 0–149)
TSH SERPL DL<=0.005 MIU/L-ACNC: 1.38 UIU/ML (ref 0.45–4.5)
UROBILINOGEN UR STRIP-MCNC: 0.2 MG/DL (ref 0.2–1)
VLDLC SERPL CALC-MCNC: 12 MG/DL (ref 5–40)
WBC # BLD AUTO: 4.5 X10E3/UL (ref 3.4–10.8)
WBC #/AREA URNS HPF: NORMAL /HPF (ref 0–5)

## 2024-12-10 DIAGNOSIS — E11.9 TYPE 2 DIABETES MELLITUS WITHOUT COMPLICATION, WITHOUT LONG-TERM CURRENT USE OF INSULIN (HCC): Primary | ICD-10-CM

## 2025-03-26 ENCOUNTER — COMMUNITY OUTREACH (OUTPATIENT)
Facility: CLINIC | Age: 65
End: 2025-03-26

## 2025-06-16 DIAGNOSIS — E11.9 TYPE 2 DIABETES MELLITUS WITHOUT COMPLICATION, WITHOUT LONG-TERM CURRENT USE OF INSULIN (HCC): ICD-10-CM

## (undated) DEVICE — GARMENT,MEDLINE,DVT,INT,CALF,MED, GEN2: Brand: MEDLINE

## (undated) DEVICE — DERMABOND SKIN ADH 0.7ML -- DERMABOND ADVANCED 12/BX

## (undated) DEVICE — TR BAND RADIAL ARTERY COMPRESSION DEVICE: Brand: TR BAND

## (undated) DEVICE — RADIFOCUS OPTITORQUE ANGIOGRAPHIC CATHETER: Brand: OPTITORQUE

## (undated) DEVICE — TROCAR SITE CLOSURE DEVICE: Brand: ENDO CLOSE

## (undated) DEVICE — INFECTION CONTROL KIT SYS

## (undated) DEVICE — 4-PORT MANIFOLD: Brand: NEPTUNE 2

## (undated) DEVICE — INSUFFLATION NEEDLE: Brand: SURGINEEDLE

## (undated) DEVICE — Device

## (undated) DEVICE — SOLUTION IV 1000ML 0.9% SOD CHL

## (undated) DEVICE — TUBING, SUCTION, 1/4" X 12', STRAIGHT: Brand: MEDLINE

## (undated) DEVICE — TROCAR: Brand: KII® SLEEVE

## (undated) DEVICE — WIREGUIDED CYTOLOGY BRUSH: Brand: RX CYTOLOGY BRUSH

## (undated) DEVICE — STRAP,POSITIONING,KNEE/BODY,FOAM,4X60": Brand: MEDLINE

## (undated) DEVICE — KIT MED IMAG CNTRST AGNT W/ IOPAMIDOL REUSE

## (undated) DEVICE — REM POLYHESIVE ADULT PATIENT RETURN ELECTRODE: Brand: VALLEYLAB

## (undated) DEVICE — GARMENT,MEDLINE,DVT,INT,CALF,LG, GEN2: Brand: MEDLINE

## (undated) DEVICE — PREP SKN CHLRAPRP APL 26ML STR --

## (undated) DEVICE — GLIDESHEATH SLENDER ACCESS KIT: Brand: GLIDESHEATH SLENDER

## (undated) DEVICE — SPHINCTEROTOME: Brand: DREAMTOME™ RX 44

## (undated) DEVICE — KIT MFLD ISOLATN NACL CNTRST PRT TBNG SPIK W/ PRSS TRNSDUC

## (undated) DEVICE — DEVICE LCK BILI RAP EXCHG OLPS --

## (undated) DEVICE — SPECIAL PROCEDURE DRAPE 32" X 34": Brand: SPECIAL PROCEDURE DRAPE

## (undated) DEVICE — APPLIER CLP M/L SHFT DIA5MM 15 LIG LIGAMAX 5

## (undated) DEVICE — SURGICAL PROCEDURE KIT GEN LAPAROSCOPY LF

## (undated) DEVICE — ANGIOGRAPHY KIT

## (undated) DEVICE — E-Z CLEAN, PTFE COATED, ELECTROSURGICAL LAPAROSCOPIC ELECTRODE, L-HOOK, 33 CM., SINGLE-USE, FOR USE WITH HAND CONTROL PENCIL: Brand: MEGADYNE

## (undated) DEVICE — LAPAROSCOPIC TROCAR SLEEVE/SINGLE USE: Brand: KII® OPTICAL ACCESS SYSTEM

## (undated) DEVICE — GUIDEWIRE VASC L260CM DIA0.035IN TIP L3MM STD EXCHG PTFE J

## (undated) DEVICE — SPLINT WR POS F/ARTERIAL ACC -- BX/10

## (undated) DEVICE — SUTURE MCRYL SZ 4-0 L27IN ABSRB UD L19MM PS-2 1/2 CIR PRIM Y426H

## (undated) DEVICE — ANGIOGRAPHIC CATHETER: Brand: IMPULSE™

## (undated) DEVICE — DRAPE,UTILTY,TAPE,15X26, 4EA/PK: Brand: MEDLINE

## (undated) DEVICE — PACK PROCEDURE SURG HRT CATH

## (undated) DEVICE — RETRIEVAL BALLOON CATHETER: Brand: EXTRACTOR™ PRO RX

## (undated) DEVICE — FILTER SMK EVAC FLO CLR MEGADYNE

## (undated) DEVICE — STERILE POLYISOPRENE POWDER-FREE SURGICAL GLOVES WITH EMOLLIENT COATING: Brand: PROTEXIS

## (undated) DEVICE — TISSUE RETRIEVAL SYSTEM: Brand: INZII RETRIEVAL SYSTEM

## (undated) DEVICE — TROCAR: Brand: KII® OPTICAL ACCESS SYSTEM

## (undated) DEVICE — CLICKLINE SCISSORS INSERT: Brand: CLICKLINE

## (undated) DEVICE — KIT HND CTRL 3 W STPCOCK ROT END 54IN PREM HI PRSS TBNG AT

## (undated) DEVICE — NEEDLE HYPO 22GA L1.5IN BLK S STL HUB POLYPR SHLD REG BVL